# Patient Record
Sex: MALE | Race: OTHER | HISPANIC OR LATINO | URBAN - METROPOLITAN AREA
[De-identification: names, ages, dates, MRNs, and addresses within clinical notes are randomized per-mention and may not be internally consistent; named-entity substitution may affect disease eponyms.]

---

## 2023-08-29 ENCOUNTER — INPATIENT (INPATIENT)
Facility: HOSPITAL | Age: 48
LOS: 1 days | Discharge: HOME | DRG: 639 | End: 2023-08-31
Attending: GENERAL ACUTE CARE HOSPITAL | Admitting: INTERNAL MEDICINE
Payer: COMMERCIAL

## 2023-08-29 VITALS
SYSTOLIC BLOOD PRESSURE: 109 MMHG | DIASTOLIC BLOOD PRESSURE: 63 MMHG | WEIGHT: 130.07 LBS | HEART RATE: 110 BPM | OXYGEN SATURATION: 100 % | TEMPERATURE: 98 F | RESPIRATION RATE: 16 BRPM

## 2023-08-29 DIAGNOSIS — A15.9 RESPIRATORY TUBERCULOSIS UNSPECIFIED: ICD-10-CM

## 2023-08-29 DIAGNOSIS — R73.9 HYPERGLYCEMIA, UNSPECIFIED: ICD-10-CM

## 2023-08-29 DIAGNOSIS — Z29.9 ENCOUNTER FOR PROPHYLACTIC MEASURES, UNSPECIFIED: ICD-10-CM

## 2023-08-29 DIAGNOSIS — D50.9 IRON DEFICIENCY ANEMIA, UNSPECIFIED: ICD-10-CM

## 2023-08-29 DIAGNOSIS — E11.9 TYPE 2 DIABETES MELLITUS WITHOUT COMPLICATIONS: ICD-10-CM

## 2023-08-29 LAB
ALBUMIN SERPL ELPH-MCNC: 3.8 G/DL — SIGNIFICANT CHANGE UP (ref 3.3–5)
ALP SERPL-CCNC: 153 U/L — HIGH (ref 40–120)
ALT FLD-CCNC: 13 U/L — SIGNIFICANT CHANGE UP (ref 10–45)
ANION GAP SERPL CALC-SCNC: 11 MMOL/L — SIGNIFICANT CHANGE UP (ref 5–17)
ANISOCYTOSIS BLD QL: SIGNIFICANT CHANGE UP
APPEARANCE UR: CLEAR — SIGNIFICANT CHANGE UP
APTT BLD: 36.4 SEC — HIGH (ref 24.5–35.6)
AST SERPL-CCNC: 20 U/L — SIGNIFICANT CHANGE UP (ref 10–40)
B-OH-BUTYR SERPL-SCNC: 0.5 MMOL/L — HIGH
BASE EXCESS BLDV CALC-SCNC: -4.1 MMOL/L — LOW (ref -2–3)
BASOPHILS # BLD AUTO: 0.12 K/UL — SIGNIFICANT CHANGE UP (ref 0–0.2)
BASOPHILS NFR BLD AUTO: 2.8 % — HIGH (ref 0–2)
BILIRUB SERPL-MCNC: 0.6 MG/DL — SIGNIFICANT CHANGE UP (ref 0.2–1.2)
BILIRUB UR-MCNC: NEGATIVE — SIGNIFICANT CHANGE UP
BLD GP AB SCN SERPL QL: NEGATIVE — SIGNIFICANT CHANGE UP
BUN SERPL-MCNC: 14 MG/DL — SIGNIFICANT CHANGE UP (ref 7–23)
CA-I SERPL-SCNC: 1.3 MMOL/L — SIGNIFICANT CHANGE UP (ref 1.15–1.33)
CALCIUM SERPL-MCNC: 9.9 MG/DL — SIGNIFICANT CHANGE UP (ref 8.4–10.5)
CHLORIDE SERPL-SCNC: 100 MMOL/L — SIGNIFICANT CHANGE UP (ref 96–108)
CO2 BLDV-SCNC: 22.9 MMOL/L — SIGNIFICANT CHANGE UP (ref 22–26)
CO2 SERPL-SCNC: 21 MMOL/L — LOW (ref 22–31)
COLOR SPEC: YELLOW — SIGNIFICANT CHANGE UP
CREAT SERPL-MCNC: 0.61 MG/DL — SIGNIFICANT CHANGE UP (ref 0.5–1.3)
DIFF PNL FLD: NEGATIVE — SIGNIFICANT CHANGE UP
EGFR: 119 ML/MIN/1.73M2 — SIGNIFICANT CHANGE UP
EOSINOPHIL # BLD AUTO: 0.17 K/UL — SIGNIFICANT CHANGE UP (ref 0–0.5)
EOSINOPHIL NFR BLD AUTO: 3.8 % — SIGNIFICANT CHANGE UP (ref 0–6)
GAS PNL BLDV: 133 MMOL/L — LOW (ref 136–145)
GAS PNL BLDV: SIGNIFICANT CHANGE UP
GIANT PLATELETS BLD QL SMEAR: PRESENT — SIGNIFICANT CHANGE UP
GLUCOSE BLDC GLUCOMTR-MCNC: 313 MG/DL — HIGH (ref 70–99)
GLUCOSE BLDC GLUCOMTR-MCNC: 369 MG/DL — HIGH (ref 70–99)
GLUCOSE BLDC GLUCOMTR-MCNC: 484 MG/DL — CRITICAL HIGH (ref 70–99)
GLUCOSE SERPL-MCNC: 447 MG/DL — HIGH (ref 70–99)
GLUCOSE UR QL: >=1000
HCO3 BLDV-SCNC: 22 MMOL/L — SIGNIFICANT CHANGE UP (ref 22–29)
HCT VFR BLD CALC: 22.4 % — LOW (ref 39–50)
HCT VFR BLD CALC: 24.7 % — LOW (ref 39–50)
HGB BLD-MCNC: 6.5 G/DL — CRITICAL LOW (ref 13–17)
HGB BLD-MCNC: 7.2 G/DL — LOW (ref 13–17)
HIV 1+2 AB+HIV1 P24 AG SERPL QL IA: SIGNIFICANT CHANGE UP
HYPOCHROMIA BLD QL: SIGNIFICANT CHANGE UP
INR BLD: 1.13 — SIGNIFICANT CHANGE UP (ref 0.85–1.18)
KETONES UR-MCNC: NEGATIVE — SIGNIFICANT CHANGE UP
LACTATE SERPL-SCNC: 1.5 MMOL/L — SIGNIFICANT CHANGE UP (ref 0.5–2)
LEUKOCYTE ESTERASE UR-ACNC: NEGATIVE — SIGNIFICANT CHANGE UP
LIDOCAIN IGE QN: 144 U/L — HIGH (ref 7–60)
LYMPHOCYTES # BLD AUTO: 1.32 K/UL — SIGNIFICANT CHANGE UP (ref 1–3.3)
LYMPHOCYTES # BLD AUTO: 30.2 % — SIGNIFICANT CHANGE UP (ref 13–44)
MACROCYTES BLD QL: SLIGHT — SIGNIFICANT CHANGE UP
MAGNESIUM SERPL-MCNC: 2 MG/DL — SIGNIFICANT CHANGE UP (ref 1.6–2.6)
MANUAL SMEAR VERIFICATION: SIGNIFICANT CHANGE UP
MCHC RBC-ENTMCNC: 20.2 PG — LOW (ref 27–34)
MCHC RBC-ENTMCNC: 20.3 PG — LOW (ref 27–34)
MCHC RBC-ENTMCNC: 29 GM/DL — LOW (ref 32–36)
MCHC RBC-ENTMCNC: 29.1 GM/DL — LOW (ref 32–36)
MCV RBC AUTO: 69.2 FL — LOW (ref 80–100)
MCV RBC AUTO: 70 FL — LOW (ref 80–100)
METAMYELOCYTES # FLD: 0.9 % — HIGH (ref 0–0)
MICROCYTES BLD QL: SIGNIFICANT CHANGE UP
MONOCYTES # BLD AUTO: 0.25 K/UL — SIGNIFICANT CHANGE UP (ref 0–0.9)
MONOCYTES NFR BLD AUTO: 5.7 % — SIGNIFICANT CHANGE UP (ref 2–14)
NEUTROPHILS # BLD AUTO: 2.47 K/UL — SIGNIFICANT CHANGE UP (ref 1.8–7.4)
NEUTROPHILS NFR BLD AUTO: 56.6 % — SIGNIFICANT CHANGE UP (ref 43–77)
NITRITE UR-MCNC: NEGATIVE — SIGNIFICANT CHANGE UP
NRBC # BLD: 0 /100 WBCS — SIGNIFICANT CHANGE UP (ref 0–0)
OVALOCYTES BLD QL SMEAR: SLIGHT — SIGNIFICANT CHANGE UP
PCO2 BLDV: 41 MMHG — LOW (ref 42–55)
PH BLDV: 7.33 — SIGNIFICANT CHANGE UP (ref 7.32–7.43)
PH UR: 5.5 — SIGNIFICANT CHANGE UP (ref 5–8)
PLAT MORPH BLD: ABNORMAL
PLATELET # BLD AUTO: 296 K/UL — SIGNIFICANT CHANGE UP (ref 150–400)
PLATELET # BLD AUTO: 346 K/UL — SIGNIFICANT CHANGE UP (ref 150–400)
PO2 BLDV: <33 MMHG — SIGNIFICANT CHANGE UP (ref 25–45)
POIKILOCYTOSIS BLD QL AUTO: SLIGHT — SIGNIFICANT CHANGE UP
POLYCHROMASIA BLD QL SMEAR: SLIGHT — SIGNIFICANT CHANGE UP
POTASSIUM BLDV-SCNC: 4.7 MMOL/L — SIGNIFICANT CHANGE UP (ref 3.5–5.1)
POTASSIUM SERPL-MCNC: 4.4 MMOL/L — SIGNIFICANT CHANGE UP (ref 3.5–5.3)
POTASSIUM SERPL-SCNC: 4.4 MMOL/L — SIGNIFICANT CHANGE UP (ref 3.5–5.3)
PROT SERPL-MCNC: 8.6 G/DL — HIGH (ref 6–8.3)
PROT UR-MCNC: NEGATIVE MG/DL — SIGNIFICANT CHANGE UP
PROTHROM AB SERPL-ACNC: 12.8 SEC — SIGNIFICANT CHANGE UP (ref 9.5–13)
RAPID RVP RESULT: SIGNIFICANT CHANGE UP
RBC # BLD: 3.2 M/UL — LOW (ref 4.2–5.8)
RBC # BLD: 3.57 M/UL — LOW (ref 4.2–5.8)
RBC # FLD: 15.9 % — HIGH (ref 10.3–14.5)
RBC # FLD: 15.9 % — HIGH (ref 10.3–14.5)
RBC BLD AUTO: ABNORMAL
RH IG SCN BLD-IMP: POSITIVE — SIGNIFICANT CHANGE UP
SAO2 % BLDV: 50.8 % — LOW (ref 67–88)
SARS-COV-2 RNA SPEC QL NAA+PROBE: SIGNIFICANT CHANGE UP
SMUDGE CELLS # BLD: PRESENT — SIGNIFICANT CHANGE UP
SODIUM SERPL-SCNC: 132 MMOL/L — LOW (ref 135–145)
SP GR SPEC: 1.01 — SIGNIFICANT CHANGE UP (ref 1–1.03)
UROBILINOGEN FLD QL: 0.2 E.U./DL — SIGNIFICANT CHANGE UP
WBC # BLD: 3.79 K/UL — LOW (ref 3.8–10.5)
WBC # BLD: 4.36 K/UL — SIGNIFICANT CHANGE UP (ref 3.8–10.5)
WBC # FLD AUTO: 3.79 K/UL — LOW (ref 3.8–10.5)
WBC # FLD AUTO: 4.36 K/UL — SIGNIFICANT CHANGE UP (ref 3.8–10.5)

## 2023-08-29 PROCEDURE — 99222 1ST HOSP IP/OBS MODERATE 55: CPT | Mod: GC

## 2023-08-29 PROCEDURE — 71045 X-RAY EXAM CHEST 1 VIEW: CPT | Mod: 26

## 2023-08-29 PROCEDURE — 99223 1ST HOSP IP/OBS HIGH 75: CPT | Mod: GC

## 2023-08-29 PROCEDURE — 99285 EMERGENCY DEPT VISIT HI MDM: CPT

## 2023-08-29 RX ORDER — LANOLIN ALCOHOL/MO/W.PET/CERES
5 CREAM (GRAM) TOPICAL AT BEDTIME
Refills: 0 | Status: DISCONTINUED | OUTPATIENT
Start: 2023-08-29 | End: 2023-08-31

## 2023-08-29 RX ORDER — DEXTROSE 50 % IN WATER 50 %
15 SYRINGE (ML) INTRAVENOUS ONCE
Refills: 0 | Status: DISCONTINUED | OUTPATIENT
Start: 2023-08-29 | End: 2023-08-31

## 2023-08-29 RX ORDER — GLUCAGON INJECTION, SOLUTION 0.5 MG/.1ML
1 INJECTION, SOLUTION SUBCUTANEOUS ONCE
Refills: 0 | Status: DISCONTINUED | OUTPATIENT
Start: 2023-08-29 | End: 2023-08-31

## 2023-08-29 RX ORDER — HEXAVITAMINS
300 TABLET ORAL EVERY 24 HOURS
Refills: 0 | Status: DISCONTINUED | OUTPATIENT
Start: 2023-08-29 | End: 2023-08-31

## 2023-08-29 RX ORDER — INSULIN LISPRO 100/ML
VIAL (ML) SUBCUTANEOUS AT BEDTIME
Refills: 0 | Status: DISCONTINUED | OUTPATIENT
Start: 2023-08-29 | End: 2023-08-30

## 2023-08-29 RX ORDER — SODIUM CHLORIDE 9 MG/ML
1000 INJECTION, SOLUTION INTRAVENOUS
Refills: 0 | Status: DISCONTINUED | OUTPATIENT
Start: 2023-08-29 | End: 2023-08-31

## 2023-08-29 RX ORDER — DEXTROSE 50 % IN WATER 50 %
25 SYRINGE (ML) INTRAVENOUS ONCE
Refills: 0 | Status: DISCONTINUED | OUTPATIENT
Start: 2023-08-29 | End: 2023-08-31

## 2023-08-29 RX ORDER — PYRAZINAMIDE 500 MG/1
1500 TABLET ORAL EVERY 24 HOURS
Refills: 0 | Status: DISCONTINUED | OUTPATIENT
Start: 2023-08-29 | End: 2023-08-31

## 2023-08-29 RX ORDER — DEXTROSE 50 % IN WATER 50 %
12.5 SYRINGE (ML) INTRAVENOUS ONCE
Refills: 0 | Status: DISCONTINUED | OUTPATIENT
Start: 2023-08-29 | End: 2023-08-31

## 2023-08-29 RX ORDER — PYRIDOXINE HCL (VITAMIN B6) 100 MG
25 TABLET ORAL EVERY 24 HOURS
Refills: 0 | Status: DISCONTINUED | OUTPATIENT
Start: 2023-08-29 | End: 2023-08-30

## 2023-08-29 RX ORDER — INSULIN HUMAN 100 [IU]/ML
6 INJECTION, SOLUTION SUBCUTANEOUS ONCE
Refills: 0 | Status: COMPLETED | OUTPATIENT
Start: 2023-08-29 | End: 2023-08-29

## 2023-08-29 RX ORDER — INSULIN LISPRO 100/ML
VIAL (ML) SUBCUTANEOUS
Refills: 0 | Status: DISCONTINUED | OUTPATIENT
Start: 2023-08-29 | End: 2023-08-30

## 2023-08-29 RX ORDER — ISOPROPYL ALCOHOL, BENZOCAINE .7; .06 ML/ML; ML/ML
0 SWAB TOPICAL
Qty: 100 | Refills: 1
Start: 2023-08-29

## 2023-08-29 RX ORDER — INSULIN LISPRO 100/ML
5 VIAL (ML) SUBCUTANEOUS
Refills: 0 | Status: DISCONTINUED | OUTPATIENT
Start: 2023-08-29 | End: 2023-08-30

## 2023-08-29 RX ORDER — INSULIN LISPRO 100/ML
VIAL (ML) SUBCUTANEOUS
Refills: 0 | Status: DISCONTINUED | OUTPATIENT
Start: 2023-08-29 | End: 2023-08-29

## 2023-08-29 RX ORDER — ACETAMINOPHEN 500 MG
1000 TABLET ORAL EVERY 6 HOURS
Refills: 0 | Status: DISCONTINUED | OUTPATIENT
Start: 2023-08-29 | End: 2023-08-31

## 2023-08-29 RX ORDER — INSULIN GLARGINE 100 [IU]/ML
15 INJECTION, SOLUTION SUBCUTANEOUS AT BEDTIME
Refills: 0 | Status: DISCONTINUED | OUTPATIENT
Start: 2023-08-29 | End: 2023-08-31

## 2023-08-29 RX ORDER — SODIUM CHLORIDE 9 MG/ML
1000 INJECTION INTRAMUSCULAR; INTRAVENOUS; SUBCUTANEOUS ONCE
Refills: 0 | Status: COMPLETED | OUTPATIENT
Start: 2023-08-29 | End: 2023-08-29

## 2023-08-29 RX ORDER — ONDANSETRON 8 MG/1
4 TABLET, FILM COATED ORAL EVERY 8 HOURS
Refills: 0 | Status: DISCONTINUED | OUTPATIENT
Start: 2023-08-29 | End: 2023-08-31

## 2023-08-29 RX ADMIN — PYRAZINAMIDE 1500 MILLIGRAM(S): 500 TABLET ORAL at 18:01

## 2023-08-29 RX ADMIN — SODIUM CHLORIDE 1000 MILLILITER(S): 9 INJECTION INTRAMUSCULAR; INTRAVENOUS; SUBCUTANEOUS at 13:35

## 2023-08-29 RX ADMIN — Medication 5 UNIT(S): at 17:58

## 2023-08-29 RX ADMIN — Medication 25 MILLIGRAM(S): at 18:01

## 2023-08-29 RX ADMIN — SODIUM CHLORIDE 1000 MILLILITER(S): 9 INJECTION INTRAMUSCULAR; INTRAVENOUS; SUBCUTANEOUS at 12:12

## 2023-08-29 RX ADMIN — INSULIN HUMAN 6 UNIT(S): 100 INJECTION, SOLUTION SUBCUTANEOUS at 13:37

## 2023-08-29 RX ADMIN — Medication 300 MILLIGRAM(S): at 18:00

## 2023-08-29 RX ADMIN — Medication 4: at 17:59

## 2023-08-29 RX ADMIN — INSULIN GLARGINE 15 UNIT(S): 100 INJECTION, SOLUTION SUBCUTANEOUS at 22:47

## 2023-08-29 NOTE — DISCHARGE NOTE PROVIDER - NSDCFUADDAPPT_GEN_ALL_CORE_FT
Please ensure you have a follow-up with a PCP. We have made an appointment for you with Dr. Micky Simon on 9/11 at [facility]. It is essential you get both a GI (gastroenterology) appointment to discuss your anemia and family history of colon cancer. You should also have make an appointment with an endocrinologist, otherwise your PCP can help manage your diabetes.  Please ensure you have a follow-up with a PCP. We have made an appointment for you with Dr. Micky Simon on 9/11 11:45 am at [facility]. It is essential you get both a GI (gastroenterology) appointment to discuss your anemia and family history of colon cancer. You should also have make an appointment with an endocrinologist, otherwise your PCP can help manage your diabetes.  Please ensure you have a follow-up with a PCP. We have made an appointment for you with Dr. Micky Simon on 9/11 11:45 am at [facility]. It is essential you get both a GI (gastroenterology) appointment to discuss your anemia and family history of colon cancer. You should also have make an appointment with an endocrinologist, otherwise your PCP can help manage your diabetes.     It is also important you continue to follow-up with the [facility] [place] for continued management of your tuberculosis.  Address: [address] [place] [address]  Phone: [phone] Please ensure you have a follow-up with a PCP. We have made an appointment for you with Dr. Micky Simon on 9/11 11:45 am at [facility]. It is essential you get both a GI (gastroenterology) appointment to discuss your anemia and family history of colon cancer. You should also have make an appointment with an endocrinologist, otherwise your PCP can help manage your diabetes.     It is also important you continue to follow-up with the [facility] [place] for continued management of your tuberculosis.  Address: [address] [place] [address]  Phone: [phone]  You have an appointment on 9/5/23 at 10:30 am.

## 2023-08-29 NOTE — CONSULT NOTE ADULT - ATTENDING COMMENTS
Pt seen on rounds this afternoon.  48-yo man with a history of EtOH abuse (says he stopped 2 months ago), Pulmonary TB (diagnosed 2 months ago and on triple-drug therapy) and type 2 DM (diagnosed 1 week ago).  Is now admitted for uncontrolled hyperglycemia.  Had been given a prescription to start metformin for the DM, but did not start it.    glucose 447 on presentation with A1c 11.3%.  Non-ketotic. Glucose has decreased into the 300 range with a single correction dose of regular insulin.  Diet reviewed--no obvious intake of excess fruit, juice, concentrated sweets, etc--but possibly excessive portions of rice at his main meals.  --Will start on Lantus 15 units hs/premeal lispro 5 units  --Will need diabetic teaching  --given his lack of obesity and negative family history for DM need to consider the possibility that he is an incipient Type 1

## 2023-08-29 NOTE — ED ADULT NURSE NOTE - OBJECTIVE STATEMENT
Patient w/ Hx of TB, Dx and treatment started June, c/o of cough w/ phlegm, no hematemesis, mild SOB and w/ fatigue. As per Dr. Cummings/ [facility], patient no longer infectious, recent sputum culture negative,  last July 13 + sputum culture , negative since.   Patient also recently diagnosed w/ diabetes, c/o of frequent urination and thirst, not yet started on medications.  FSBG in .  No chest pain, no lighheadednss /dizziness.   Patient Portuguese speaking only; translation phone uses.  Isolation droplet observed.  pMHx  ETOH, TB, Diabetes

## 2023-08-29 NOTE — CONSULT NOTE ADULT - SUBJECTIVE AND OBJECTIVE BOX
HISTORY OF PRESENT ILLNESS  RONDA EDMONDSON is a 48y Male with a past medical history of etoh use disorder, recent diagnosis of pulm TB (on INH/Rifamin/PZA started on 6/29) was sent to hospital     On admission, hgb 7.2, glucose 447, Na 132, bicarb 21, Cr 0.61, beta-hydroxybutyrase 0.5, a1c 11.3, lipase 144    Endocrinology has been consulted for Diabetes Management.    DIABETES HISTORY  - Age at diagnosis:   - Symptoms at time of diagnosis:   - Current Therapy:  - History of other regimens:   - History of hypoglycemia:   - History of DKA/HHS:   - Diabetic Complications:   - Home FSG:        > Fasting: *** mg/dL.        > Before meals: *** mg/dL.        > Bedtime: *** mg/dL.  - Diet:          > Breakfast:         > Lunch:        > Dinner:        > Snacks:  - Physical activity:    - Diabetes managed outpatient by:    FAMILY HISTORY  - Diabetes:  - Thyroid:  - Autoimmune:  - Other:    SOCIAL HISTORY  - Work:  - Alcohol:  - Smoking:  - Recreational Drugs:    ALLERGIES  No Known Allergies      CURRENT MEDICATIONS  acetaminophen     Tablet .. 1000 milliGRAM(s) Oral every 6 hours PRN  aluminum hydroxide/magnesium hydroxide/simethicone Suspension 30 milliLiter(s) Oral every 4 hours PRN  dextrose 5%. 1000 milliLiter(s) IV Continuous <Continuous>  dextrose 5%. 1000 milliLiter(s) IV Continuous <Continuous>  dextrose 50% Injectable 25 Gram(s) IV Push once  dextrose 50% Injectable 12.5 Gram(s) IV Push once  dextrose 50% Injectable 25 Gram(s) IV Push once  dextrose Oral Gel 15 Gram(s) Oral once PRN  glucagon  Injectable 1 milliGRAM(s) IntraMuscular once  insulin lispro (ADMELOG) corrective regimen sliding scale   SubCutaneous three times a day before meals  isoniazid 300 milliGRAM(s) Oral every 24 hours  melatonin 5 milliGRAM(s) Oral at bedtime PRN  ondansetron Injectable 4 milliGRAM(s) IV Push every 8 hours PRN  pyrazinamide 1500 milliGRAM(s) Oral every 24 hours  pyridoxine 25 milliGRAM(s) Oral every 24 hours  rifAMPin 600 milliGRAM(s) Oral every 24 hours      REVIEW OF SYSTEMS  Constitutional:  Negative fever, chills or loss of appetite.  Eyes:  Negative blurry vision or double vision.  Cardiovascular:  Negative for chest pain or palpitations.  Respiratory:  Negative for cough, wheezing, or shortness of breath.   Gastrointestinal:  Negative for nausea, vomiting, diarrhea, constipation, or abdominal pain.  Genitourinary:  Negative frequency, urgency or dysuria.  Neurologic:  No headache, confusion, dizziness, lightheadedness.    PHYSICAL EXAM  Vital Signs Last 24 Hrs  T(C): 37.1 (29 Aug 2023 12:18), Max: 37.1 (29 Aug 2023 12:18)  T(F): 98.7 (29 Aug 2023 12:18), Max: 98.7 (29 Aug 2023 12:18)  HR: 106 (29 Aug 2023 13:46) (106 - 111)  BP: 115/70 (29 Aug 2023 13:46) (109/63 - 124/64)  BP(mean): --  RR: 18 (29 Aug 2023 13:46) (16 - 18)  SpO2: 98% (29 Aug 2023 13:46) (98% - 100%)    Parameters below as of 29 Aug 2023 13:46  Patient On (Oxygen Delivery Method): room air    Constitutional: Awake, alert, in no acute distress.   HEENT: Normocephalic, atraumatic, CHIKIS, no proptosis or lid retraction.   Neck: supple, no acanthosis, no thyromegaly or palpable thyroid nodules.  Respiratory: Lungs clear to ausculation bilaterally.   Cardiovascular: regular rhythm, normal S1 and S2, no audible murmurs.   GI: soft, non-tender, non-distended, bowel sounds present, no masses appreciated.  Extremities: No lower extremity edema, peripheral pulses present.   Skin: no rashes.   Psychiatric: AAO x 3. Normal affect/mood.     LABS  CBC - WBC/HGB/HTC/PLT: 4.36/7.2/24.7/346 (08-29-23)  BMP: Na/K/Cl/Bicarb/BUN/Cr/Gluc: 132/4.4/100/21/14/0.61/447 (08-29-23)  Anion Gap: 11 (08-29-23)  eGFR: 119 (08-29-23)  Calcium: 9.9 (08-29-23)  Phosphorus: -- (08-29-23)  Magnesium: 2.0 (08-29-23)  LFT - Alb/Tprot/Tbili/Dbili/AlkPhos/ALT/AST: 3.8/--/0.6/--/153/13/20 (08-29-23)  PT/aPTT/INR: 12.8/36.4/1.13 (08-29-23)    CBC - WBC/HGB/HTC/PLT: 4.36/7.2/24.7/346 (08-29-23)BMP: Na/K/Cl/Bicarb/BUN/Cr/Gluc: 132/4.4/100/21/14/0.61/447 (08-29-23)  Anion Gap: 11 (08-29-23)  eGFR: 119 (08-29-23)  Calcium: 9.9 (08-29-23)  Phosphorus: -- (08-29-23)  Magnesium: 2.0 (08-29-23)    CAPILLARY BLOOD GLUCOSE & INSULIN RECEIVED  426 mg/dL (08-29 @ 11:14)  446 mg/dL (08-29 @ 11:35)  372 mg/dL (08-29 @ 13:32)  301 mg/dL (08-29 @ 14:19)          ASSESSMENT / RECOMMENDATIONS    A1C: 11.3 %  BUN: 14  Creatinine: 0.61  GFR: 119  Weight: 59  BMI:   EF:     # Type 2 diabetes mellitus with hyperglycemia  - Please continue lantus *** units at bedtime.   - Continue lispro *** units before each meal.  - Continue lispro *** dose sliding scale before meals and at bedtime.  - Patient's fingerstick glucose goal is 100-180 mg/dL.    - Discharge recommendations to be discussed.   - Patient can follow up at discharge with [facility] by calling [phone] to make an appointment.      Case discussed with Dr. Sun. Primary team updated.       Jordana Bell  Endocrinology Fellow    Service Pager: [phone]  HISTORY OF PRESENT ILLNESS  RONDA EDMONDSON is a 48y Male with a past medical history of etoh use disorder, recent diagnosis of pulm TB (on INH/Rifamin/PZA started on 6/29) was sent to hospital by his PMD for glucose > 600.  Pt endorses polydipsia and polyuria and recently diagnosed with DM outpatient less than one week ago.  Pt was supposedly prescribed metformin however pt has not started taking.  Pt has also endorses leg swelling and abdomin pain that has no association to food.  Denies headaches, vomiting, nausea, changes in urination and bowelmovement.    On admission, hgb 7.2, glucose 447, Na 132, bicarb 21, Cr 0.61, beta-hydroxybutyrase 0.5, a1c 11.3, lipase 144.  Endocrinology has been consulted for Diabetes Management.     [id number] was used partially  DIABETES HISTORY  - Age at diagnosis: less than one week ago  - Symptoms at time of diagnosis: polydipsia, polyuria  - Current Therapy: metformin however pt has not started taking  - Diet:          > Breakfast:  coffee and banana        > Lunch: chicken and rice        > Dinner: chicken and rce  - Diabetes managed outpatient by: PCP    FAMILY HISTORY  - Diabetes: denies  - Thyroid: denies    SOCIAL HISTORY  - Alcohol:  - Smoking: denies  - Recreational Drugs: denies    ALLERGIES  No Known Allergies      CURRENT MEDICATIONS  acetaminophen     Tablet .. 1000 milliGRAM(s) Oral every 6 hours PRN  aluminum hydroxide/magnesium hydroxide/simethicone Suspension 30 milliLiter(s) Oral every 4 hours PRN  dextrose 5%. 1000 milliLiter(s) IV Continuous <Continuous>  dextrose 5%. 1000 milliLiter(s) IV Continuous <Continuous>  dextrose 50% Injectable 25 Gram(s) IV Push once  dextrose 50% Injectable 12.5 Gram(s) IV Push once  dextrose 50% Injectable 25 Gram(s) IV Push once  dextrose Oral Gel 15 Gram(s) Oral once PRN  glucagon  Injectable 1 milliGRAM(s) IntraMuscular once  insulin lispro (ADMELOG) corrective regimen sliding scale   SubCutaneous three times a day before meals  isoniazid 300 milliGRAM(s) Oral every 24 hours  melatonin 5 milliGRAM(s) Oral at bedtime PRN  ondansetron Injectable 4 milliGRAM(s) IV Push every 8 hours PRN  pyrazinamide 1500 milliGRAM(s) Oral every 24 hours  pyridoxine 25 milliGRAM(s) Oral every 24 hours  rifAMPin 600 milliGRAM(s) Oral every 24 hours      REVIEW OF SYSTEMS  Constitutional:  Negative fever, chills or loss of appetite.  Eyes:  Negative blurry vision or double vision.  Cardiovascular:  Negative for chest pain or palpitations.  Respiratory:  Negative for cough, wheezing, or shortness of breath.   Gastrointestinal:  abdominal pain  Genitourinary:  Negative frequency, urgency or dysuria.  Neurologic:  No headache, confusion, dizziness, lightheadedness.    PHYSICAL EXAM  Vital Signs Last 24 Hrs  T(C): 37.1 (29 Aug 2023 12:18), Max: 37.1 (29 Aug 2023 12:18)  T(F): 98.7 (29 Aug 2023 12:18), Max: 98.7 (29 Aug 2023 12:18)  HR: 106 (29 Aug 2023 13:46) (106 - 111)  BP: 115/70 (29 Aug 2023 13:46) (109/63 - 124/64)  BP(mean): --  RR: 18 (29 Aug 2023 13:46) (16 - 18)  SpO2: 98% (29 Aug 2023 13:46) (98% - 100%)    Parameters below as of 29 Aug 2023 13:46  Patient On (Oxygen Delivery Method): room air    Constitutional: Awake, alert, in no acute distress.   HEENT: Normocephalic, atraumatic, CHIKIS, no proptosis or lid retraction.   Neck: supple, no acanthosis, no thyromegaly or palpable thyroid nodules.  Respiratory: Lungs clear to ausculation bilaterally.   Cardiovascular: regular rhythm, normal S1 and S2, no audible murmurs.   GI: soft, non-tender, non-distended, bowel sounds present, no masses appreciated.  Extremities: No lower extremity edema, peripheral pulses present.   Skin: no rashes.   Psychiatric: AAO x 3. Normal affect/mood.     LABS  CBC - WBC/HGB/HTC/PLT: 4.36/7.2/24.7/346 (08-29-23)  BMP: Na/K/Cl/Bicarb/BUN/Cr/Gluc: 132/4.4/100/21/14/0.61/447 (08-29-23)  Anion Gap: 11 (08-29-23)  eGFR: 119 (08-29-23)  Calcium: 9.9 (08-29-23)  Phosphorus: -- (08-29-23)  Magnesium: 2.0 (08-29-23)  LFT - Alb/Tprot/Tbili/Dbili/AlkPhos/ALT/AST: 3.8/--/0.6/--/153/13/20 (08-29-23)  PT/aPTT/INR: 12.8/36.4/1.13 (08-29-23)    CBC - WBC/HGB/HTC/PLT: 4.36/7.2/24.7/346 (08-29-23)BMP: Na/K/Cl/Bicarb/BUN/Cr/Gluc: 132/4.4/100/21/14/0.61/447 (08-29-23)  Anion Gap: 11 (08-29-23)  eGFR: 119 (08-29-23)  Calcium: 9.9 (08-29-23)  Phosphorus: -- (08-29-23)  Magnesium: 2.0 (08-29-23)    CAPILLARY BLOOD GLUCOSE & INSULIN RECEIVED  426 mg/dL (08-29 @ 11:14)  446 mg/dL (08-29 @ 11:35)  372 mg/dL (08-29 @ 13:32)  301 mg/dL (08-29 @ 14:19)          ASSESSMENT / RECOMMENDATIONS    A1C: 11.3 %  BUN: 14  Creatinine: 0.61  GFR: 119  Weight: 59  BMI:   EF:     # Type 2 diabetes mellitus with hyperglycemia  - Please continue lantus *** units at bedtime.   - Continue lispro *** units before each meal.  - Continue lispro *** dose sliding scale before meals and at bedtime.  - Patient's fingerstick glucose goal is 100-180 mg/dL.    - Discharge recommendations to be discussed.   - Patient can follow up at discharge with [facility] by calling [phone] to make an appointment.      Case discussed with Dr. Sun. Primary team updated.       Jordana Bell  Endocrinology Fellow    Service Pager: [phone]  HISTORY OF PRESENT ILLNESS  RONDA EDMONDSON is a 48y Male with a past medical history of etoh use disorder, recent diagnosis of pulm TB (on INH/Rifamin/PZA started on 6/29) was sent to hospital by his PMD for glucose > 600.  Pt endorses polydipsia and polyuria and recently diagnosed with DM outpatient less than one week ago.  Pt was supposedly prescribed metformin however pt has not started taking.  Pt has also endorses leg swelling and abdomin pain that has no association to food.  Denies headaches, vomiting, nausea, changes in urination and bowel movement.    On admission, hgb 7.2, glucose 447, Na 132, bicarb 21, Cr 0.61, beta-hydroxybutyrase 0.5, a1c 11.3, lipase 144.  Endocrinology has been consulted for Diabetes Management.     [id number] was used partially  DIABETES HISTORY  - Age at diagnosis: less than one week ago  - Symptoms at time of diagnosis: polydipsia, polyuria  - Current Therapy: metformin however pt has not started taking  - Diet:          > Breakfast:  coffee and banana        > Lunch: chicken and rice        > Dinner: chicken and rce  - Diabetes managed outpatient by: PCP    FAMILY HISTORY  - Diabetes: denies  - Thyroid: denies    SOCIAL HISTORY  - Alcohol: 7 beers daily, 2 months ago  - Smoking: denies  - Recreational Drugs: denies    ALLERGIES  No Known Allergies      CURRENT MEDICATIONS  acetaminophen     Tablet .. 1000 milliGRAM(s) Oral every 6 hours PRN  aluminum hydroxide/magnesium hydroxide/simethicone Suspension 30 milliLiter(s) Oral every 4 hours PRN  dextrose 5%. 1000 milliLiter(s) IV Continuous <Continuous>  dextrose 5%. 1000 milliLiter(s) IV Continuous <Continuous>  dextrose 50% Injectable 25 Gram(s) IV Push once  dextrose 50% Injectable 12.5 Gram(s) IV Push once  dextrose 50% Injectable 25 Gram(s) IV Push once  dextrose Oral Gel 15 Gram(s) Oral once PRN  glucagon  Injectable 1 milliGRAM(s) IntraMuscular once  insulin lispro (ADMELOG) corrective regimen sliding scale   SubCutaneous three times a day before meals  isoniazid 300 milliGRAM(s) Oral every 24 hours  melatonin 5 milliGRAM(s) Oral at bedtime PRN  ondansetron Injectable 4 milliGRAM(s) IV Push every 8 hours PRN  pyrazinamide 1500 milliGRAM(s) Oral every 24 hours  pyridoxine 25 milliGRAM(s) Oral every 24 hours  rifAMPin 600 milliGRAM(s) Oral every 24 hours      REVIEW OF SYSTEMS  Constitutional:  Negative fever, chills or loss of appetite.  Eyes:  Negative blurry vision or double vision.  Cardiovascular:  Negative for chest pain or palpitations.  Respiratory:  Negative for cough, wheezing, or shortness of breath.   Gastrointestinal:  abdominal pain  Genitourinary:  Negative frequency, urgency or dysuria.  Neurologic:  No headache, confusion, dizziness, lightheadedness.    PHYSICAL EXAM  Vital Signs Last 24 Hrs  T(C): 37.1 (29 Aug 2023 12:18), Max: 37.1 (29 Aug 2023 12:18)  T(F): 98.7 (29 Aug 2023 12:18), Max: 98.7 (29 Aug 2023 12:18)  HR: 106 (29 Aug 2023 13:46) (106 - 111)  BP: 115/70 (29 Aug 2023 13:46) (109/63 - 124/64)  BP(mean): --  RR: 18 (29 Aug 2023 13:46) (16 - 18)  SpO2: 98% (29 Aug 2023 13:46) (98% - 100%)    Parameters below as of 29 Aug 2023 13:46  Patient On (Oxygen Delivery Method): room air    Constitutional: Awake, alert, in no acute distress.   HEENT: Normocephalic, atraumatic, CHIKIS, no proptosis or lid retraction.   Neck: supple, no acanthosis, no thyromegaly or palpable thyroid nodules.  Respiratory: Lungs clear to ausculation bilaterally.   Cardiovascular: regular rhythm, normal S1 and S2, no audible murmurs.   GI: soft, non-tender, non-distended, bowel sounds present, no masses appreciated.  Extremities: No lower extremity edema, peripheral pulses present.   Skin: no rashes.   Psychiatric: AAO x 3. Normal affect/mood.     LABS  CBC - WBC/HGB/HTC/PLT: 4.36/7.2/24.7/346 (08-29-23)  BMP: Na/K/Cl/Bicarb/BUN/Cr/Gluc: 132/4.4/100/21/14/0.61/447 (08-29-23)  Anion Gap: 11 (08-29-23)  eGFR: 119 (08-29-23)  Calcium: 9.9 (08-29-23)  Phosphorus: -- (08-29-23)  Magnesium: 2.0 (08-29-23)  LFT - Alb/Tprot/Tbili/Dbili/AlkPhos/ALT/AST: 3.8/--/0.6/--/153/13/20 (08-29-23)  PT/aPTT/INR: 12.8/36.4/1.13 (08-29-23)    CBC - WBC/HGB/HTC/PLT: 4.36/7.2/24.7/346 (08-29-23)BMP: Na/K/Cl/Bicarb/BUN/Cr/Gluc: 132/4.4/100/21/14/0.61/447 (08-29-23)  Anion Gap: 11 (08-29-23)  eGFR: 119 (08-29-23)  Calcium: 9.9 (08-29-23)  Phosphorus: -- (08-29-23)  Magnesium: 2.0 (08-29-23)    CAPILLARY BLOOD GLUCOSE & INSULIN RECEIVED  426 mg/dL (08-29 @ 11:14)  446 mg/dL (08-29 @ 11:35)  372 mg/dL (08-29 @ 13:32)  301 mg/dL (08-29 @ 14:19)          ASSESSMENT / RECOMMENDATIONS    48y Male with a past medical history of etoh use disorder, recent diagnosis of pulm TB (on INH/Rifamin/PZA started on 6/29) was sent to hospital by his PMD for glucose > 600.  Pt endorses polydipsia and polyuria and recently diagnosed with DM outpatient less than one week ago.  Endocrinology has been consulted for Diabetes Management.    A1C: 11.3 %  BUN: 14  Creatinine: 0.61  GFR: 119  Weight: 59  BMI:   EF:     # Type 2 diabetes mellitus with hyperglycemia  - Please keep lantus  units at bedtime.   - keep  lispro  units before each meal.  - Continue lispro moderate dose sliding scale before meals and at bedtime.  - Patient's fingerstick glucose goal is 100-180 mg/dL.    - Discharge recommendations to be discussed.   - Patient can follow up at discharge with [facility] by calling [phone] to make an appointment.      Case discussed with Dr. Sun. Primary team updated.       Jordana Bell  Endocrinology Fellow    Service Pager: [phone]  HISTORY OF PRESENT ILLNESS  RONDA EDMONDSON is a 48y Male with a past medical history of etoh use disorder, recent diagnosis of pulm TB (on INH/Rifamin/PZA started on 6/29) was sent to hospital by his PMD for glucose > 600.  Pt endorses polydipsia and polyuria and recently diagnosed with DM outpatient less than one week ago.  Pt was supposedly prescribed metformin however pt has not started taking.  Pt has also endorses leg swelling and abdomin pain that has no association to food.  Denies headaches, vomiting, nausea, changes in urination and bowel movement.    On admission, hgb 7.2, glucose 447, Na 132, bicarb 21, Cr 0.61, beta-hydroxybutyrase 0.5, a1c 11.3, lipase 144.  Endocrinology has been consulted for Diabetes Management.     [id number] was used partially  DIABETES HISTORY  - Age at diagnosis: less than one week ago  - Symptoms at time of diagnosis: polydipsia, polyuria  - Current Therapy: metformin however pt has not started taking  - Diet:          > Breakfast:  coffee and banana        > Lunch: chicken and rice        > Dinner: chicken and rce  - Diabetes managed outpatient by: PCP    FAMILY HISTORY  - Diabetes: denies  - Thyroid: denies    SOCIAL HISTORY  - Alcohol: 7 beers daily, 2 months ago  - Smoking: denies  - Recreational Drugs: denies    ALLERGIES  No Known Allergies      CURRENT MEDICATIONS  acetaminophen     Tablet .. 1000 milliGRAM(s) Oral every 6 hours PRN  aluminum hydroxide/magnesium hydroxide/simethicone Suspension 30 milliLiter(s) Oral every 4 hours PRN  dextrose 5%. 1000 milliLiter(s) IV Continuous <Continuous>  dextrose 5%. 1000 milliLiter(s) IV Continuous <Continuous>  dextrose 50% Injectable 25 Gram(s) IV Push once  dextrose 50% Injectable 12.5 Gram(s) IV Push once  dextrose 50% Injectable 25 Gram(s) IV Push once  dextrose Oral Gel 15 Gram(s) Oral once PRN  glucagon  Injectable 1 milliGRAM(s) IntraMuscular once  insulin lispro (ADMELOG) corrective regimen sliding scale   SubCutaneous three times a day before meals  isoniazid 300 milliGRAM(s) Oral every 24 hours  melatonin 5 milliGRAM(s) Oral at bedtime PRN  ondansetron Injectable 4 milliGRAM(s) IV Push every 8 hours PRN  pyrazinamide 1500 milliGRAM(s) Oral every 24 hours  pyridoxine 25 milliGRAM(s) Oral every 24 hours  rifAMPin 600 milliGRAM(s) Oral every 24 hours      REVIEW OF SYSTEMS  Constitutional:  Negative fever, chills or loss of appetite.  Eyes:  Negative blurry vision or double vision.  Cardiovascular:  Negative for chest pain or palpitations.  Respiratory:  Negative for cough, wheezing, or shortness of breath.   Gastrointestinal:  abdominal pain  Genitourinary:  Negative frequency, urgency or dysuria.  Neurologic:  No headache, confusion, dizziness, lightheadedness.    PHYSICAL EXAM  Vital Signs Last 24 Hrs  T(C): 37.1 (29 Aug 2023 12:18), Max: 37.1 (29 Aug 2023 12:18)  T(F): 98.7 (29 Aug 2023 12:18), Max: 98.7 (29 Aug 2023 12:18)  HR: 106 (29 Aug 2023 13:46) (106 - 111)  BP: 115/70 (29 Aug 2023 13:46) (109/63 - 124/64)  BP(mean): --  RR: 18 (29 Aug 2023 13:46) (16 - 18)  SpO2: 98% (29 Aug 2023 13:46) (98% - 100%)    Parameters below as of 29 Aug 2023 13:46  Patient On (Oxygen Delivery Method): room air    Constitutional: Awake, alert, in no acute distress.   HEENT: Normocephalic, atraumatic, CHIKIS, no proptosis or lid retraction.   Neck: supple, no acanthosis, no thyromegaly or palpable thyroid nodules.  Respiratory: Lungs clear to ausculation bilaterally.   Cardiovascular: regular rhythm, normal S1 and S2, no audible murmurs.   GI: soft, non-tender, non-distended, bowel sounds present, no masses appreciated.  Extremities: No lower extremity edema, peripheral pulses present.   Skin: no rashes.   Psychiatric: AAO x 3. Normal affect/mood.     LABS  CBC - WBC/HGB/HTC/PLT: 4.36/7.2/24.7/346 (08-29-23)  BMP: Na/K/Cl/Bicarb/BUN/Cr/Gluc: 132/4.4/100/21/14/0.61/447 (08-29-23)  Anion Gap: 11 (08-29-23)  eGFR: 119 (08-29-23)  Calcium: 9.9 (08-29-23)  Phosphorus: -- (08-29-23)  Magnesium: 2.0 (08-29-23)  LFT - Alb/Tprot/Tbili/Dbili/AlkPhos/ALT/AST: 3.8/--/0.6/--/153/13/20 (08-29-23)  PT/aPTT/INR: 12.8/36.4/1.13 (08-29-23)    CBC - WBC/HGB/HTC/PLT: 4.36/7.2/24.7/346 (08-29-23)BMP: Na/K/Cl/Bicarb/BUN/Cr/Gluc: 132/4.4/100/21/14/0.61/447 (08-29-23)  Anion Gap: 11 (08-29-23)  eGFR: 119 (08-29-23)  Calcium: 9.9 (08-29-23)  Phosphorus: -- (08-29-23)  Magnesium: 2.0 (08-29-23)    CAPILLARY BLOOD GLUCOSE & INSULIN RECEIVED  426 mg/dL (08-29 @ 11:14)  446 mg/dL (08-29 @ 11:35)  372 mg/dL (08-29 @ 13:32)  301 mg/dL (08-29 @ 14:19)          ASSESSMENT / RECOMMENDATIONS    48y Male with a past medical history of etoh use disorder, recent diagnosis of pulm TB (on INH/Rifamin/PZA started on 6/29) and recent diagnosis of DM, was sent to hospital by his PMD for glucose > 600.  Pt endorses polydipsia and polyuria and recently diagnosed with DM outpatient less than one week ago.  Endocrinology has been consulted for Diabetes Management.    A1C: 11.3 %  BUN: 14  Creatinine: 0.61  GFR: 119  Weight: 59  BMI:   EF:     # Type 2 diabetes mellitus with hyperglycemia  - Please keep lantus 15  units at bedtime.   - keep  lispro 5 units before each meal.  - Continue lispro low  dose sliding scale before meals and at bedtime.  - please check C-peptide tomorrow AM  - please check insulin antibodies tomorrow AM as well.  - Patient's fingerstick glucose goal is 100-180 mg/dL.    - Discharge recommendations to be discussed.   - Patient can follow up at discharge with [facility] by calling [phone] to make an appointment.      Case discussed with Dr. Sun. Primary team updated.       Jordana Bell  Endocrinology Fellow    Service Pager: [phone]

## 2023-08-29 NOTE — DISCHARGE NOTE PROVIDER - ATTENDING DISCHARGE PHYSICAL EXAMINATION:
HEENT: PERRL, anicteric sclera; MMM  Neck: supple  Cardiovascular: +S1/S2, RRR  Respiratory: CTA B/L; normal wob  Gastrointestinal: soft; tenderness to palpation noted in LLQ/ND; +BSx4  Extremities: WWP; no edema, clubbing or cyanosis  Vascular: 2+ radial, DP/PT pulses B/L  Neurological: AAOx3; no focal deficits  Psychiatric: pleasant mood and affect  Dermatologic: no appreciable wounds or damage to the skin

## 2023-08-29 NOTE — DISCHARGE NOTE PROVIDER - NSDCCPTREATMENT_GEN_ALL_CORE_FT
PRINCIPAL PROCEDURE  Procedure: XR chest AP  Findings and Treatment: ACC: [id number] EXAM:  XR CHEST PORTABLE URGENT 1V   ORDERED BY: BREANA MORA   PROCEDURE DATE:  08/29/2023    INTERPRETATION:  PORTABLE CHEST X-RAY  Indication: known TB  No prior study available for comparison.  FINDINGS: Large consolidative opacity with possible cavitary component   left midlung. Additional areas of consolidation right upper lobe and both   lower lobes. No pleural effusion. Heart size within normal limits.   Widened mediastinum, consistent with lymphadenopathy.  IMPRESSION: Consolidation bilaterally with possible cavity left midlung.   Lymphadenopathy. Findings consistent with history of tuberculosis.  --- End of Report ---

## 2023-08-29 NOTE — DISCHARGE NOTE PROVIDER - NSDCQMSTAIRS_GEN_ALL_CORE
Atrial fibrillation and flutter    Diverticulitis of Colon  with Hx of colostomy bag  Emphysema    HTN (Hypertension)    Inguinal hernia    Prostate Cancer  with seeds implant    Ventral hernia No

## 2023-08-29 NOTE — DISCHARGE NOTE PROVIDER - NSDCMRMEDTOKEN_GEN_ALL_CORE_FT
isoniazid 300 mg oral tablet: 1 tab(s) orally once a day  metFORMIN 1000 mg oral tablet: 1 tab(s) orally once a day  pyrazinamide 500 mg oral tablet: 3 tab(s) orally once a day  rifAMPin 300 mg oral capsule: 2 cap(s) orally once a day  Vitamin B6 25 mg oral tablet: 1 tab(s) orally once a day   alcohol swabs: Apply topically to affected area 4 times a day  glucometer (per patient&#x27;s insurance): Test blood sugars four times a day. Dispense #1 glucometer.  isoniazid 300 mg oral tablet: 1 tab(s) orally once a day  lancets: 1 application subcutaneously 4 times a day  metFORMIN 1000 mg oral tablet: 1 tab(s) orally once a day  pyrazinamide 500 mg oral tablet: 3 tab(s) orally once a day  rifAMPin 300 mg oral capsule: 2 cap(s) orally once a day  test strips (per patient&#x27;s insurance): 1 application subcutaneously 4 times a day. ** Compatible with patient&#x27;s glucometer **  Vitamin B6 25 mg oral tablet: 1 tab(s) orally once a day   alcohol swabs: Apply topically to affected area 4 times a day  FeroSul 325 mg (65 mg elemental iron) oral tablet: 1 tab(s) orally once a day  glucometer (per patient&#x27;s insurance): Test blood sugars four times a day. Dispense #1 glucometer.  isoniazid 300 mg oral tablet: 1 tab(s) orally once a day  lancets: 1 application subcutaneously 4 times a day  metFORMIN 1000 mg oral tablet: 1 tab(s) orally once a day  pyrazinamide 500 mg oral tablet: 3 tab(s) orally once a day  pyridoxine 50 mg oral tablet: 1 tab(s) orally once a day  rifAMPin 300 mg oral capsule: 2 cap(s) orally once a day  test strips (per patient&#x27;s insurance): 1 application subcutaneously 4 times a day. ** Compatible with patient&#x27;s glucometer **   alcohol swabs: Apply topically to affected area 4 times a day  FeroSul 325 mg (65 mg elemental iron) oral tablet: 1 tab(s) orally once a day  glucometer (per patient&#x27;s insurance): Test blood sugars four times a day. Dispense #1 glucometer.  insulin glargine 100 units/mL subcutaneous solution: 12 solution subcutaneous once a day (at bedtime) Please administer 12 units once a day before going to bed.  Insulin Glargine Solostar Pen 100 units/mL subcutaneous solution: 12 unit(s) subcutaneous once a day (at bedtime)  insulin lispro 100 units/mL injectable solution: 12 unit(s) injectable 3 times a day (before meals) Please administer 12 units before meals, three times a day. Do not administer if skipping meal.  Insulin Lispro KwikPen 100 units/mL injectable solution: 12 unit(s) injectable 3 times a day (before meals)  isoniazid 300 mg oral tablet: 1 tab(s) orally once a day  lancets: 1 application subcutaneously 4 times a day  metFORMIN 500 mg oral tablet: 1 tab(s) orally 2 times a day  pyrazinamide 500 mg oral tablet: 3 tab(s) orally once a day  pyridoxine 50 mg oral tablet: 1 tab(s) orally once a day  rifAMPin 300 mg oral capsule: 2 cap(s) orally once a day  test strips (per patient&#x27;s insurance): 1 application subcutaneously 4 times a day. ** Compatible with patient&#x27;s glucometer **   FeroSul 325 mg (65 mg elemental iron) oral tablet: 1 tab(s) orally once a day  insulin glargine 100 units/mL subcutaneous solution: 12 solution subcutaneous once a day (at bedtime) Please administer 12 units once a day before going to bed.  Insulin Glargine Solostar Pen 100 units/mL subcutaneous solution: 12 unit(s) subcutaneous once a day (at bedtime)  insulin lispro 100 units/mL injectable solution: 12 unit(s) injectable 3 times a day (before meals) Please administer 12 units before meals, three times a day. Do not administer if skipping meal.  Insulin Lispro KwikPen 100 units/mL injectable solution: 12 unit(s) injectable 3 times a day (before meals)  isoniazid 300 mg oral tablet: 1 tab(s) orally once a day  metFORMIN 500 mg oral tablet: 1 tab(s) orally 2 times a day  pyrazinamide 500 mg oral tablet: 3 tab(s) orally once a day  pyridoxine 50 mg oral tablet: 1 tab(s) orally once a day  rifAMPin 300 mg oral capsule: 2 cap(s) orally once a day

## 2023-08-29 NOTE — DISCHARGE NOTE PROVIDER - HOSPITAL COURSE
RONDA EDMONDSON is a 48y (Greenlandic speaking) Male with a past medical history of alcohol use disorder (last drink 3 months ago), recent pulmonary TB diagnosis (on RIPE since 6/29/23), sent in by PCP for hyperglycemia to 600s, admitted for new onset diabetes. Pt presented to ED with glucose in 450s w/o signs of DKA and A1c 11.3. Glucose improved w/ fluids and 6 units insulin. Endocrinology consulted and started on insulin regimen. Stable for discharge with outpatient endocrinology follow-up.     Problem List/Main Diagnoses (system-based):   #New onset diabetes   Pt with newly diagnosed diabetes, found to have glucose of 657 on outpatient labs, started on metformin but had not taken dose. Presented with glucose in 400s w/o signs of DKA (UA w/o ketones, BHB 0.5, HCO3- 21, AG 11) and A1c of 11.3. Started on fluids and given 6u insulin w/ improvement of glucose. Endocrinology consulted for optimal inpatient/outpatient diabetes regimen.   Inpatient regimen: 15 u lantus at bedtime, 5 u lispro premeal, low iSS  Outpatient regimen: _______________  Home med: metformin 500 mg bid (has not taken dose yet).     Plan:   - c/w _________  - f/u with outpatient endocrinologist    #Microcytic anemia.   Pt presenting with Hgb 7.2, microcytic anemia. No signs of bleeding on exam. Pt with report of hematuria in past few months (UA neg for blood). Denies hematochezia or bleeding. Received _____ u pRBCs. Iron studies c/w _____. B12/folate/TSH _____.  - f/u with outpatient PCP    #Pulmonary Tuberculosis.   Pt diagnosed with TB on 06/29 at [facility], on RIPE therapy with improvement of symptoms. Reports improving cough, denies hemoptysis or fever/chills. CXR w/ b/l consolidation and possible cavity in L midlung c/w hx of TB. Last positive sputum culture (07/13), multiple negative cultures. Inpatient AFB culture (08/29) w/ ________. Stable for discharge on RIPE therapy.  Home meds: rifampin 600 mg bid, isoniazid 300 mg q24h, pyrazinamide 1500 mg q24h, vitamin B6  -c/w home meds    Patient was discharged to: home    New medications:   Changes to old medications:   Medications that were stopped: none    Items to follow up as outpatient: f/u anemia + pulmonary TB with outpatient PCP, endocrine follow-up    Physical exam at the time of discharge:   GENERAL: NAD, lying in bed comfortably  HEAD:  Atraumatic, normocephalic  EYES: PERRLA, conjunctiva and sclera clear  ENT: Moist mucous membranes  NECK: Supple, no JVD  HEART: Regular rate and rhythm, no murmurs, rubs, or gallops  LUNGS: Unlabored respirations.  Clear to auscultation bilaterally, no crackles, wheezing, or rhonchi  ABDOMEN: Soft, TTP in LUQ, mildly distended, +BS  EXTREMITIES: 2+ peripheral pulses bilaterally. b/l calf tenderness, 1+ pitting edema to ankles  NERVOUS SYSTEM:  A&Ox3, no focal deficits   SKIN: No rashes or lesions   RONDA EDMONDSON is a 48y (Slovenian speaking) Male with a past medical history of alcohol use disorder (last drink 3 months ago), recent pulmonary TB diagnosis (on RIPE since 6/29/23), sent in by PCP for hyperglycemia to 600s, admitted for new onset diabetes. Pt presented to ED with glucose in 450s w/o signs of DKA and A1c 11.3. Glucose improved w/ fluids and 6 units insulin. Endocrinology consulted and started on insulin regimen. Stable for discharge with outpatient endocrinology follow-up.     Problem List/Main Diagnoses (system-based):   #New onset diabetes   Pt with newly diagnosed diabetes, found to have glucose of 657 on outpatient labs, started on metformin but had not taken dose. Presented with glucose in 400s w/o signs of DKA (UA w/o ketones, BHB 0.5, HCO3- 21, AG 11) and A1c of 11.3. Started on fluids and given 6u insulin w/ improvement of glucose. Endocrinology consulted for optimal inpatient/outpatient diabetes regimen.   Inpatient regimen: 15 u lantus at bedtime, 10 u lispro premeal, low iSS  Outpatient regimen: _______________  Home med: metformin 500 mg bid (has not taken dose yet).     Plan:   - c/w _________  - f/u with outpatient endocrinologist    #Microcytic anemia.   Pt presenting with Hgb 7.2, microcytic anemia. No signs of bleeding on exam. Pt with report of hematuria in past few months (UA neg for blood). Denies hematochezia or bleeding. Received _____ u pRBCs. Iron studies c/w _____. B12/folate/TSH _____.  - f/u with outpatient PCP    #Pulmonary Tuberculosis.   Pt diagnosed with TB on 06/29 at [facility], on RIPE therapy with improvement of symptoms. Reports improving cough, denies hemoptysis or fever/chills. CXR w/ b/l consolidation and possible cavity in L midlung c/w hx of TB. Last positive sputum culture (07/13), multiple negative cultures. Inpatient AFB culture (08/29) w/ ________. Stable for discharge on RIPE therapy.  Home meds: rifampin 600 mg bid, isoniazid 300 mg q24h, pyrazinamide 1500 mg q24h, vitamin B6  -c/w home meds    Patient was discharged to: home    New medications:   Changes to old medications:   Medications that were stopped: none    Items to follow up as outpatient: f/u anemia + pulmonary TB with outpatient PCP, endocrine follow-up    Physical exam at the time of discharge:   GENERAL: NAD, lying in bed comfortably  HEAD:  Atraumatic, normocephalic  EYES: PERRLA, conjunctiva and sclera clear  ENT: Moist mucous membranes  NECK: Supple, no JVD  HEART: Regular rate and rhythm, no murmurs, rubs, or gallops  LUNGS: Unlabored respirations.  Clear to auscultation bilaterally, no crackles, wheezing, or rhonchi  ABDOMEN: Soft, TTP in LUQ, mildly distended, +BS  EXTREMITIES: 2+ peripheral pulses bilaterally. b/l calf tenderness, 1+ pitting edema to ankles  NERVOUS SYSTEM:  A&Ox3, no focal deficits   SKIN: No rashes or lesions   RONDA EDMONDSON is a 48y (French speaking) Male with a past medical history of alcohol use disorder (last drink 3 months ago), recent pulmonary TB diagnosis (on RIPE since 6/29/23), sent in by PCP for hyperglycemia to 600s, admitted for new onset diabetes. Pt presented to ED with glucose in 450s w/o signs of DKA and A1c 11.3. Glucose improved w/ fluids and 6 units insulin. Endocrinology consulted and started on insulin regimen. Stable for discharge with outpatient endocrinology follow-up.     Problem List/Main Diagnoses (system-based):   #New onset diabetes   Pt with newly diagnosed diabetes, found to have glucose of 657 on outpatient labs, started on metformin but had not taken dose. Presented with glucose in 400s w/o signs of DKA (UA w/o ketones, BHB 0.5, HCO3- 21, AG 11) and A1c of 11.3. Started on fluids and given 6u insulin w/ improvement of glucose. Endocrinology consulted for optimal inpatient/outpatient diabetes regimen.   Inpatient regimen: 15 u lantus at bedtime, 10 u lispro premeal, low iSS  Outpatient regimen: _______________  Home med: metformin 500 mg bid (has not taken dose yet).     Plan:   - c/w _________  - f/u with outpatient endocrinologist    #Microcytic anemia.   Pt presenting with Hgb 7.2, microcytic anemia. No signs of bleeding on exam. Pt with report of hematuria in past few months (UA neg for blood). Denies hematochezia or bleeding. Received _____ u pRBCs. Iron studies c/w _____. B12/folate/TSH _____.  - f/u with outpatient PCP    #Pulmonary Tuberculosis.   Pt diagnosed with TB on 06/29 at [facility], on RIPE therapy with improvement of symptoms. Reports improving cough, denies hemoptysis or fever/chills. CXR w/ b/l consolidation and possible cavity in L midlung c/w hx of TB. Last positive sputum culture (07/13), multiple negative cultures. Inpatient AFB culture (08/29) w/ ________. Stable for discharge on RIPE therapy.  Home meds: rifampin 600 mg bid, isoniazid 300 mg q24h, pyrazinamide 1500 mg q24h, vitamin B6  -c/w home meds  - B6 increased to 50mg given ongoing leg pain    Patient was discharged to: home    New medications:   Changes to old medications:   Medications that were stopped: none    Items to follow up as outpatient: f/u anemia + pulmonary TB with outpatient PCP, endocrine follow-up, needs colonoscopy    Physical exam at the time of discharge:   GENERAL: NAD, lying in bed comfortably  HEAD:  Atraumatic, normocephalic  EYES: PERRLA, conjunctiva and sclera clear  ENT: Moist mucous membranes  NECK: Supple, no JVD  HEART: Regular rate and rhythm, no murmurs, rubs, or gallops  LUNGS: Unlabored respirations.  Clear to auscultation bilaterally, no crackles, wheezing, or rhonchi  ABDOMEN: Soft, TTP in LUQ, mildly distended, +BS  EXTREMITIES: 2+ peripheral pulses bilaterally. b/l calf tenderness, 1+ pitting edema to ankles  NERVOUS SYSTEM:  A&Ox3, no focal deficits   SKIN: No rashes or lesions   RONDA EDMONDSON is a 48y (Bulgarian speaking) Male with a past medical history of alcohol use disorder (last drink 3 months ago), recent pulmonary TB diagnosis (on RIPE since 6/29/23), sent in by PCP for hyperglycemia to 600s, admitted for new onset diabetes. Pt presented to ED with glucose in 450s w/o signs of DKA and A1c 11.3. Glucose improved w/ fluids and 6 units insulin. Endocrinology consulted and started on insulin regimen. Stable for discharge with outpatient endocrinology follow-up.     Problem List/Main Diagnoses (system-based):   #New onset diabetes   Pt with newly diagnosed diabetes, found to have glucose of 657 on outpatient labs, started on metformin but had not taken dose. Presented with glucose in 400s w/o signs of DKA (UA w/o ketones, BHB 0.5, HCO3- 21, AG 11) and A1c of 11.3. Started on fluids and given 6u insulin w/ improvement of glucose. Endocrinology consulted for optimal inpatient/outpatient diabetes regimen.   - Discharged on 12 units lantus, 12units TID lispro, metformin 500 bid  - F/u with PCP and/or endocrinologist    #Microcytic anemia.   Pt presenting with Hgb 7.2, microcytic anemia. No signs of bleeding on exam. Pt with report of hematuria in past few months (UA neg for blood). Denies hematochezia or bleeding. Received 1u pRBCs. Given abd pain and hx of family cancer, concern for malignancy and would recommend outpatient work-up.  - f/u with outpatient PCP  - c/w daily iron sulfate 325mg    #Pulmonary Tuberculosis.   Pt diagnosed with TB on 06/29 at [facility], on RIPE therapy with improvement of symptoms. Reports improving cough, denies hemoptysis or fever/chills. CXR w/ b/l consolidation and possible cavity in L midlung c/w hx of TB. Last positive sputum culture (07/13), multiple negative cultures. Inpatient AFB culture (08/29) w/ ________. Stable for discharge on RIPE therapy.  Home meds: rifampin 600 mg bid, isoniazid 300 mg q24h, pyrazinamide 1500 mg q24h, vitamin B6  -c/w home meds  -B6 increased to 50mg given ongoing leg pain  - f/u with Dr. Jessica Horan at [address] [place] [address] on 9/5/23 at 10:30am     Patient was discharged to: home    Items to follow up as outpatient: f/u anemia + pulmonary TB with outpatient PCP, endocrine follow-up, needs colonoscopy    Physical exam at the time of discharge:   GENERAL: NAD, lying in bed comfortably  HEAD:  Atraumatic, normocephalic  EYES: PERRLA, conjunctiva and sclera clear  ENT: Moist mucous membranes  NECK: Supple, no JVD  HEART: Regular rate and rhythm, no murmurs, rubs, or gallops  LUNGS: Unlabored respirations.  Clear to auscultation bilaterally, no crackles, wheezing, or rhonchi  ABDOMEN: Soft, TTP in LUQ, mildly distended, +BS  EXTREMITIES: 2+ peripheral pulses bilaterally. b/l calf tenderness, 1+ edema to ankles  NERVOUS SYSTEM:  A&Ox3, no focal deficits   SKIN: No rashes or lesions   RONDA EDMONDSON is a 48y (Lithuanian speaking) Male with a past medical history of alcohol use disorder (last drink 3 months ago), recent pulmonary TB diagnosis (on RIPE since 6/29/23), sent in by PCP for hyperglycemia to 600s, admitted for new onset diabetes. Pt presented to ED with glucose in 450s w/o signs of DKA and A1c 11.3. Glucose improved w/ fluids and 6 units insulin. Endocrinology consulted and started on insulin regimen. Stable for discharge with outpatient endocrinology follow-up.     Problem List/Main Diagnoses (system-based):   #New onset diabetes   Pt with newly diagnosed diabetes, found to have glucose of 657 on outpatient labs, started on metformin but had not taken dose. Presented with glucose in 400s w/o signs of DKA (UA w/o ketones, BHB 0.5, HCO3- 21, AG 11) and A1c of 11.3. Started on fluids and given 6u insulin w/ improvement of glucose. Endocrinology consulted for optimal inpatient/outpatient diabetes regimen.   - Discharged on 12 units lantus, 12units TID lispro, metformin 500 bid  - F/u with PCP and/or endocrinologist    #Microcytic anemia.   Pt presenting with Hgb 7.2, microcytic anemia. No signs of bleeding on exam. Pt with report of hematuria in past few months (UA neg for blood). Denies hematochezia or bleeding. Received 1u pRBCs. Given abd pain and hx of family cancer, concern for malignancy and would recommend outpatient work-up.  - f/u with outpatient PCP  - c/w daily iron sulfate 325mg    #Pulmonary Tuberculosis.   Pt diagnosed with TB on 06/29 at [facility], on RIPE therapy with improvement of symptoms. Reports improving cough, denies hemoptysis or fever/chills. CXR w/ b/l consolidation and possible cavity in L midlung c/w hx of TB. Last positive sputum culture (07/13), multiple negative cultures. Follows with Dr. Duque at [facility].  Home meds: rifampin 600 mg bid, isoniazid 300 mg q24h, pyrazinamide 1500 mg q24h, vitamin B6  -c/w home meds  -B6 increased to 50mg given ongoing leg pain  - f/u with Dr. Jessica Horan at [address] [place] [address] on 9/5/23 at 10:30am     Patient was discharged to: home    Items to follow up as outpatient: f/u anemia + pulmonary TB with outpatient PCP, endocrine follow-up, needs colonoscopy    Physical exam at the time of discharge:   GENERAL: NAD, lying in bed comfortably  HEAD:  Atraumatic, normocephalic  EYES: PERRLA, conjunctiva and sclera clear  ENT: Moist mucous membranes  NECK: Supple, no JVD  HEART: Regular rate and rhythm, no murmurs, rubs, or gallops  LUNGS: Unlabored respirations.  Clear to auscultation bilaterally, no crackles, wheezing, or rhonchi  ABDOMEN: Soft, TTP in LUQ, mildly distended, +BS  EXTREMITIES: 2+ peripheral pulses bilaterally. b/l calf tenderness, 1+ edema to ankles  NERVOUS SYSTEM:  A&Ox3, no focal deficits   SKIN: No rashes or lesions

## 2023-08-29 NOTE — PROVIDER CONTACT NOTE (CRITICAL VALUE NOTIFICATION) - ACTION/TREATMENT ORDERED:
Dr. Reyes made aware and 1u PRBC ordered. PRBCs not ready at this time as per blood bank, endorsed to night BRENDA Casey to follow up on status of PRBCs.

## 2023-08-29 NOTE — DISCHARGE NOTE PROVIDER - CARE PROVIDER_API CALL
Micky Rojas  Primary Address:    [facility]    [address] [place] [address]  Phone: [phone]  Fax: [phone]   )    -  Scheduled Appointment: 09/11/2023   Micky Rojas  Primary Address:    [facility]    [address] [place] [address]  Phone: [phone]  Fax: (   )    -  Scheduled Appointment: 09/11/2023 11:45 AM

## 2023-08-29 NOTE — DISCHARGE NOTE PROVIDER - NSDCQMPCI_CARD_ALL_CORE
PRIMARY CARE PROVIDER:  Dr. Alonso at the [facility].



CHIEF COMPLAINT:  Nausea, vomiting, and abdominal pain.



HISTORY OF PRESENT ILLNESS:  This is a 28-year-old  female with significant

history of type 1 diabetes mellitus, on current insulin with Lantus and NovoLog, who

presents with 1-to 2-day history of increasing abdominal discomfort, nausea,

vomiting, and general malaise.  The patient with recurrent history of diabetic

ketoacidosis with most recent admission to [facility] from 12/29/2018

through 12/31/2018.  The patient states she has been compliant with her insulin

regimen and monitors her sugar using a sliding scale for meals with NovoLog.  The

patient does see her primary care provider as well, who has been adjusting her

insulin regimen.  Last hemoglobin A1c available in the electronic medical record

showed a value of 8.0 on 02/10/2018.  The patient denied any documented fever,

exposure history, recent illness or travel history.  The patient denied any specific

increased cough, shortness of breath, unilateral weakness, visual disturbance, or

dysuria.  In the emergency room, the patient underwent general evaluation with

elevated glucose into the 400 range as well as a carbon dioxide level less than 8.

Urinalysis showed glucosuria with ketones.  The patient was placed on insulin

infusion at 8 units/hour and initiated on DKA protocol with aggressive IV fluid

hydration.  The patient also received antiemetics and was referred to the

Hospitalist Service for admission. 



PAST MEDICAL HISTORY:  

1. Diabetes mellitus type 1 with recurrent DKA.

2. Depression.

3. Anxiety.

4. History of marijuana use.



PAST SURGICAL HISTORY:  Reviewed and negative.



CURRENT MEDICATIONS:  

1. Lantus 26 units subcutaneously at bedtime.

2. NovoLog sliding scale t.i.d. with meals.

3. Risperdal 2 mg p.o. at bedtime.

4. Fluoxetine 20 mg p.o. daily.

5. Wellbutrin  mg p.o. extended release daily.



ALLERGIES:  NO KNOWN DRUG ALLERGIES.



FAMILY HISTORY:  Positive for diabetes in her grandparents.



SOCIAL HISTORY:  The patient resides with her parents and grandparents in the [place], [place] area.  History of marijuana use.  History of tobacco use.  No alcohol. 



PSYCHIATRIC HISTORY:  Positive for admission to [facility] in 2019.



REVIEW OF SYSTEMS:  CONSTITUTIONAL:  Negative for weight loss or gain, ability to

conduct usual activities. 

SKIN:  Negative for rash, itching. 

EYES:  Negative for double vision, pain. 

ENT/MOUTH:  Negative for nose bleeding, neck stiffness, pain, tenderness. 

CARDIOVASCULAR:  Negative for palpitations, dyspnea on exertion, orthopnea. 

RESPIRATORY:  Negative for shortness of breath, wheezing, cough, hemoptysis, fever

or night sweats. 

GASTROINTESTINAL:  Negative for poor appetite, abdominal pain, heartburn, nausea,

vomiting, constipation, or diarrhea. 

GENITOURINARY:  Negative for urgency, frequency, dysuria, nocturia. 

MUSCULOSKELETAL:  Negative for pain, swelling. 

NEUROLOGIC/PSYCHIATRIC:  Negative for anxiety, depression. 

ALLERGY/IMMUNOLOGIC:  Negative for skin rash, bleeding tendency. 



Otherwise, negative except as stated per HPI.



PHYSICAL EXAMINATION:

VITAL SIGNS:  Currently, blood pressure 136/90, pulse 141, respiratory rate 20,

temperature 98.7, and O2 saturation is 98% on room air. 

GENERAL APPEARANCE:  This is a 28-year-old  female, alert and oriented x3,

pleasant, in no acute distress. 

HEENT:  Pupils are equal, round, and reactive to light and accommodation.

Extraocular muscles are intact.  No scleral icterus.  No conjunctival injection.

Nares patent.  OP is clear.  Teeth in fair repair. 

NECK:  Supple.  No cervical adenopathy.  No thyromegaly.  No carotid bruits.  No JVD

appreciated.  Cervical spine with full active and passive range of motion.  No

meningeal signs noted. 

CHEST:  Lungs are clear to auscultation bilaterally. 

CARDIOVASCULAR:  S1 and S2 with tachycardia.  No murmur, rub, or gallop appreciated. 

ABDOMEN:  Rounded, soft, nontender, and nondistended.  Bowel sounds are positive in

all 4 quadrants.  There is no hepatosplenomegaly.  No abdominal bruits.  No rebound

or guarding appreciated. 

EXTREMITIES:  Warm and dry with fair turgor.  No clubbing, cyanosis, or asymmetric

edema appreciated.  Pulses palpable distally at the dorsalis pedis, posterior

tibial, and popliteal arteries bilaterally.  Capillary refill less than 2 seconds. 

NEUROLOGIC:  Cranial nerves II through XII are grossly intact.  No focal or

lateralizing signs appreciated. 



PERTINENT LABORATORY AND X-RAY FINDINGS:  Sodium 133, potassium 5.3, chloride 103,

CO2 less than 8, BUN 17, creatinine 1.48, estimated GFR of 42, glucose 419, and

calcium 10.1.  LFTs within normal limits.  Albumin 5.0, lipase 7.  CBC showed a

white blood cell count of 22.0, hemoglobin 15.5, hematocrit 49.3, MCV 99.8, platelet

count 311 with 73% neutrophils, 12% bands.  Urinalysis positive for glucose,

protein, and ketones.  Urine hCG negative on 03/15/2019.  Beta-hydroxybutyrate level

8.35.  EKG dated 03/15/2019, by my interpretation shows sinus tachycardia with heart

rates in the 130s.  Normal axis.  No acute ST-T wave changes noted. 



ASSESSMENT AND PLAN:  

1. Diabetic ketoacidosis.  The patient will be admitted to the Intermediate Care

Unit.  We will continue diabetic ketoacidosis protocol with aggressive IV fluid

hydration.  We will continue insulin infusion currently at 8 units/hour, titrating

to clinical response and glucose levels.  No current evidence to suggest focal

infectious process.  Confirm home insulin regimen.  Check A1c level in the a.m. 

2. Nausea and vomiting.  We will continue IV fluids as outlined previously.

Antiemetics with Zofran 4 mg IV q.6 hours p.r.n.  Clear liquids as tolerated. 

3. Acute kidney injury.  Secondarily to volume depletion and diabetic ketoacidosis.

Avoid nephrotoxic agents and limit contrast exposure.  Continue IV fluids and repeat

creatinine in the a.m. 

4. Hyponatremia.  Suspect pseudohyponatremia given hyperglycemia.  We will continue

IV fluids and expect correction of hyponatremia with correction of glucose values. 

5. Sinus tachycardia.  Secondarily to diabetic ketoacidosis.  We will continue

telemetry monitoring in the Intermediate Care Unit.  Continue treatment of

underlying condition of diabetic ketoacidosis. 

6. Depression.  We will resume home antidepressant regimen and monitor clinically.

Continue Wellbutrin and Risperdal. 

7. Prophylaxis.  SCDs while in bed.  Pepcid 20 mg p.o. b.i.d.

8. Code status:  Full.  Surrogate medical decision maker is the patient's mother.







Job ID:  [id number] No

## 2023-08-29 NOTE — H&P ADULT - HISTORY OF PRESENT ILLNESS
HPI: 48 M PMHx of alcohol use disorder, recent dx pulm TB (started on INH/rifampim/PZA 6/29), last + sputum cx 7/13, mult neg cx since, no longer infectious referred from PMD for hyperglycemia on labs 8/24, glucose 657, c/o fatigue, polyuria/polydipsia x ?weeks.     In the ED:  Initial vital signs: T: 98 F, HR: 110, BP: XX, R: XX, SpO2: XX% on RA  ED course:   Labs: significant for  Imaging:  CXR:   EKG:   Medications:   Consults: none      HPI: 48 M PMHx of alcohol use disorder, recent dx pulm TB (started on INH/rifampim/PZA 6/29), last + sputum cx 7/13, mult neg cx since, no longer infectious referred from PMD for hyperglycemia on labs 8/24, glucose 657, c/o fatigue, polyuria/polydipsia x ?weeks.     In the ED:  Initial vital signs: T: 98 F, HR: 110, BP: 109/63, R: 16, SpO2: 100% on RA  ED course:   Labs: significant for WBC 4.36, Hgb 7.2 (microcytic), glucose 446 -> 301, HCO3- 21, AG 11, beta hydroxybutyrate 0.5, lipase 144, lactate neg, VBG w/ pH 7.33, UA: glucose +, otherwise wnl, RVP neg, HIV neg  Imaging:  CXR:   EKG: sinus tachycardia, , QTc 446  Medications: 2L NS, 6 units insulin  Consults: none      HPI: 48 [place]aguayan-speaking M PMHx of alcohol use disorder (last drink 3 months ago), pulmonary TB (on RIPE, diagnosed 06/29) sent in by PMD for glucose of 657 on outpatient labs (08/24). Per pt, has had polyuria and polydipsia for the last month. He also reports some LUQ abd pain, denies N/V, diarrhea. Was prescribed metformin on 08/26 but has not taken a dose. No hx of diabetes in the family, did not previously have diabetes diagnosis. He reports improvement of cough since starting RIPE, denies hemoptysis, fever/chills, running nose. Reports some CP in L chest for the past 3 months, likely related to TB. Denies bleeding, hematochezia.     In the ED:  Initial vital signs: T: 98 F, HR: 110, BP: 109/63, R: 16, SpO2: 100% on RA  ED course:   Labs: significant for WBC 4.36, Hgb 7.2 (microcytic), glucose 446 -> 301, HCO3- 21, AG 11, beta hydroxybutyrate 0.5, lipase 144, lactate neg, VBG w/ pH 7.33, UA: glucose +, otherwise wnl, RVP neg, HIV neg  Imaging:  CXR:   EKG: sinus tachycardia, , QTc 446  Medications: 2L NS, 6 units insulin  Consults: none      HPI: 48 Anguillan-speaking M PMHx of alcohol use disorder (last drink 3 months ago), pulmonary TB (on RIPE, diagnosed 06/29) sent in by PMD for glucose of 657 on outpatient labs (08/24). Per pt, has had polyuria and polydipsia for the last month. He also reports some LUQ abd pain, denies N/V, diarrhea. Was prescribed metformin on 08/26 but has not taken a dose. No hx of diabetes in the family, did not previously have diabetes diagnosis. He reports improvement of cough since starting RIPE, denies hemoptysis, fever/chills, running nose. Reports some CP in L chest for the past 3 months and pain in his b/l LE with swelling since being diagnosed with TB and started on RIPE. Reports some episodes of hematuria in the last few months, denies other sources of bleeding, or hematochezia.     In the ED:  Initial vital signs: T: 98 F, HR: 110, BP: 109/63, R: 16, SpO2: 100% on RA  ED course:   Labs: significant for WBC 4.36, Hgb 7.2 (microcytic), glucose 446 -> 301, HCO3- 21, AG 11, beta hydroxybutyrate 0.5, lipase 144, lactate neg, VBG w/ pH 7.33, UA: glucose +, otherwise wnl, RVP neg, HIV neg  Imaging:  CXR: Consolidation bilaterally with possible cavity left midlung. Lymphadenopathy. Findings consistent with history of tuberculosis  EKG: sinus tachycardia, , QTc 446  Medications: 2L NS, 6 units insulin  Consults: none      HPI: 47 yo Macedonian-speaking M PMHx of alcohol use disorder (last drink 3 months ago), pulmonary TB (on RIPE, diagnosed 06/29) sent in by PMD for glucose of 657 on outpatient labs (08/24). Per pt, has had polyuria and polydipsia for the last month. He also reports some LUQ abd pain, denies N/V, diarrhea. Was prescribed metformin on 08/26 but has not taken a dose. No hx of diabetes in the family, did not previously have diabetes diagnosis. He reports improvement of cough since starting RIPE, denies hemoptysis, fever/chills, running nose. Reports some CP in L chest for the past 3 months and pain in his b/l LE with swelling since being diagnosed with TB and started on RIPE. Reports some episodes of hematuria in the last few months, denies other sources of bleeding, or hematochezia.     In the ED:  Initial vital signs: T: 98 F, HR: 110, BP: 109/63, R: 16, SpO2: 100% on RA  ED course:   Labs: significant for WBC 4.36, Hgb 7.2 (microcytic), glucose 446 -> 301, HCO3- 21, AG 11, beta hydroxybutyrate 0.5, alk phos 153, lipase 144, lactate neg, VBG w/ pH 7.33, UA: glucose +, otherwise wnl, RVP neg, HIV neg  Imaging:  CXR: Consolidation bilaterally with possible cavity left midlung. Lymphadenopathy. Findings consistent with history of tuberculosis  EKG: sinus tachycardia, , QTc 446  Medications: 2L NS, 6 units insulin  Consults: none      HPI: 47 yo South Sudanese-speaking M PMHx of alcohol use disorder (last drink 3 months ago), pulmonary TB (on RIPE, diagnosed 06/29) sent in by PMD for glucose of 657 on outpatient labs (08/24). Per pt, has had polyuria and polydipsia for the last month. He also reports some LUQ abd pain, denies N/V, diarrhea. Was prescribed metformin on 08/26 by provider at [facility] but has not taken a dose. No hx of diabetes in the family, did not previously have diabetes diagnosis. He reports improvement of cough since starting RIPE, denies hemoptysis, fever/chills, runny nose. Reports some CP in L chest for the past 3 months and pain in his b/l LE with swelling since being diagnosed with TB and started on RIPE. Reports some episodes of hematuria in the last few months, denies other sources of bleeding, or hematochezia.     In the ED:  Initial vital signs: T: 98 F, HR: 110, BP: 109/63, R: 16, SpO2: 100% on RA  ED course:   Labs: significant for WBC 4.36, Hgb 7.2 (microcytic), glucose 446 -> 301, HCO3- 21, AG 11, beta hydroxybutyrate 0.5, alk phos 153, lipase 144, lactate neg, VBG w/ pH 7.33, UA: glucose +, otherwise wnl, RVP neg, HIV neg  Imaging:  CXR: Consolidation bilaterally with possible cavity left midlung. Lymphadenopathy. Findings consistent with history of tuberculosis  EKG: sinus tachycardia, , QTc 446  Medications: 2L NS, 6 units insulin  Consults: none

## 2023-08-29 NOTE — PATIENT PROFILE ADULT - HOW MUCH WEIGHT HAVE YOU LOST?
[address]: [facility]          Room # 9997/96   [[address]                                    [x] Unit Machine # 6 with centralized RO                                                                                                            Alarm Test:  Pass time 1309            [x] RO/Machine Log Complete    Machine Temp    36.0             Dialysate: pH  7.4    Conductivity: Meter 13.8     HD Machine  13.5      TCD: 13.8  Dialyzer Lot # [id number]     Blood Tubing Lot # [id number]    Saline Lot # [id number]     RENU Jalloh RN                Time:  0     24-33 lbs (3)

## 2023-08-29 NOTE — ED PROVIDER NOTE - CLINICAL SUMMARY MEDICAL DECISION MAKING FREE TEXT BOX
48 M poor historian, h/o etoh abuse (7beers daily, stopped 2 mons ago), recent dx pulm TB (started on INH/rifampim/PZA 6/29), last + sputum cx 7/13, mult neg cx since, no longer infectious referred from PMD for hyperglycemia on labs 8/24, glucose 657, c/o fatigue, polyuria/polydipsia x ?weeks.  on exam pt tachy, afebrile, ill but nontoxic appearing, abd soft/nt, a/ox3.  suspect dka, r/o infectious etiology, known TB- will continue to isolate.  will check labs, ekg, cxr, start ivf.

## 2023-08-29 NOTE — ED PROVIDER NOTE - OBJECTIVE STATEMENT
48 M poor historian, h/o etoh abuse (7beers daily, stopped 2 mons ago), recent dx pulm TB (started on INH/rifampim/PZA 6/29), last + sputum cx 7/13, mult neg cx since, no longer infectious referred from PMD for hyperglycemia on labs 8/24, glucose 657, c/o fatigue, polyuria/polydipsia x ?weeks.    also reports mild upper abd pain for past week. 48 M poor historian, h/o etoh abuse (7beers daily, stopped 2 mons ago), recent dx pulm TB (started on INH/rifampim/PZA 6/29), last + sputum cx 7/13, mult neg cx since, no longer infectious referred from PMD for hyperglycemia on labs 8/24, glucose 657, c/o fatigue, polyuria/polydipsia x ?weeks.  pt reports just feeling overall unwell.  also reports mild upper abd pain for past week.  denies h/o DM.  reports cough but denies hemoptysis.  denies f/c, chest pain, sob, nvd, melena/hematochezia, dysuria, trauma.  lives with mom, [place] 1996 [facility], no known h/o TB/incarceration.

## 2023-08-29 NOTE — H&P ADULT - PROBLEM SELECTOR PLAN 3
F: s/p 2L NS  E: Replete Mg <2 and K <4   N: Consistent carb  DVT ppx:   GI ppx: none  CODE STATUS:    Dispo: [facility] Pt diagnosed with TB on 06/29 on RIPE therapy with improvement of symptoms. Reports improving cough, denies hemoptysis or fever/chills. CXR w/ b/l consolidation and possible cavity in L midlung c/w hx of TB.   Home meds:     Plan:  -c/w home meds  - f/u AF[employer] sputum culture Pt diagnosed with TB on 06/29 on RIPE therapy with improvement of symptoms. Reports improving cough, denies hemoptysis or fever/chills. CXR w/ b/l consolidation and possible cavity in L midlung c/w hx of TB.   Home meds: rifampin 600 mg bid, isoniazid 300 mg q24h, pyrazinamide 1500 mg q24h, vitamin B6    Plan:  -c/w home meds  - f/u AFB sputum culture Pt diagnosed with TB on 06/29 at [facility] on RIPE therapy with improvement of symptoms. Reports improving cough, denies hemoptysis or fever/chills. CXR w/ b/l consolidation and possible cavity in L midlung c/w hx of TB. Last positive sputum culture (07/13), multiple negative cultures.   Home meds: rifampin 600 mg bid, isoniazid 300 mg q24h, pyrazinamide 1500 mg q24h, vitamin B6    Plan:  -c/w home meds  - f/u AFB sputum culture

## 2023-08-29 NOTE — H&P ADULT - PROBLEM SELECTOR PLAN 2
Pt presenting with Hgb 7.2, microcytic anemia. No signs of bleeding on exam. Pt with report of hematuria in past few months (UA neg for blood). Denies hematochezia or bleeding.     Plan:   - trend CBC  - f/u iron studies, B12/folate, TSH  - maintain active T+S  - transfuse for Hgb < 7

## 2023-08-29 NOTE — DISCHARGE NOTE PROVIDER - NSDCCPCAREPLAN_GEN_ALL_CORE_FT
PRINCIPAL DISCHARGE DIAGNOSIS  Diagnosis: Diabetes mellitus, new onset  Assessment and Plan of Treatment: You have a diagnosis of type 2 diabetes. This is a disorder that disrupts the way your body uses sugar. Type 2 diabetes usually causes no symptoms. When symptoms do occur, they include the need to urinate often, intense thirst and blurry vision. Even though type 2 diabetes might not make you feel sick, it can cause serious problems over time, if it is not treated. The disorder can lead to heart attacks, strokes, kidney disease, vision problems (or even blindness), pain or loss of feeling in the hands and feet, and the need to have fingers, toes, or other body parts removed (amputated). In addition to maintaining an active lifestyle, losing weight, eating right, and not smoking it is important to take your diabetes medications as directed to control your blood sugar and prevent the possible complications from this disease. While you were in the hospital, you were seen by an endocrinologist (a specialist of diabetes). They have started you on a new diabetes regimen. [Insert new regimen]. Please follow-up with  _____.  Tiene un diagnóstico de diabetes tipo 2. Susanna es un trastorno que altera la forma en que suh cuerpo usa el azúcar. La diabetes tipo 2 generalmente no causa síntomas. Cuando se presentan síntomas, incluyen necesidad de orinar con frecuencia, sed intensa y visión borrosa. Aunque es posible que la diabetes tipo 2 no le angus sentir enfermo, con el tiempo puede causar problemas graves si no se trata. El trastorno puede provocar ataques cardíacos, accidentes cerebrovasculares, enfermedades renales, problemas de visión (o incluso ceguera), dolor o pérdida de sensibilidad en las jagruti y los pies, y la necesidad de extirpar (amputar) los dedos de las jagruti y los pies u otras partes del cuerpo. Además de mantener un estilo de sandip activo, perder peso, comer amber y no fumar, es importante rayo los medicamentos para la diabetes según las indicaciones para controlar el nivel de azúcar en la palmira y prevenir las posibles complicaciones de esta enfermedad. Mientras      SECONDARY DISCHARGE DIAGNOSES  Diagnosis: Anemia  Assessment and Plan of Treatment: Anemia is the medical term for when a person has too few red blood cells. Red blood cells are the cells in your blood that carry oxygen. If you have too few red blood cells, your body does not get all the oxygen it needs. Most people with anemia have no symptoms. They find out they have it after their doctor does blood tests for another reason. People who do have symptoms might feel tired or weak, especially if they try to exercise or have headaches. If you experience these symptoms you should see your primary care provider for further evaluation. Please follow-up with  _____.   Anemia es el término médico para cuando alexandra persona tiene muy pocos glóbulos rojos. Los glóbulos rojos son las células de la palmira que transportan oxígeno. Si tiene muy pocos glóbulos rojos, suh cuerpo no recibe todo el oxígeno que necesita. La mayoría de las personas con anemia no presentan síntomas. Descubren que lo tienen después de que suh médico les hace análisis de palmira por otro motivo. Las personas que presentan síntomas pueden sentirse cansadas o débiles, especialmente si intentan hacer ejercicio o tienen yaneli de cecy. Si experimenta estos síntomas, debe consultar a suh proveedor de atención primaria para alexandra evaluación adicional. Por favor vaya a la noelle de seguimiento con el  _____.    Diagnosis: Pulmonary tuberculosis  Assessment and Plan of Treatment: You were diagnosed with tuberculosis and have been improving on your antibiotic therapy. Please continue to take 2 capsules of rifampin once a day, 1 capsule of isoniazid once a day, 3 capsules of pyrazinamide once a day, and vitamin B6 once a day. Please follow-up with your primary care doctor.   Le diagnosticaron con tuberculosis y boston sanjana con suh terapia de antibióticos. Continúe tomando 2 cápsulas de RIFAMPIN alexandra vez al día, 1 cápsula de ISONIAZID alexandra vez al día, 3 cápsulas de PYRAZINAMIDE alexandra vez al día y vitamina B6 alexandra vez al día. Por favor angus un seguimiento con suh médico de atención primaria.     PRINCIPAL DISCHARGE DIAGNOSIS  Diagnosis: Diabetes mellitus, new onset  Assessment and Plan of Treatment: You have a diagnosis of type 2 diabetes. This is a disorder that disrupts the way your body uses sugar. Type 2 diabetes usually causes no symptoms. When symptoms do occur, they include the need to urinate often, intense thirst and blurry vision. Even though type 2 diabetes might not make you feel sick, it can cause serious problems over time, if it is not treated. The disorder can lead to heart attacks, strokes, kidney disease, vision problems (or even blindness), pain or loss of feeling in the hands and feet, and the need to have fingers, toes, or other body parts removed (amputated). In addition to maintaining an active lifestyle, losing weight, eating right, and not smoking it is important to take your diabetes medications as directed to control your blood sugar and prevent the possible complications from this disease. While you were in the hospital, you were seen by an endocrinologist (a specialist of diabetes). They have started you on a new diabetes regimen: 12 units of lantus at night, 12 units of lispro with each meal, and metformin 500mg twice a day. Please follow-up with your primary care doctor.  Tiene un diagnóstico de diabetes tipo 2. Susanna es un trastorno que altera la forma en que suh cuerpo usa el azúcar. La diabetes tipo 2 generalmente no causa síntomas. Cuando se presentan síntomas, incluyen necesidad de orinar con frecuencia, sed intensa y visión borrosa. Aunque es posible que la diabetes tipo 2 no le angus sentir enfermo, con el tiempo puede causar problemas graves si no se trata. El trastorno puede provocar ataques cardíacos, accidentes cerebrovasculares, enfermedades renales, problemas de visión (o incluso ceguera), dolor o pérdida de sensibilidad en las jagruti y los pies, y la necesidad de extirpar (amputar) los dedos de las jagruti y los pies u otras partes del cuerpo. Además de mantener un estilo de sandip activo, perder peso, comer amber y no fumar, es importante rayo los medicamentos para la diabetes según las indicaciones para controlar el nivel      SECONDARY DISCHARGE DIAGNOSES  Diagnosis: Anemia  Assessment and Plan of Treatment: Anemia is the medical term for when a person has too few red blood cells. Red blood cells are the cells in your blood that carry oxygen. If you have too few red blood cells, your body does not get all the oxygen it needs. Most people with anemia have no symptoms. They find out they have it after their doctor does blood tests for another reason. People who do have symptoms might feel tired or weak, especially if they try to exercise or have headaches. If you experience these symptoms you should see your primary care provider for further evaluation.   Anemia es el término médico para cuando alexandra persona tiene muy pocos glóbulos rojos. Los glóbulos rojos son las células de la palmira que transportan oxígeno. Si tiene muy pocos glóbulos rojos, suh cuerpo no recibe todo el oxígeno que necesita. La mayoría de las personas con anemia no presentan síntomas. Descubren que lo tienen después de que suh médico les hace análisis de palmira por otro motivo. Las personas que presentan síntomas pueden sentirse cansadas o débiles, especialmente si intentan hacer ejercicio o tienen yaneli de cecy. Si experimenta estos síntomas, debe consultar a suh proveedor de atención primaria para alexandra evaluación adicional.    Diagnosis: Pulmonary tuberculosis  Assessment and Plan of Treatment: You were diagnosed with tuberculosis and have been improving on your antibiotic therapy. Please continue to take 2 capsules of rifampin once a day, 1 capsule of isoniazid once a day, 3 capsules of pyrazinamide once a day, and vitamin B6 once a day. Please follow-up with your primary care doctor.   Le diagnosticaron con tuberculosis y boston sanjana con suh terapia de antibióticos. Continúe tomando 2 cápsulas de RIFAMPIN alexandra vez al día, 1 cápsula de ISONIAZID alexandra vez al día, 3 cápsulas de PYRAZINAMIDE alexandra vez al día y vitamina B6 alexandra vez al día. Por favor angus un seguimiento con suh médico de atención primaria.

## 2023-08-29 NOTE — H&P ADULT - NSHPPHYSICALEXAM_GEN_ALL_CORE
GENERAL: NAD, lying in bed comfortably  HEAD:  Atraumatic, normocephalic  EYES: EOMI, PERRLA, conjunctiva and sclera clear  ENT: Moist mucous membranes  NECK: Supple, no JVD  HEART: Regular rate and rhythm, no murmurs, rubs, or gallops  LUNGS: Unlabored respirations.  Clear to auscultation bilaterally, no crackles, wheezing, or rhonchi  ABDOMEN: Soft, nontender, nondistended, +BS  EXTREMITIES: 2+ peripheral pulses bilaterally. No clubbing, cyanosis, or edema  NERVOUS SYSTEM:  A&Ox3, no focal deficits   SKIN: No rashes or lesions GENERAL: NAD, lying in bed comfortably  HEAD:  Atraumatic, normocephalic  EYES: PERRLA, conjunctiva and sclera clear  ENT: Moist mucous membranes  NECK: Supple, no JVD  HEART: Regular rate and rhythm, no murmurs, rubs, or gallops  LUNGS: Unlabored respirations.  Clear to auscultation bilaterally, no crackles, wheezing, or rhonchi  ABDOMEN: Soft, TTP in LUQ, mildly distended, +BS  EXTREMITIES: 2+ peripheral pulses bilaterally. b/l calf tenderness, 1+ pitting edema to ankles  NERVOUS SYSTEM:  A&Ox3, no focal deficits   SKIN: No rashes or lesions

## 2023-08-29 NOTE — ED PROVIDER NOTE - PHYSICAL EXAMINATION
Vitals reviewed  Gen: ill but nontoxic appearing, nad, defers to mother for most questions but answers questions appropriately   Skin: wwp, no rash/lesions  HEENT: ncat, eomi, dry oral mucosa   CV: tachycardia, regular rhythm, no audible m/r/g  Resp: symmetrical expansion, ctab, no w/r/r  Abd: nondistended, soft, nontender, no r/g, no cvat   LYNN: light brown stool noted, no blood or melena   Ext: FROM throughout, no peripheral edema  Neuro: alert/oriented x3, no focal deficits, steady gait

## 2023-08-29 NOTE — PATIENT PROFILE ADULT - FALL HARM RISK - HARM RISK INTERVENTIONS

## 2023-08-29 NOTE — ED ADULT TRIAGE NOTE - CHIEF COMPLAINT QUOTE
Pt states presents to the ED for elevated blood sugar on outpt clinic. Pt BS in 600s at clinic. PT endorses increased thirst, urination, hunger, and pain in LLQ. Pt being treated for TB started medications in June 2023.

## 2023-08-29 NOTE — H&P ADULT - PROBLEM SELECTOR PLAN 4
F: s/p 2L NS  E: Replete Mg <2 and K <4   N: Consistent carb  DVT ppx:   GI ppx: none  CODE STATUS:    Dispo: [facility] F: s/p 2L NS  E: Replete Mg <2 and K <4   N: Consistent carb  DVT ppx: SCDs  GI ppx: none    Dispo: [facility]

## 2023-08-29 NOTE — H&P ADULT - PROBLEM SELECTOR PLAN 1
Pt with newly diagnosed diabetes, found to have glucose of 657 on outpatient labs. Now s/p 2L NS and 6 units insulin with glucose 446 -> 301. Found to have A1c 11.3.  Home med: metformin 500 mg bid (has not taken dose yet).     Plan:   - c/w miSS  - monitor FS  - endo consulted, appreciate recs Pt with newly diagnosed diabetes, found to have glucose of 657 on outpatient labs. Now s/p 2L NS and 6 units insulin with glucose 446 -> 301. UA w/o ketones, BHB 0.5 without acidosis or AG, not in DKA. Found to have A1c 11.3.   Home med: metformin 500 mg bid (has not taken dose yet).     Plan:   - endo consulted, appreciate recs  - c/w lantus 15 units at bedtime  - c/w 5 units lispro premeal  - c/w low iSS  - monitor FS Pt with newly diagnosed diabetes, found to have glucose of 657 on outpatient labs. Now s/p 2L NS and 6 units insulin with glucose 446 -> 301. UA w/o ketones, BHB 0.5 without acidosis or AG, not in DKA. Found to have A1c 11.3.   Home med: metformin 500 mg bid (has not taken dose yet).     Plan:   - endo consulted, appreciate recs  - c/w lantus 15 units at bedtime  - c/w 5 units lispro premeal  - c/w low iSS  - f/u c-peptide, insulin antibody  - monitor FS

## 2023-08-29 NOTE — ED ADULT NURSE NOTE - NSFALLUNIVINTERV_ED_ALL_ED
Bed/Stretcher in lowest position, wheels locked, appropriate side rails in place/Call bell, personal items and telephone in reach/Instruct patient to call for assistance before getting out of bed/chair/stretcher/Non-slip footwear applied when patient is off stretcher/[place] to call system/Physically safe environment - no spills, clutter or unnecessary equipment/Purposeful proactive rounding/Room/bathroom lighting operational, light cord in reach

## 2023-08-29 NOTE — H&P ADULT - ATTENDING COMMENTS
47 y/o M with above PMH presents for polyuria, polydipsia and elevate blood glucose. Patient denies fevers, no chills, reports decreased PO intake per mother, however he reports weight gain since initiation of TB tx. Denies other symptoms. Denies chest pain, no palpitations, no dizziness, denies any bleeding, reports was told previously he has anemia, unsure of Hb level, Reports follows up at [facility]    Exam as above    Labs, imaging reviewed    New onset DM  Microcytic anemia  Pulmonary TB    New onset DM, received insulin and IVF in ED. with improvement in FS, will get Endocrinology evaluation  Patient denies active bleeding, get collateral re baseline Hb, send Iron studies. Patient noted with protein gap, further testing based on results. Target Hb>7, maintain active type and screen. Age appropriate screening as outpatient  On RIPE for the last 2 months, tolerating. Cough resolved. Isolation per infection control protocol  DVT ppx, monitor Hb

## 2023-08-29 NOTE — H&P ADULT - ASSESSMENT
49 yo Italian-speaking M PMHx of alcohol use disorder (last drink 3 months ago), pulmonary TB (on RIPE, diagnosed 06/29) sent in by PMD for hyperglycemia on outpatient labs i/s/o newly diagnosed diabetes. Pt s/p 2L NS and 6 units insulin with downtrending glucose. Admitted for management of hyperglycemia and optimization of diabetes regimen.

## 2023-08-29 NOTE — ED ADULT NURSE REASSESSMENT NOTE - NS ED NURSE REASSESS COMMENT FT1
Patient a/oX 3, no SOB , no new symptom complaint.  Vital signs stable.  NSS 2L bolus completed, tolerted well  Repeat FSBG 373;  regular insulin 6 units IVP to be administered.  Stable  and comfortable.

## 2023-08-29 NOTE — ED PROVIDER NOTE - PROGRESS NOTE DETAILS
labs w/ hyperglycemia, not in DKA.  UA no infection.  cxr w/ consolidation c/w known TB.  given 2L NS and 6units insulin.  will admit for new onset DM.

## 2023-08-29 NOTE — ED PROVIDER NOTE - ATTENDING APP SHARED VISIT CONTRIBUTION OF CARE
48 M poor historian, h/o etoh abuse (7beers daily, stopped 2 mons ago), recent dx pulm TB (started on INH/rifampim/PZA 6/29), last + sputum cx 7/13, mult neg cx since, no longer infectious referred from PMD for hyperglycemia on labs 8/24, glucose 657, c/o fatigue, polyuria/polydipsia x ?weeks.  pt reports just feeling overall unwell.  also reports mild upper abd pain for past week.  denies h/o DM.  reports cough but denies hemoptysis.  denies f/c, chest pain, sob, nvd, melena/hematochezia, dysuria, trauma.  lives with mom, [place], no known h/o TB/incarceration.    on exam pt tachy, afebrile, ill but nontoxic appearing, abd soft/nt, a/ox3.  suspect dka, r/o infectious etiology, known TB- will continue to isolate.  will check labs, ekg, cxr, start ivf.    labs w/ hyperglycemia, not in DKA.  UA no infection.  cxr w/ consolidation c/w known TB.  given 2L NS and 6units insulin.  will admit for new onset DM.    Agree with above note as documented by PA.  I was available as the supervising attending during patient's ER evaluation.

## 2023-08-29 NOTE — H&P ADULT - NSHPSOCIALHISTORY_GEN_ALL_CORE
Work:  Tobacco use:   EtOH use:  Illicit drug use:    Living situation: Work: Does not work.   Tobacco use: former smoker, quit 4-5 months ago, used to smoke 1 pack every 2 to 3 days  EtOH use: former drinker, quit 3 months ago, used to drink 6 beers a day    Living situation: Lives with mother. [place] [facility] and has lived in [place] over [place]. Work: Does not work.   Tobacco use: former smoker, quit 4-5 months ago, used to smoke 1 pack every 2 to 3 days  EtOH use: former drinker, quit 3 months ago, used to drink 6 beers a day  Drugs: denies    Living situation: [place] with mother. [place] and has lived [place] the [place] for over [place] years.

## 2023-08-29 NOTE — ED PROVIDER NOTE - WR ORDER STATUS 1
"    10/6/2022         RE: Preeti Pascual  [address]  [facility] [address]      [facility]  Date of Visit: Oct 6, 2022   Oncologist: Dr. Agustin Kyle    Reason for Visit: hepatocellular carcinoma, triage add-on visit     Oncology HPI:  Preeti is 59 year old with compensated cirrhosis due to chronic hepatitis B with hepatocellular carcinoma initially discovered in 2016.  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  He had initial therapy with sorafenib which he tolerated only at a reduced dose of 200 mg/day and on which he progressed.  He had a long period of control with single agent nivolumab and when he progressed for a few months with ipilimumab/nivolumab.  In August 2021 he was switched to Ramucirumab, but did not actually start for many months due to severe hepatitis thought to be immune mediated due to his prior therapy.  His AFP tumor marker improved and his disease was radiographically stable on the Ramucirumab, but then he needed to travel out of the [place] and was switched to lenvatinib which had to be dose reduced due to toxicity.      He last saw Dr. Kyle on 8/29/22 for ongoing assessment. He had restaging scans done at that visit which showed progressive metastatic hepatocellular carcinoma. He was recommended to restart therapy with Ramucirumab and stop Lenvatinib. He restarted Ramucirumab on 9/8/22 and so far is s/p 2 doses.     Last received Ramucirumab on 9/22. Next dose due today 10/6.     Interval History:   Preeti presents for triage add-on visit today. He complains of abdominal pain which initially started as intermittent x1 month. After his last visit with Dr. Kyle on 8/29, it started becoming more persistent and \"severe\" per patient report. Last week, he had severe pain rating as 9/10. He was also feeling fatigued and " experiencing headaches. He was offered multiple appointment times to come in but he was unable to come due to work and he prefers morning appointments.     Today, he feels overall a little better compared to last week but still with persistent abdominal pain and occasional headaches. Rates abdominal pain as 6-7/10 today. He says when he is concentrated at work, the pain is not as bothersome. He has not tried taking any pain medicines. He finds comfort in putting pressure in different areas of his abdomen when he is experiencing pain. He reports that pain is not localized to one spot. It moves around from right to left. He has tried tylenol in the past for this same pain and says it made it worse, so he has not tried it again. He is also worried that tylenol would affect his liver enzymes. He does have some heartburn. He denies nausea/vomiting or constipation/diarrhea. No fevers or chills or c/f infection. No blood in urine or stool. Appetite is somewhat low, but starting to improve in the last 2 days.       Physical Exam:   /81   Pulse 68   Temp 97.3  F (36.3  C)   Resp 16   Wt 63.6 kg (140 lb 4.8 oz)   SpO2 99%   BMI 22.66 kg/m     General: well appearing, no acute distress  HEENT: normocephalic, atraumatic, PERRLA, sclerae nonicteric  CV: regular rate and rhythm, no murmurs  Lungs: clear to auscultation bilaterally, no wheezes/rales/rhonchi  Abd: soft, positive bowel sounds, non-distended, some tenderness to palpation to RUQ and epigastric region, no rebound tenderness  MSK: full range of motion in all four extremities, no peripheral edema  Neuro: alert and oriented x3, CN grossly intact   Psych: appropriate mood and affect  Skin: no rashes or lesions      Laboratory Data: Reviewed labs today. AFP and UA pending.    Latest Reference Range & Units 10/06/22 08:15   Sodium 136 - 145 mmol/L 137   Potassium 3.4 - 5.3 mmol/L 4.1   Chloride 98 - 107 mmol/L 105   Carbon Dioxide (CO2) 22 - 29 mmol/L 23   Urea  Nitrogen 6.0 - 20.0 mg/dL 8.4   Creatinine 0.67 - 1.17 mg/dL 0.62 (L)   GFR Estimate >60 mL/min/1.73m2 >90   Calcium 8.6 - 10.0 mg/dL 9.0   Anion Gap 7 - 15 mmol/L 9   Magnesium 1.7 - 2.3 mg/dL 2.1   Albumin 3.5 - 5.2 g/dL 3.7   Protein Total 6.4 - 8.3 g/dL 7.5   Alkaline Phosphatase 40 - 129 U/L 180 (H)   ALT 10 - 50 U/L 40   AST 10 - 50 U/L 84 (H)   Bilirubin Total <=1.2 mg/dL 0.5   Glucose 70 - 99 mg/dL 98   WBC 4.0 - 11.0 10e3/uL 3.5 (L)   Hemoglobin 13.3 - 17.7 g/dL 12.2 (L)   Hematocrit 40.0 - 53.0 % 36.3 (L)   Platelet Count 150 - 450 10e3/uL 153   RBC Count 4.40 - 5.90 10e6/uL 4.24 (L)   MCV 78 - 100 fL 86   MCH 26.5 - 33.0 pg 28.8   MCHC 31.5 - 36.5 g/dL 33.6   RDW 10.0 - 15.0 % 15.1 (H)   % Neutrophils % 43   % Lymphocytes % 28   % Monocytes % 11   % Eosinophils % 16   % Basophils % 2   Absolute Basophils 0.0 - 0.2 10e3/uL 0.1   Absolute Eosinophils 0.0 - 0.7 10e3/uL 0.6   Absolute Immature Granulocytes <=0.4 10e3/uL 0.0   Absolute Lymphocytes 0.8 - 5.3 10e3/uL 1.0   Absolute Monocytes 0.0 - 1.3 10e3/uL 0.4   % Immature Granulocytes % 0   Absolute Neutrophils 1.6 - 8.3 10e3/uL 1.6   Absolute NRBCs 10e3/uL 0.0   NRBCs per 100 WBC <1 /100 0   Protein Albumin Urine Negative mg/dL Negative       Last AFP drawn 8/29: 9913 (up from 1210).       Imaging:   CT Abdomen w/o and w/ Chest Pelvis w/ contrast (8/29/22) showed:  IMPRESSION:  In this 59-year-old male with history of hepatitis B and  known hepatocellular carcinoma status post Y-90  treatment, there is  evidence of worsening disease with metastatic lesions:  1. Increased size of residual nodular arterial enhancement with early  washout measuring up to 5 cm with extracapsular involvement consistent  with OPTN 5x.  2. Right upper quadrant increased size of peritoneal implants with  trace probable malignant ascites.  3. Increasing left adrenal and stable right adrenal nodules.  4. Again visualized portal hepatis and portacaval lymphadenopathy.  5. Based on  this exam only, the patient is not Prasad criteria.        Assessment/Plan:     # Abdominal pain  # heartburn   - Was previously intermittent x1 month, now more persistent since last visit with Dr. Kyle on 8/29/22. Last week with severe pain 9/10. Improved this week. He has not tried taking any analgesics. He previously tried tylenol for this same pain and reports pain got worse after, so he has not tried tylenol again.   - Today, doing better with pain level 6-7/10. ?Possibly related to Ramucirumab (25% incidence of abdominal pain per UTD) vs cancer pain related to disease progression. Now s/p 2 doses of Ramucirumab. AST is elevated to 84, ALT is wnl, T bili is wnl. AFP tumor marker is pending. AFP tumor marker last checked on 8/29 with level of 9913. We discussed pushing up his restaging scans to assess disease status and look for alternative causes of abdominal pain, however, if this shows disease progression, it would be too early to say that Ramucirumab is not working. Discussed case with Dr. Kyle and given that his abdominal pain is improved, we recommend trying tylenol (keep max daily dose <2g) and supportive cares with close monitoring for now and proceeding with Ramucirumab infusion today. Reviewed plan with patient who is in agreement. We discussed symptoms to monitor for and when to seek immediate medical attention/proceed to ED and he verbalized understanding.   - If abdominal pain worsens, we will need to push up CT scans.   - Will also check UA to r/o UTI since he provided urine sample today though this is low on the differential.   - Will send [facility]   - RTC for visit with Nallely Smiley CNP next week for close monitoring. Patient requesting for virtual visit due to work.         # Occasional Headaches  - Improved, stable at this time. Encouraged PO hydration. Tylenol PRN above as needed (keep max daily dose <2g).   - Continue to monitor.       # Progressive metastatic hepatocellular carcinoma in  a patient with an ECOG performance status of 1 and well compensated liver disease.      At last visit with Dr. Kyle on 8/29, he discussed several options with patient including higher dose of lenvatinib, other TKIs, and research studies but his chronic liver disease is severe enough that it would preclude most of these.   Ultimately, they agreed to return to therapy with Ramucirumab as his disease was stable to improved when he was previously on this therapy. The plan is for re-assessment of his disease status in 2 months. He currently has scans and follow-up with Dr. Kyle scheduled on 10/31/22.     He restarted Ramucirumab on 9/8 and so far is s/p 2 doses. Will proceed with Ramucirumab infusion today.     Plan of care discussed with Dr. Kyle.     50 minutes spent on the date of the encounter doing chart review, review of test results, interpretation of tests, patient visit, documentation and discussion with other provider(s)     Lulu Blackman PA-C  [facility]  [address], [place] [address] [phone]       Performed Resulted

## 2023-08-29 NOTE — ED ADULT NURSE REASSESSMENT NOTE - NS ED NURSE REASSESS COMMENT FT1
Patient a/oX 3, anxious, c/o of productive cough and fatigue, no fever/chills, w/ increased thirst and frequent urination, no chest pain, no SOB.  Sinus tacchycardia on EKG, other vitals stable.  Right FA PIV #20 in place, all labs sent,n o complications.  Isolation observed for Hx of + PTB.  REsults and disposition pending.

## 2023-08-29 NOTE — H&P ADULT - NSHPLABSRESULTS_GEN_ALL_CORE
LABS:                         7.2    4.36  )-----------( 346      ( 29 Aug 2023 12:18 )             24.7         132<L>  |  100  |  14  ----------------------------<  447<H>  4.4   |  21<L>  |  0.61    Ca    9.9      29 Aug 2023 12:18  Mg     2.0         TPro  8.6<H>  /  Alb  3.8  /  TBili  0.6  /  DBili  x   /  AST  20  /  ALT  13  /  AlkPhos  153<H>      PT/INR - ( 29 Aug 2023 12:18 )   PT: 12.8 sec;   INR: 1.13          PTT - ( 29 Aug 2023 12:18 )  PTT:36.4 sec  Urinalysis Basic - ( 29 Aug 2023 12:18 )    Color: Yellow / Appearance: Clear / S.010 / pH: x  Gluc: 447 mg/dL / Ketone: NEGATIVE  / Bili: Negative / Urobili: 0.2 E.U./dL   Blood: x / Protein: NEGATIVE mg/dL / Nitrite: NEGATIVE   Leuk Esterase: NEGATIVE / RBC: x / WBC x   Sq Epi: x / Non Sq Epi: x / Bacteria: x    Lactate, Blood: 1.5 mmol/L ( @ 12:12)    RADIOLOGY, EKG & ADDITIONAL TESTS: Reviewed.

## 2023-08-30 DIAGNOSIS — R10.32 LEFT LOWER QUADRANT PAIN: ICD-10-CM

## 2023-08-30 DIAGNOSIS — M79.605 PAIN IN LEFT LEG: ICD-10-CM

## 2023-08-30 LAB
ALBUMIN SERPL ELPH-MCNC: 3.6 G/DL — SIGNIFICANT CHANGE UP (ref 3.3–5)
ALP SERPL-CCNC: 114 U/L — SIGNIFICANT CHANGE UP (ref 40–120)
ALT FLD-CCNC: 12 U/L — SIGNIFICANT CHANGE UP (ref 10–45)
ANION GAP SERPL CALC-SCNC: 8 MMOL/L — SIGNIFICANT CHANGE UP (ref 5–17)
AST SERPL-CCNC: 22 U/L — SIGNIFICANT CHANGE UP (ref 10–40)
BASOPHILS # BLD AUTO: 0.04 K/UL — SIGNIFICANT CHANGE UP (ref 0–0.2)
BASOPHILS NFR BLD AUTO: 1.1 % — SIGNIFICANT CHANGE UP (ref 0–2)
BILIRUB SERPL-MCNC: 0.9 MG/DL — SIGNIFICANT CHANGE UP (ref 0.2–1.2)
BUN SERPL-MCNC: 9 MG/DL — SIGNIFICANT CHANGE UP (ref 7–23)
C PEPTIDE SERPL-MCNC: 1 NG/ML — LOW (ref 1.1–4.4)
CALCIUM SERPL-MCNC: 9.2 MG/DL — SIGNIFICANT CHANGE UP (ref 8.4–10.5)
CHLORIDE SERPL-SCNC: 105 MMOL/L — SIGNIFICANT CHANGE UP (ref 96–108)
CO2 SERPL-SCNC: 19 MMOL/L — LOW (ref 22–31)
CREAT SERPL-MCNC: 0.46 MG/DL — LOW (ref 0.5–1.3)
EGFR: 130 ML/MIN/1.73M2 — SIGNIFICANT CHANGE UP
EOSINOPHIL # BLD AUTO: 0.14 K/UL — SIGNIFICANT CHANGE UP (ref 0–0.5)
EOSINOPHIL NFR BLD AUTO: 3.8 % — SIGNIFICANT CHANGE UP (ref 0–6)
FERRITIN SERPL-MCNC: 35 NG/ML — SIGNIFICANT CHANGE UP (ref 30–400)
FOLATE SERPL-MCNC: 11.4 NG/ML — SIGNIFICANT CHANGE UP
GLUCOSE BLDC GLUCOMTR-MCNC: 170 MG/DL — HIGH (ref 70–99)
GLUCOSE BLDC GLUCOMTR-MCNC: 236 MG/DL — HIGH (ref 70–99)
GLUCOSE BLDC GLUCOMTR-MCNC: 253 MG/DL — HIGH (ref 70–99)
GLUCOSE BLDC GLUCOMTR-MCNC: 322 MG/DL — HIGH (ref 70–99)
GLUCOSE SERPL-MCNC: 276 MG/DL — HIGH (ref 70–99)
HCT VFR BLD CALC: 25.4 % — LOW (ref 39–50)
HCT VFR BLD CALC: 26.1 % — LOW (ref 39–50)
HGB BLD-MCNC: 7.6 G/DL — LOW (ref 13–17)
HGB BLD-MCNC: 7.7 G/DL — LOW (ref 13–17)
IMM GRANULOCYTES NFR BLD AUTO: 0.3 % — SIGNIFICANT CHANGE UP (ref 0–0.9)
IRON SATN MFR SERPL: 35 UG/DL — LOW (ref 45–165)
IRON SATN MFR SERPL: 9 % — LOW (ref 16–55)
LYMPHOCYTES # BLD AUTO: 0.95 K/UL — LOW (ref 1–3.3)
LYMPHOCYTES # BLD AUTO: 25.5 % — SIGNIFICANT CHANGE UP (ref 13–44)
MAGNESIUM SERPL-MCNC: 1.8 MG/DL — SIGNIFICANT CHANGE UP (ref 1.6–2.6)
MCHC RBC-ENTMCNC: 20.4 PG — LOW (ref 27–34)
MCHC RBC-ENTMCNC: 20.9 PG — LOW (ref 27–34)
MCHC RBC-ENTMCNC: 29.1 GM/DL — LOW (ref 32–36)
MCHC RBC-ENTMCNC: 30.3 GM/DL — LOW (ref 32–36)
MCV RBC AUTO: 69 FL — LOW (ref 80–100)
MCV RBC AUTO: 70.2 FL — LOW (ref 80–100)
MONOCYTES # BLD AUTO: 0.46 K/UL — SIGNIFICANT CHANGE UP (ref 0–0.9)
MONOCYTES NFR BLD AUTO: 12.3 % — SIGNIFICANT CHANGE UP (ref 2–14)
NEUTROPHILS # BLD AUTO: 2.13 K/UL — SIGNIFICANT CHANGE UP (ref 1.8–7.4)
NEUTROPHILS NFR BLD AUTO: 57 % — SIGNIFICANT CHANGE UP (ref 43–77)
NRBC # BLD: 0 /100 WBCS — SIGNIFICANT CHANGE UP (ref 0–0)
NRBC # BLD: 0 /100 WBCS — SIGNIFICANT CHANGE UP (ref 0–0)
PHOSPHATE SERPL-MCNC: 4.7 MG/DL — HIGH (ref 2.5–4.5)
PLATELET # BLD AUTO: 269 K/UL — SIGNIFICANT CHANGE UP (ref 150–400)
PLATELET # BLD AUTO: 314 K/UL — SIGNIFICANT CHANGE UP (ref 150–400)
POTASSIUM SERPL-MCNC: 3.9 MMOL/L — SIGNIFICANT CHANGE UP (ref 3.5–5.3)
POTASSIUM SERPL-SCNC: 3.9 MMOL/L — SIGNIFICANT CHANGE UP (ref 3.5–5.3)
PROT SERPL-MCNC: 7.8 G/DL — SIGNIFICANT CHANGE UP (ref 6–8.3)
RBC # BLD: 3.68 M/UL — LOW (ref 4.2–5.8)
RBC # BLD: 3.72 M/UL — LOW (ref 4.2–5.8)
RBC # FLD: 16.5 % — HIGH (ref 10.3–14.5)
RBC # FLD: 16.6 % — HIGH (ref 10.3–14.5)
SODIUM SERPL-SCNC: 132 MMOL/L — LOW (ref 135–145)
TIBC SERPL-MCNC: 397 UG/DL — SIGNIFICANT CHANGE UP (ref 220–430)
TSH SERPL-MCNC: 2.22 UIU/ML — SIGNIFICANT CHANGE UP (ref 0.27–4.2)
UIBC SERPL-MCNC: 362 UG/DL — SIGNIFICANT CHANGE UP (ref 110–370)
VIT B12 SERPL-MCNC: 933 PG/ML — SIGNIFICANT CHANGE UP (ref 232–1245)
WBC # BLD: 3.73 K/UL — LOW (ref 3.8–10.5)
WBC # BLD: 5.31 K/UL — SIGNIFICANT CHANGE UP (ref 3.8–10.5)
WBC # FLD AUTO: 3.73 K/UL — LOW (ref 3.8–10.5)
WBC # FLD AUTO: 5.31 K/UL — SIGNIFICANT CHANGE UP (ref 3.8–10.5)

## 2023-08-30 PROCEDURE — 99232 SBSQ HOSP IP/OBS MODERATE 35: CPT | Mod: GC

## 2023-08-30 RX ORDER — INSULIN LISPRO 100/ML
10 VIAL (ML) SUBCUTANEOUS
Refills: 0 | Status: DISCONTINUED | OUTPATIENT
Start: 2023-08-30 | End: 2023-08-31

## 2023-08-30 RX ORDER — INSULIN LISPRO 100/ML
VIAL (ML) SUBCUTANEOUS AT BEDTIME
Refills: 0 | Status: DISCONTINUED | OUTPATIENT
Start: 2023-08-30 | End: 2023-08-31

## 2023-08-30 RX ORDER — FERROUS SULFATE 325(65) MG
325 TABLET ORAL DAILY
Refills: 0 | Status: DISCONTINUED | OUTPATIENT
Start: 2023-08-30 | End: 2023-08-31

## 2023-08-30 RX ORDER — INSULIN LISPRO 100/ML
VIAL (ML) SUBCUTANEOUS
Refills: 0 | Status: DISCONTINUED | OUTPATIENT
Start: 2023-08-30 | End: 2023-08-31

## 2023-08-30 RX ORDER — PYRIDOXINE HCL (VITAMIN B6) 100 MG
50 TABLET ORAL DAILY
Refills: 0 | Status: DISCONTINUED | OUTPATIENT
Start: 2023-08-30 | End: 2023-08-31

## 2023-08-30 RX ADMIN — Medication 5 UNIT(S): at 08:50

## 2023-08-30 RX ADMIN — PYRAZINAMIDE 1500 MILLIGRAM(S): 500 TABLET ORAL at 18:14

## 2023-08-30 RX ADMIN — Medication 50 MILLIGRAM(S): at 12:23

## 2023-08-30 RX ADMIN — INSULIN GLARGINE 15 UNIT(S): 100 INJECTION, SOLUTION SUBCUTANEOUS at 21:59

## 2023-08-30 RX ADMIN — Medication 5 UNIT(S): at 12:22

## 2023-08-30 RX ADMIN — Medication 10 UNIT(S): at 18:13

## 2023-08-30 RX ADMIN — Medication 1: at 08:50

## 2023-08-30 RX ADMIN — Medication 6: at 00:01

## 2023-08-30 RX ADMIN — Medication 325 MILLIGRAM(S): at 18:15

## 2023-08-30 RX ADMIN — Medication 300 MILLIGRAM(S): at 18:14

## 2023-08-30 RX ADMIN — Medication 4: at 22:00

## 2023-08-30 RX ADMIN — Medication 1000 MILLIGRAM(S): at 00:14

## 2023-08-30 RX ADMIN — Medication 3: at 12:23

## 2023-08-30 RX ADMIN — Medication 1000 MILLIGRAM(S): at 01:14

## 2023-08-30 RX ADMIN — Medication 5 MILLIGRAM(S): at 01:43

## 2023-08-30 RX ADMIN — Medication 4: at 18:13

## 2023-08-30 NOTE — PROGRESS NOTE ADULT - PROBLEM SELECTOR PLAN 1
Pt with newly diagnosed diabetes, found to have glucose of 657 on outpatient labs. Now s/p 2L NS and 6 units insulin with glucose 446 -> 301. UA w/o ketones, BHB 0.5 without acidosis or AG, not in DKA. Found to have A1c 11.3.   Home med: metformin 500 mg bid (has not taken dose yet).     Plan:   - endo consulted, appreciate recs  - c/w lantus 15 units at bedtime  - c/w 5 units lispro premeal  - c/w low iSS  - f/u c-peptide, insulin antibody  - monitor FS Pt with newly diagnosed diabetes, found to have glucose of 657 on outpatient labs. Now s/p 2L NS and 6 units insulin with glucose 446 -> 301. UA w/o ketones, BHB 0.5 without acidosis or AG, not in DKA. Found to have A1c 11.3.   Home med: metformin 500 mg bid (has not taken dose yet).     Plan:   - endo consulted, appreciate recs  - diabetes educator consulted  - c/w lantus 15 units at bedtime  - c/w 5 units lispro premeal  - c/w low iSS  - f/u c-peptide, insulin antibody  - monitor FS

## 2023-08-30 NOTE — DIETITIAN INITIAL EVALUATION ADULT - ADD RECOMMEND
1. Continue Consistent Carbohydrate (snack) diet as ordered. Continue micronutrient supplementation per team.   >>Encourage & monitor PO intake. [place] dietary preferences as able.   2. Monitor GI tolerance, weight trends, labs, & skin integrity.  3. Defer bowel and pain regimens to team.   4. RD to remain available for diet education/intervention prn.   >>Recommend consult CDCES in setting of uncontrolled DM.

## 2023-08-30 NOTE — DIETITIAN INITIAL EVALUATION ADULT - PERTINENT LABORATORY DATA
08-30    132<L>  |  105  |  9   ----------------------------<  276<H>  3.9   |  19<L>  |  0.46<L>    Ca    9.2      30 Aug 2023 12:05  Phos  4.7     08-30  Mg     1.8     08-30    TPro  7.8  /  Alb  3.6  /  TBili  0.9  /  DBili  x   /  AST  22  /  ALT  12  /  AlkPhos  114  08-30  POCT Blood Glucose.: 253 mg/dL (08-30-23 @ 11:58)  A1C with Estimated Average Glucose Result: 11.3 % (08-29-23 @ 12:18)

## 2023-08-30 NOTE — PROGRESS NOTE ADULT - PROBLEM SELECTOR PLAN 4
Pt presents with tenderness to palpation of his left lower quadrant. Patient however denies any hematochezia, constipation, and diarrhea    - consider diverticulosis vs diverticulitis imaging CT abdomen and pelvis w/ contrast Pt presents with tenderness to palpation of his left lower quadrant. Patient however denies any hematochezia, constipation, and diarrhea    - consider diverticulosis vs diverticulitis with optional imaging of CT abdomen and pelvis w/ contrast Pt presents with tenderness to palpation of his left lower quadrant. Patient however denies any hematochezia, constipation, and diarrhea. Howver, fhx of colon cancer where mom was diagnosed in 60s.     - follow-up outpatient: consider diverticulosis vs diverticulitis vs malignancy with optional imaging of CT abdomen and pelvis w/ contrast

## 2023-08-30 NOTE — PROGRESS NOTE ADULT - PROBLEM SELECTOR PLAN 3
Pt diagnosed with TB on 06/29 at [facility] on [facility] therapy with improvement of symptoms. Reports improving cough, denies hemoptysis or fever/chills. CXR w/ b/l consolidation and possible cavity in L midlung c/w hx of TB. Last positive sputum culture (07/13), multiple negative cultures.   Home meds: rifampin 600 mg bid, isoniazid 300 mg q24h, pyrazinamide 1500 mg q24h, vitamin B6    Plan:  -c/w home meds  - f/u AFB sputum culture Pt diagnosed with TB on 06/29 at [facility] on R[facility] therapy with improvement of symptoms. Reports improving cough, denies hemoptysis or fever/chills. CXR w/ b/l consolidation and possible cavity in L midlung c/w hx of TB. Last positive sputum culture (07/13), multiple negative cultures.   Home meds: rifampin 600 mg bid, isoniazid 300 mg q24h, pyrazinamide 1500 mg q24h, vitamin B6    Plan:  -c/w home meds  - f/u AFB sputum culture  - increased B6 25mg to 50mg

## 2023-08-30 NOTE — PROGRESS NOTE ADULT - PROBLEM SELECTOR PLAN 6
F: s/p 2L NS  E: Replete Mg <2 and K <4   N: Consistent carb  DVT ppx: SCDs  GI ppx: none    Dispo: [facility] F: s/p 2L NS, tolerating PO  E: Replete Mg <2 and K <4   N: Consistent carb  DVT ppx: SCDs  GI ppx: none    Dispo: [facility] then home

## 2023-08-30 NOTE — DIETITIAN INITIAL EVALUATION ADULT - OTHER CALCULATIONS
Estimated needs based on ABW as unable to calculate IBW at this time. Needs adjusted for age and clinical status.

## 2023-08-30 NOTE — DIETITIAN INITIAL EVALUATION ADULT - EDUCATION DIETARY MODIFICATIONS
Education deferred as pt not engaged in assessment. RD to reattempt on follow-up as able./not applicable

## 2023-08-30 NOTE — DIETITIAN INITIAL EVALUATION ADULT - OTHER INFO
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 Pt s/p 2L NS and 6 units insulin with downtrending glucose. Admitted for management of hyperglycemia and optimization of diabetes regimen.     On assessment, pt resting in bed. Labs and medication orders reviewed. Na 132 <L>, BUN/Cr 8 WNL/0.46 <L>, phosphorus 4.7 <H>, POC -170 (8/29-8/30), HgbA1c 11.3% (8/29). Ordered for lantus, premeal admelog, vit B-6. On Consistent Carbohydrate (snack) diet. Pt prefers Somali,  used (#[id number]). Pt reports good appetite and intake. No overt signs of wasting observed. Unable to obtain diet/anthropometric history or provide education as pt fell asleep/refused further engagement in assessment. Provided handout in Somali on nutrition for DM at bedside. RD to reattempt on follow-up as able. See nutrition recommendations. RD to follow-up per protocol.

## 2023-08-30 NOTE — PROGRESS NOTE ADULT - SUBJECTIVE AND OBJECTIVE BOX
OVERNIGHT EVENTS:    SUBJECTIVE / INTERVAL HPI: Patient seen and examined at bedside.       PHYSICAL EXAM: ***INCOMPLETE    General: NAD  HEENT: PERRL, anicteric sclera; MMM  Neck: supple  Cardiovascular: +S1/S2, RRR  Respiratory: CTA B/L; normal wob  Gastrointestinal: soft, NT/ND; +BSx4  Extremities: WWP; no edema, clubbing or cyanosis  Vascular: 2+ radial, DP/PT pulses B/L  Neurological: AAOx3; no focal deficits  Psychiatric: pleasant mood and affect  Dermatologic: no appreciable wounds or damage to the skin    VITAL SIGNS:  Vital Signs Last 24 Hrs  T(C): 36.5 (30 Aug 2023 05:48), Max: 37.2 (30 Aug 2023 01:30)  T(F): 97.7 (30 Aug 2023 05:48), Max: 99 (30 Aug 2023 01:30)  HR: 84 (30 Aug 2023 05:48) (84 - 111)  BP: 103/65 (30 Aug 2023 05:48) (101/59 - 125/76)  BP(mean): 78 (30 Aug 2023 05:48) (78 - 92)  RR: 16 (30 Aug 2023 05:48) (16 - 18)  SpO2: 100% (30 Aug 2023 05:48) (97% - 100%)    Parameters below as of 30 Aug 2023 05:48  Patient On (Oxygen Delivery Method): room air          MEDICATIONS:  MEDICATIONS  (STANDING):  dextrose 5%. 1000 milliLiter(s) (100 mL/Hr) IV Continuous <Continuous>  dextrose 5%. 1000 milliLiter(s) (50 mL/Hr) IV Continuous <Continuous>  dextrose 50% Injectable 25 Gram(s) IV Push once  dextrose 50% Injectable 12.5 Gram(s) IV Push once  dextrose 50% Injectable 25 Gram(s) IV Push once  glucagon  Injectable 1 milliGRAM(s) IntraMuscular once  insulin glargine Injectable (LANTUS) 15 Unit(s) SubCutaneous at bedtime  insulin lispro (ADMELOG) corrective regimen sliding scale   SubCutaneous three times a day before meals  insulin lispro (ADMELOG) corrective regimen sliding scale   SubCutaneous at bedtime  insulin lispro Injectable (ADMELOG) 5 Unit(s) SubCutaneous three times a day before meals  isoniazid 300 milliGRAM(s) Oral every 24 hours  pyrazinamide 1500 milliGRAM(s) Oral every 24 hours  pyridoxine 25 milliGRAM(s) Oral every 24 hours  rifAMPin 600 milliGRAM(s) Oral every 24 hours    MEDICATIONS  (PRN):  acetaminophen     Tablet .. 1000 milliGRAM(s) Oral every 6 hours PRN Temp greater or equal to 38C (100.4F), Mild Pain (1 - 3)  aluminum hydroxide/magnesium hydroxide/simethicone Suspension 30 milliLiter(s) Oral every 4 hours PRN Dyspepsia  dextrose Oral Gel 15 Gram(s) Oral once PRN Blood Glucose LESS THAN 70 milliGRAM(s)/deciliter  melatonin 5 milliGRAM(s) Oral at bedtime PRN Insomnia  ondansetron Injectable 4 milliGRAM(s) IV Push every 8 hours PRN Nausea and/or Vomiting      ALLERGIES:  Allergies    No Known Allergies    Intolerances        LABS:                        7.7    5.31  )-----------( 314      ( 30 Aug 2023 02:57 )             25.4         132<L>  |  100  |  14  ----------------------------<  447<H>  4.4   |  21<L>  |  0.61    Ca    9.9      29 Aug 2023 12:18  Mg     2.0         TPro  8.6<H>  /  Alb  3.8  /  TBili  0.6  /  DBili  x   /  AST  20  /  ALT  13  /  AlkPhos  153<H>      PT/INR - ( 29 Aug 2023 12:18 )   PT: 12.8 sec;   INR: 1.13          PTT - ( 29 Aug 2023 12:18 )  PTT:36.4 sec  Urinalysis Basic - ( 29 Aug 2023 12:18 )    Color: Yellow / Appearance: Clear / S.010 / pH: x  Gluc: 447 mg/dL / Ketone: NEGATIVE  / Bili: Negative / Urobili: 0.2 E.U./dL   Blood: x / Protein: NEGATIVE mg/dL / Nitrite: NEGATIVE   Leuk Esterase: NEGATIVE / RBC: x / WBC x   Sq Epi: x / Non Sq Epi: x / Bacteria: x      CAPILLARY BLOOD GLUCOSE      POCT Blood Glucose.: 369 mg/dL (29 Aug 2023 23:52)      RADIOLOGY & ADDITIONAL TESTS: Reviewed. OVERNIGHT EVENTS: Patient was noted to be anemic to Hgb of 6.5 and received 1 unit of pRBCs resulting in an increase of Hgb to 7.7    SUBJECTIVE / INTERVAL HPI: Patient seen and examined at bedside.       PHYSICAL EXAM: ***INCOMPLETE    General: NAD  HEENT: PERRL, anicteric sclera; MMM  Neck: supple  Cardiovascular: +S1/S2, RRR  Respiratory: CTA B/L; normal wob  Gastrointestinal: soft; tenderness to palpation noted in LLQ/ND; +BSx4  Extremities: WWP; no edema, clubbing or cyanosis  Vascular: 2+ radial, DP/PT pulses B/L  Neurological: AAOx3; no focal deficits  Psychiatric: pleasant mood and affect  Dermatologic: no appreciable wounds or damage to the skin    VITAL SIGNS:  Vital Signs Last 24 Hrs  T(C): 36.5 (30 Aug 2023 05:48), Max: 37.2 (30 Aug 2023 01:30)  T(F): 97.7 (30 Aug 2023 05:48), Max: 99 (30 Aug 2023 01:30)  HR: 84 (30 Aug 2023 05:48) (84 - 111)  BP: 103/65 (30 Aug 2023 05:48) (101/59 - 125/76)  BP(mean): 78 (30 Aug 2023 05:48) (78 - 92)  RR: 16 (30 Aug 2023 05:48) (16 - 18)  SpO2: 100% (30 Aug 2023 05:48) (97% - 100%)    Parameters below as of 30 Aug 2023 05:48  Patient On (Oxygen Delivery Method): room air          MEDICATIONS:  MEDICATIONS  (STANDING):  dextrose 5%. 1000 milliLiter(s) (100 mL/Hr) IV Continuous <Continuous>  dextrose 5%. 1000 milliLiter(s) (50 mL/Hr) IV Continuous <Continuous>  dextrose 50% Injectable 25 Gram(s) IV Push once  dextrose 50% Injectable 12.5 Gram(s) IV Push once  dextrose 50% Injectable 25 Gram(s) IV Push once  glucagon  Injectable 1 milliGRAM(s) IntraMuscular once  insulin glargine Injectable (LANTUS) 15 Unit(s) SubCutaneous at bedtime  insulin lispro (ADMELOG) corrective regimen sliding scale   SubCutaneous three times a day before meals  insulin lispro (ADMELOG) corrective regimen sliding scale   SubCutaneous at bedtime  insulin lispro Injectable (ADMELOG) 5 Unit(s) SubCutaneous three times a day before meals  isoniazid 300 milliGRAM(s) Oral every 24 hours  pyrazinamide 1500 milliGRAM(s) Oral every 24 hours  pyridoxine 25 milliGRAM(s) Oral every 24 hours  rifAMPin 600 milliGRAM(s) Oral every 24 hours    MEDICATIONS  (PRN):  acetaminophen     Tablet .. 1000 milliGRAM(s) Oral every 6 hours PRN Temp greater or equal to 38C (100.4F), Mild Pain (1 - 3)  aluminum hydroxide/magnesium hydroxide/simethicone Suspension 30 milliLiter(s) Oral every 4 hours PRN Dyspepsia  dextrose Oral Gel 15 Gram(s) Oral once PRN Blood Glucose LESS THAN 70 milliGRAM(s)/deciliter  melatonin 5 milliGRAM(s) Oral at bedtime PRN Insomnia  ondansetron Injectable 4 milliGRAM(s) IV Push every 8 hours PRN Nausea and/or Vomiting      ALLERGIES:  Allergies    No Known Allergies    Intolerances        LABS:                        7.7    5.31  )-----------( 314      ( 30 Aug 2023 02:57 )             25.4         132<L>  |  100  |  14  ----------------------------<  447<H>  4.4   |  21<L>  |  0.61    Ca    9.9      29 Aug 2023 12:18  Mg     2.0         TPro  8.6<H>  /  Alb  3.8  /  TBili  0.6  /  DBili  x   /  AST  20  /  ALT  13  /  AlkPhos  153<H>      PT/INR - ( 29 Aug 2023 12:18 )   PT: 12.8 sec;   INR: 1.13          PTT - ( 29 Aug 2023 12:18 )  PTT:36.4 sec  Urinalysis Basic - ( 29 Aug 2023 12:18 )    Color: Yellow / Appearance: Clear / S.010 / pH: x  Gluc: 447 mg/dL / Ketone: NEGATIVE  / Bili: Negative / Urobili: 0.2 E.U./dL   Blood: x / Protein: NEGATIVE mg/dL / Nitrite: NEGATIVE   Leuk Esterase: NEGATIVE / RBC: x / WBC x   Sq Epi: x / Non Sq Epi: x / Bacteria: x      CAPILLARY BLOOD GLUCOSE      POCT Blood Glucose.: 369 mg/dL (29 Aug 2023 23:52)      RADIOLOGY & ADDITIONAL TESTS: Reviewed. OVERNIGHT EVENTS: Patient was noted to be anemic to Hgb of 6.5 and received 1 unit of pRBCs resulting in an increase of Hgb to 7.7. Currently s/p 2 L NS and 6 units insulin.    SUBJECTIVE / INTERVAL HPI: Patient seen and examined at bedside this morning. Patient was well-appearing and in no distress. Patient complained of an intermittent dull and ache-y LLQ pain, rated 7/10 as well as bilateral foot/ankle pain, rated 5/10 and characterized as tingling, "pins and needles pain." Patient denied any changes in appetite, constipation, diarrhea, nausea, vomiting and had one bowel movement last night. Patient denies any fever/chills, runny nose, hemoptysis, night sweats, and weight loss.      PHYSICAL EXAM: ***INCOMPLETE    General: NAD  HEENT: PERRL, anicteric sclera; MMM  Neck: supple  Cardiovascular: +S1/S2, RRR  Respiratory: CTA B/L; normal wob  Gastrointestinal: soft; tenderness to palpation noted in LLQ/ND; +BSx4  Extremities: WWP; no edema, clubbing or cyanosis  Vascular: 2+ radial, DP/PT pulses B/L  Neurological: AAOx3; no focal deficits  Psychiatric: pleasant mood and affect  Dermatologic: no appreciable wounds or damage to the skin    VITAL SIGNS:  Vital Signs Last 24 Hrs  T(C): 36.5 (30 Aug 2023 05:48), Max: 37.2 (30 Aug 2023 01:30)  T(F): 97.7 (30 Aug 2023 05:48), Max: 99 (30 Aug 2023 01:30)  HR: 84 (30 Aug 2023 05:48) (84 - 111)  BP: 103/65 (30 Aug 2023 05:48) (101/59 - 125/76)  BP(mean): 78 (30 Aug 2023 05:48) (78 - 92)  RR: 16 (30 Aug 2023 05:48) (16 - 18)  SpO2: 100% (30 Aug 2023 05:48) (97% - 100%)    Parameters below as of 30 Aug 2023 05:48  Patient On (Oxygen Delivery Method): room air          MEDICATIONS:  MEDICATIONS  (STANDING):  dextrose 5%. 1000 milliLiter(s) (100 mL/Hr) IV Continuous <Continuous>  dextrose 5%. 1000 milliLiter(s) (50 mL/Hr) IV Continuous <Continuous>  dextrose 50% Injectable 25 Gram(s) IV Push once  dextrose 50% Injectable 12.5 Gram(s) IV Push once  dextrose 50% Injectable 25 Gram(s) IV Push once  glucagon  Injectable 1 milliGRAM(s) IntraMuscular once  insulin glargine Injectable (LANTUS) 15 Unit(s) SubCutaneous at bedtime  insulin lispro (ADMELOG) corrective regimen sliding scale   SubCutaneous three times a day before meals  insulin lispro (ADMELOG) corrective regimen sliding scale   SubCutaneous at bedtime  insulin lispro Injectable (ADMELOG) 5 Unit(s) SubCutaneous three times a day before meals  isoniazid 300 milliGRAM(s) Oral every 24 hours  pyrazinamide 1500 milliGRAM(s) Oral every 24 hours  pyridoxine 25 milliGRAM(s) Oral every 24 hours  rifAMPin 600 milliGRAM(s) Oral every 24 hours    MEDICATIONS  (PRN):  acetaminophen     Tablet .. 1000 milliGRAM(s) Oral every 6 hours PRN Temp greater or equal to 38C (100.4F), Mild Pain (1 - 3)  aluminum hydroxide/magnesium hydroxide/simethicone Suspension 30 milliLiter(s) Oral every 4 hours PRN Dyspepsia  dextrose Oral Gel 15 Gram(s) Oral once PRN Blood Glucose LESS THAN 70 milliGRAM(s)/deciliter  melatonin 5 milliGRAM(s) Oral at bedtime PRN Insomnia  ondansetron Injectable 4 milliGRAM(s) IV Push every 8 hours PRN Nausea and/or Vomiting      ALLERGIES:  Allergies    No Known Allergies    Intolerances        LABS:                        7.7    5.31  )-----------( 314      ( 30 Aug 2023 02:57 )             25.4         132<L>  |  100  |  14  ----------------------------<  447<H>  4.4   |  21<L>  |  0.61    Ca    9.9      29 Aug 2023 12:18  Mg     2.0         TPro  8.6<H>  /  Alb  3.8  /  TBili  0.6  /  DBili  x   /  AST  20  /  ALT  13  /  AlkPhos  153<H>      PT/INR - ( 29 Aug 2023 12:18 )   PT: 12.8 sec;   INR: 1.13          PTT - ( 29 Aug 2023 12:18 )  PTT:36.4 sec  Urinalysis Basic - ( 29 Aug 2023 12:18 )    Color: Yellow / Appearance: Clear / S.010 / pH: x  Gluc: 447 mg/dL / Ketone: NEGATIVE  / Bili: Negative / Urobili: 0.2 E.U./dL   Blood: x / Protein: NEGATIVE mg/dL / Nitrite: NEGATIVE   Leuk Esterase: NEGATIVE / RBC: x / WBC x   Sq Epi: x / Non Sq Epi: x / Bacteria: x      CAPILLARY BLOOD GLUCOSE      POCT Blood Glucose.: 369 mg/dL (29 Aug 2023 23:52)      RADIOLOGY & ADDITIONAL TESTS: Reviewed. OVERNIGHT EVENTS: Patient was noted to be anemic to Hgb of 6.5 and received 1 unit of pRBCs resulting in an increase of Hgb to 7.7. Currently s/p 2 L NS and 6 units insulin.    SUBJECTIVE / INTERVAL HPI: Patient seen and examined at bedside this morning. Patient was well-appearing and in no distress. Patient complained of an intermittent dull and ache-y LLQ pain, rated 7/10 as well as bilateral foot/ankle pain, rated 5/10 and characterized as tingling, "pins and needles pain." Patient denied any changes in appetite, constipation, diarrhea, nausea, vomiting and had one bowel movement last night. Patient denies any fever/chills, runny nose, hemoptysis, night sweats, and weight loss.      PHYSICAL EXAM:     General: NAD  HEENT: PERRL, anicteric sclera; MMM  Neck: supple  Cardiovascular: +S1/S2, RRR  Respiratory: CTA B/L; normal wob  Gastrointestinal: soft; tenderness to palpation noted in LLQ/ND; +BSx4  Extremities: WWP; no edema, clubbing or cyanosis  Vascular: 2+ radial, DP/PT pulses B/L  Neurological: AAOx3; no focal deficits  Psychiatric: pleasant mood and affect  Dermatologic: no appreciable wounds or damage to the skin    VITAL SIGNS:  Vital Signs Last 24 Hrs  T(C): 36.5 (30 Aug 2023 05:48), Max: 37.2 (30 Aug 2023 01:30)  T(F): 97.7 (30 Aug 2023 05:48), Max: 99 (30 Aug 2023 01:30)  HR: 84 (30 Aug 2023 05:48) (84 - 111)  BP: 103/65 (30 Aug 2023 05:48) (101/59 - 125/76)  BP(mean): 78 (30 Aug 2023 05:48) (78 - 92)  RR: 16 (30 Aug 2023 05:48) (16 - 18)  SpO2: 100% (30 Aug 2023 05:48) (97% - 100%)    Parameters below as of 30 Aug 2023 05:48  Patient On (Oxygen Delivery Method): room air          MEDICATIONS:  MEDICATIONS  (STANDING):  dextrose 5%. 1000 milliLiter(s) (100 mL/Hr) IV Continuous <Continuous>  dextrose 5%. 1000 milliLiter(s) (50 mL/Hr) IV Continuous <Continuous>  dextrose 50% Injectable 25 Gram(s) IV Push once  dextrose 50% Injectable 12.5 Gram(s) IV Push once  dextrose 50% Injectable 25 Gram(s) IV Push once  glucagon  Injectable 1 milliGRAM(s) IntraMuscular once  insulin glargine Injectable (LANTUS) 15 Unit(s) SubCutaneous at bedtime  insulin lispro (ADMELOG) corrective regimen sliding scale   SubCutaneous three times a day before meals  insulin lispro (ADMELOG) corrective regimen sliding scale   SubCutaneous at bedtime  insulin lispro Injectable (ADMELOG) 5 Unit(s) SubCutaneous three times a day before meals  isoniazid 300 milliGRAM(s) Oral every 24 hours  pyrazinamide 1500 milliGRAM(s) Oral every 24 hours  pyridoxine 25 milliGRAM(s) Oral every 24 hours  rifAMPin 600 milliGRAM(s) Oral every 24 hours    MEDICATIONS  (PRN):  acetaminophen     Tablet .. 1000 milliGRAM(s) Oral every 6 hours PRN Temp greater or equal to 38C (100.4F), Mild Pain (1 - 3)  aluminum hydroxide/magnesium hydroxide/simethicone Suspension 30 milliLiter(s) Oral every 4 hours PRN Dyspepsia  dextrose Oral Gel 15 Gram(s) Oral once PRN Blood Glucose LESS THAN 70 milliGRAM(s)/deciliter  melatonin 5 milliGRAM(s) Oral at bedtime PRN Insomnia  ondansetron Injectable 4 milliGRAM(s) IV Push every 8 hours PRN Nausea and/or Vomiting      ALLERGIES:  Allergies    No Known Allergies    Intolerances        LABS:                        7.7    5.31  )-----------( 314      ( 30 Aug 2023 02:57 )             25.4         132<L>  |  100  |  14  ----------------------------<  447<H>  4.4   |  21<L>  |  0.61    Ca    9.9      29 Aug 2023 12:18  Mg     2.0         TPro  8.6<H>  /  Alb  3.8  /  TBili  0.6  /  DBili  x   /  AST  20  /  ALT  13  /  AlkPhos  153<H>      PT/INR - ( 29 Aug 2023 12:18 )   PT: 12.8 sec;   INR: 1.13          PTT - ( 29 Aug 2023 12:18 )  PTT:36.4 sec  Urinalysis Basic - ( 29 Aug 2023 12:18 )    Color: Yellow / Appearance: Clear / S.010 / pH: x  Gluc: 447 mg/dL / Ketone: NEGATIVE  / Bili: Negative / Urobili: 0.2 E.U./dL   Blood: x / Protein: NEGATIVE mg/dL / Nitrite: NEGATIVE   Leuk Esterase: NEGATIVE / RBC: x / WBC x   Sq Epi: x / Non Sq Epi: x / Bacteria: x      CAPILLARY BLOOD GLUCOSE      POCT Blood Glucose.: 369 mg/dL (29 Aug 2023 23:52)      RADIOLOGY & ADDITIONAL TESTS: Reviewed.

## 2023-08-30 NOTE — CHART NOTE - NSCHARTNOTEFT_GEN_A_CORE
Spoke to Dr. Duque at [facility] who has been taking care of this patient's TB management. 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 Dr. Duque does not think the patient's TB is infectious given continued treatment, and does not think requiring a negative AFB to be a barrier to discharge. He will reach out to our infection prevention/epidemiology program.

## 2023-08-30 NOTE — PROGRESS NOTE ADULT - ATTENDING COMMENTS
Pt seen on rounds this afternoon.  Looked well, PO intake excellent, but glucoses still quite high.  The spike to 484 last night may well have been due to extra food--he had outside food (two small wraps) at his bedside even before the hospital tray arrived.  Glucose then dropped sharply overnight with 15 Lantus/6 lispro---170 this morning  Went back up to 253 at noon.  Exam unchanged.  Has coarse rhonchi left lower/mid lung fields.  Thyroid not enlarged.  No edema.  --With the glucose having dropped markedly overnight with only 6 units of lispro correction, the 15 units of Lantus seems sufficient  --He clearly needs an increase in his premeal lispro--to increase to 10 units  --C-peptide level pending.

## 2023-08-30 NOTE — PROGRESS NOTE ADULT - SUBJECTIVE AND OBJECTIVE BOX
SUBJECTIVE / INTERVAL HPI: Patient was seen and examined this morning.     Overnight events:    Endocrine course  8/29: Lantus 15 + lispro 5 u     CAPILLARY BLOOD GLUCOSE & INSULIN RECEIVED  313 mg/dL (08-29 @ 17:39) - lispro 5 + lispro 1 hubert   484 mg/dL (08-29 @ 22:00) - lantus 15 + 6u lispro  369 mg/dL (08-29 @ 23:52)  170 mg/dL (08-30 @ 08:42) - 1 u lispro + lispro 5      REVIEW OF SYSTEMS  Constitutional:  Negative fever, chills or loss of appetite.  Eyes:  Negative blurry vision or double vision.  Cardiovascular:  Negative for chest pain or palpitations.  Respiratory:  Negative for cough, wheezing, or shortness of breath.    Gastrointestinal:  Negative for nausea, vomiting, diarrhea, constipation, or abdominal pain.  Genitourinary:  Negative frequency, urgency or dysuria.  Neurologic:  No headache, confusion, dizziness, lightheadedness.    PHYSICAL EXAM  Vital Signs Last 24 Hrs  T(C): 36.5 (30 Aug 2023 05:48), Max: 37.2 (30 Aug 2023 01:30)  T(F): 97.7 (30 Aug 2023 05:48), Max: 99 (30 Aug 2023 01:30)  HR: 84 (30 Aug 2023 05:48) (84 - 111)  BP: 103/65 (30 Aug 2023 05:48) (101/59 - 125/76)  BP(mean): 78 (30 Aug 2023 05:48) (78 - 92)  RR: 16 (30 Aug 2023 05:48) (16 - 18)  SpO2: 100% (30 Aug 2023 05:48) (97% - 100%)    Parameters below as of 30 Aug 2023 05:48  Patient On (Oxygen Delivery Method): room air        Constitutional: Awake, alert, in no acute distress.   HEENT: Normocephalic, atraumatic, CHIKIS.  Respiratory: Lungs clear to ausculation bilaterally.   Cardiovascular: regular rhythm, normal S1 and S2, no audible murmurs.   GI: soft, non-tender, non-distended, bowel sounds present.  Extremities: No lower extremity edema.  Psychiatric: AAO x 3. Normal affect/mood.     LABS  CBC - WBC/HGB/HTC/PLT: 5.31/7.7/25.4/314 (08-30-23)  BMP - Na/K/Cl/Bicarb/BUN/Cr/Gluc/AG/eGFR: 132/4.4/100/21/14/0.61/447/11/119 (08-29-23)  Ca - 9.9 (08-29-23)  Phos - -- (08-29-23)  Mg - 2.0 (08-29-23)  LFT - Alb/Tprot/Tbili/Dbili/AlkPhos/ALT/AST: 3.8/--/0.6/--/153/13/20 (08-29-23)  PT/aPTT/INR: 12.8/36.4/1.13 (08-29-23)   Thyroid Stimulating Hormone, Serum: 2.220 (08-30-23)        08-29-23 @ 07:01  -  08-30-23 @ 07:00  --------------------------------------------------------  IN: 300 mL / OUT: 450 mL / NET: -150 mL        MEDICATIONS  MEDICATIONS  (STANDING):  dextrose 5%. 1000 milliLiter(s) (100 mL/Hr) IV Continuous <Continuous>  dextrose 5%. 1000 milliLiter(s) (50 mL/Hr) IV Continuous <Continuous>  dextrose 50% Injectable 25 Gram(s) IV Push once  dextrose 50% Injectable 12.5 Gram(s) IV Push once  dextrose 50% Injectable 25 Gram(s) IV Push once  glucagon  Injectable 1 milliGRAM(s) IntraMuscular once  insulin glargine Injectable (LANTUS) 15 Unit(s) SubCutaneous at bedtime  insulin lispro (ADMELOG) corrective regimen sliding scale   SubCutaneous three times a day before meals  insulin lispro (ADMELOG) corrective regimen sliding scale   SubCutaneous at bedtime  insulin lispro Injectable (ADMELOG) 5 Unit(s) SubCutaneous three times a day before meals  isoniazid 300 milliGRAM(s) Oral every 24 hours  pyrazinamide 1500 milliGRAM(s) Oral every 24 hours  pyridoxine 25 milliGRAM(s) Oral every 24 hours  rifAMPin 600 milliGRAM(s) Oral every 24 hours    MEDICATIONS  (PRN):  acetaminophen     Tablet .. 1000 milliGRAM(s) Oral every 6 hours PRN Temp greater or equal to 38C (100.4F), Mild Pain (1 - 3)  aluminum hydroxide/magnesium hydroxide/simethicone Suspension 30 milliLiter(s) Oral every 4 hours PRN Dyspepsia  dextrose Oral Gel 15 Gram(s) Oral once PRN Blood Glucose LESS THAN 70 milliGRAM(s)/deciliter  melatonin 5 milliGRAM(s) Oral at bedtime PRN Insomnia  ondansetron Injectable 4 milliGRAM(s) IV Push every 8 hours PRN Nausea and/or Vomiting    ASSESSMENT / RECOMMENDATIONS    48y Male with a past medical history of etoh use disorder, recent diagnosis of pulm TB (on INH/Rifamin/PZA started on 6/29) and recent diagnosis of DM, was sent to hospital by his PMD for glucose > 600.  Pt endorses polydipsia and polyuria and recently diagnosed with DM outpatient less than one week ago.  Endocrinology has been consulted for Diabetes Management.    A1C: 11.3 %  BUN: 14  Creatinine: 0.61  GFR: 119  Weight: 59  BMI:     # Type 2 diabetes mellitus with hyperglycemia  - Please continue lantus  units at bedtime.   - Continue lispro  units before each meal.  - Continue lispro moderate / low dose sliding scale before meals and at bedtime.  - Patient's fingerstick glucose goal is 100-180 mg/dL.    - Discharge recommendations to be discussed.   - Patient can follow up at discharge with [facility] by calling [phone] to make an appointment.      Case discussed with Dr. Sun. Primary team updated.       Jordana Bell  Endocrinology Fellow    Service Pager: [phone]    SUBJECTIVE / INTERVAL HPI: Patient was seen and examined this morning.     Overnight events:  pt reports feeling well  pt had salmon and brocolli for dinner  pt had coffee and forgot other things he had for breakfast  he had chicken, mashed potatoes, cheesecake, chicken soup for lunch    Endocrine course  8/29: Lantus 15 + lispro 5 u     CAPILLARY BLOOD GLUCOSE & INSULIN RECEIVED  313 mg/dL (08-29 @ 17:39) - lispro 5 + lispro 1 hubert   484 mg/dL (08-29 @ 22:00) - lantus 15 + 6u lispro  369 mg/dL (08-29 @ 23:52)  170 mg/dL (08-30 @ 08:42) - 1 u lispro hubert + lispro 5  253 for 12 - 4 u lispro hubert + lispro 5      REVIEW OF SYSTEMS  Constitutional:  Negative fever, chills or loss of appetite.  Eyes:  Negative blurry vision or double vision.  Cardiovascular:  Negative for chest pain or palpitations.  Respiratory:  Negative for cough, wheezing, or shortness of breath.    Gastrointestinal:  Negative for nausea, vomiting, diarrhea, constipation, or abdominal pain.  Genitourinary:  Negative frequency, urgency or dysuria.  Neurologic:  No headache, confusion, dizziness, lightheadedness.    PHYSICAL EXAM  Vital Signs Last 24 Hrs  T(C): 36.5 (30 Aug 2023 05:48), Max: 37.2 (30 Aug 2023 01:30)  T(F): 97.7 (30 Aug 2023 05:48), Max: 99 (30 Aug 2023 01:30)  HR: 84 (30 Aug 2023 05:48) (84 - 111)  BP: 103/65 (30 Aug 2023 05:48) (101/59 - 125/76)  BP(mean): 78 (30 Aug 2023 05:48) (78 - 92)  RR: 16 (30 Aug 2023 05:48) (16 - 18)  SpO2: 100% (30 Aug 2023 05:48) (97% - 100%)    Parameters below as of 30 Aug 2023 05:48  Patient On (Oxygen Delivery Method): room air        Constitutional: Awake, alert, in no acute distress.   HEENT: Normocephalic, atraumatic, CHIKIS.  Respiratory: Lungs clear to ausculation bilaterally.   Cardiovascular: regular rhythm, normal S1 and S2, no audible murmurs.   GI: soft, non-tender, non-distended, bowel sounds present.  Extremities: No lower extremity edema.      LABS  CBC - WBC/HGB/HTC/PLT: 5.31/7.7/25.4/314 (08-30-23)  BMP - Na/K/Cl/Bicarb/BUN/Cr/Gluc/AG/eGFR: 132/4.4/100/21/14/0.61/447/11/119 (08-29-23)  Ca - 9.9 (08-29-23)  Phos - -- (08-29-23)  Mg - 2.0 (08-29-23)  LFT - Alb/Tprot/Tbili/Dbili/AlkPhos/ALT/AST: 3.8/--/0.6/--/153/13/20 (08-29-23)  PT/aPTT/INR: 12.8/36.4/1.13 (08-29-23)   Thyroid Stimulating Hormone, Serum: 2.220 (08-30-23)        08-29-23 @ 07:01  -  08-30-23 @ 07:00  --------------------------------------------------------  IN: 300 mL / OUT: 450 mL / NET: -150 mL        MEDICATIONS  MEDICATIONS  (STANDING):  dextrose 5%. 1000 milliLiter(s) (100 mL/Hr) IV Continuous <Continuous>  dextrose 5%. 1000 milliLiter(s) (50 mL/Hr) IV Continuous <Continuous>  dextrose 50% Injectable 25 Gram(s) IV Push once  dextrose 50% Injectable 12.5 Gram(s) IV Push once  dextrose 50% Injectable 25 Gram(s) IV Push once  glucagon  Injectable 1 milliGRAM(s) IntraMuscular once  insulin glargine Injectable (LANTUS) 15 Unit(s) SubCutaneous at bedtime  insulin lispro (ADMELOG) corrective regimen sliding scale   SubCutaneous three times a day before meals  insulin lispro (ADMELOG) corrective regimen sliding scale   SubCutaneous at bedtime  insulin lispro Injectable (ADMELOG) 5 Unit(s) SubCutaneous three times a day before meals  isoniazid 300 milliGRAM(s) Oral every 24 hours  pyrazinamide 1500 milliGRAM(s) Oral every 24 hours  pyridoxine 25 milliGRAM(s) Oral every 24 hours  rifAMPin 600 milliGRAM(s) Oral every 24 hours    MEDICATIONS  (PRN):  acetaminophen     Tablet .. 1000 milliGRAM(s) Oral every 6 hours PRN Temp greater or equal to 38C (100.4F), Mild Pain (1 - 3)  aluminum hydroxide/magnesium hydroxide/simethicone Suspension 30 milliLiter(s) Oral every 4 hours PRN Dyspepsia  dextrose Oral Gel 15 Gram(s) Oral once PRN Blood Glucose LESS THAN 70 milliGRAM(s)/deciliter  melatonin 5 milliGRAM(s) Oral at bedtime PRN Insomnia  ondansetron Injectable 4 milliGRAM(s) IV Push every 8 hours PRN Nausea and/or Vomiting    ASSESSMENT / RECOMMENDATIONS    48y Male with a past medical history of etoh use disorder, recent diagnosis of pulm TB (on INH/Rifamin/PZA started on 6/29) and recent diagnosis of DM, was sent to hospital by his PMD for glucose > 600.  Pt endorses polydipsia and polyuria and recently diagnosed with DM outpatient less than one week ago.  Endocrinology has been consulted for Diabetes Management.    A1C: 11.3 %  BUN: 14  Creatinine: 0.61  GFR: 119  Weight: 59  BMI:     # Type 2 diabetes mellitus with hyperglycemia  - Please continue lantus  units at bedtime.   - Continue lispro  units before each meal.  - please change to lispro moderate dose sliding scale before meals and at bedtime.  - Patient's fingerstick glucose goal is 100-180 mg/dL.    - Discharge recommendations to be discussed.   - Patient can follow up at discharge with [facility] by calling [phone] to make an appointment.      Case discussed with Dr. Sun. Primary team updated.       Jordana Bell  Endocrinology Fellow    Service Pager: [phone]    SUBJECTIVE / INTERVAL HPI: Patient was seen and examined this morning.     Overnight events:  pt reports feeling well  pt had salmon and brocolli for dinner  pt had coffee and forgot other things he had for breakfast  he had chicken, mashed potatoes, cheesecake, chicken soup for lunch    Endocrine course  8/29: Lantus 15 + lispro 5 u     CAPILLARY BLOOD GLUCOSE & INSULIN RECEIVED  313 mg/dL (08-29 @ 17:39) - lispro 5 + lispro 1 hubert   484 mg/dL (08-29 @ 22:00) - lantus 15 + 6u lispro  369 mg/dL (08-29 @ 23:52)  170 mg/dL (08-30 @ 08:42) - 1 u lispro hubert + lispro 5  253 for 12 - 4 u lispro hubert + lispro 5      REVIEW OF SYSTEMS  Constitutional:  Negative fever, chills or loss of appetite.  Eyes:  Negative blurry vision or double vision.  Cardiovascular:  Negative for chest pain or palpitations.  Respiratory:  Negative for cough, wheezing, or shortness of breath.    Gastrointestinal:  Negative for nausea, vomiting, diarrhea, constipation, or abdominal pain.  Genitourinary:  Negative frequency, urgency or dysuria.  Neurologic:  No headache, confusion, dizziness, lightheadedness.    PHYSICAL EXAM  Vital Signs Last 24 Hrs  T(C): 36.5 (30 Aug 2023 05:48), Max: 37.2 (30 Aug 2023 01:30)  T(F): 97.7 (30 Aug 2023 05:48), Max: 99 (30 Aug 2023 01:30)  HR: 84 (30 Aug 2023 05:48) (84 - 111)  BP: 103/65 (30 Aug 2023 05:48) (101/59 - 125/76)  BP(mean): 78 (30 Aug 2023 05:48) (78 - 92)  RR: 16 (30 Aug 2023 05:48) (16 - 18)  SpO2: 100% (30 Aug 2023 05:48) (97% - 100%)    Parameters below as of 30 Aug 2023 05:48  Patient On (Oxygen Delivery Method): room air        Constitutional: Awake, alert, in no acute distress.   HEENT: Normocephalic, atraumatic, CHIKIS.  Respiratory: Lungs clear to ausculation bilaterally.   Cardiovascular: regular rhythm, normal S1 and S2, no audible murmurs.   GI: soft, non-tender, non-distended, bowel sounds present.  Extremities: No lower extremity edema.      LABS  CBC - WBC/HGB/HTC/PLT: 5.31/7.7/25.4/314 (08-30-23)  BMP - Na/K/Cl/Bicarb/BUN/Cr/Gluc/AG/eGFR: 132/4.4/100/21/14/0.61/447/11/119 (08-29-23)  Ca - 9.9 (08-29-23)  Phos - -- (08-29-23)  Mg - 2.0 (08-29-23)  LFT - Alb/Tprot/Tbili/Dbili/AlkPhos/ALT/AST: 3.8/--/0.6/--/153/13/20 (08-29-23)  PT/aPTT/INR: 12.8/36.4/1.13 (08-29-23)   Thyroid Stimulating Hormone, Serum: 2.220 (08-30-23)        08-29-23 @ 07:01  -  08-30-23 @ 07:00  --------------------------------------------------------  IN: 300 mL / OUT: 450 mL / NET: -150 mL        MEDICATIONS  MEDICATIONS  (STANDING):  dextrose 5%. 1000 milliLiter(s) (100 mL/Hr) IV Continuous <Continuous>  dextrose 5%. 1000 milliLiter(s) (50 mL/Hr) IV Continuous <Continuous>  dextrose 50% Injectable 25 Gram(s) IV Push once  dextrose 50% Injectable 12.5 Gram(s) IV Push once  dextrose 50% Injectable 25 Gram(s) IV Push once  glucagon  Injectable 1 milliGRAM(s) IntraMuscular once  insulin glargine Injectable (LANTUS) 15 Unit(s) SubCutaneous at bedtime  insulin lispro (ADMELOG) corrective regimen sliding scale   SubCutaneous three times a day before meals  insulin lispro (ADMELOG) corrective regimen sliding scale   SubCutaneous at bedtime  insulin lispro Injectable (ADMELOG) 5 Unit(s) SubCutaneous three times a day before meals  isoniazid 300 milliGRAM(s) Oral every 24 hours  pyrazinamide 1500 milliGRAM(s) Oral every 24 hours  pyridoxine 25 milliGRAM(s) Oral every 24 hours  rifAMPin 600 milliGRAM(s) Oral every 24 hours    MEDICATIONS  (PRN):  acetaminophen     Tablet .. 1000 milliGRAM(s) Oral every 6 hours PRN Temp greater or equal to 38C (100.4F), Mild Pain (1 - 3)  aluminum hydroxide/magnesium hydroxide/simethicone Suspension 30 milliLiter(s) Oral every 4 hours PRN Dyspepsia  dextrose Oral Gel 15 Gram(s) Oral once PRN Blood Glucose LESS THAN 70 milliGRAM(s)/deciliter  melatonin 5 milliGRAM(s) Oral at bedtime PRN Insomnia  ondansetron Injectable 4 milliGRAM(s) IV Push every 8 hours PRN Nausea and/or Vomiting    ASSESSMENT / RECOMMENDATIONS    48y Male with a past medical history of etoh use disorder, recent diagnosis of pulm TB (on INH/Rifamin/PZA started on 6/29) and recent diagnosis of DM, was sent to hospital by his PMD for glucose > 600.  Pt endorses polydipsia and polyuria and recently diagnosed with DM outpatient less than one week ago.  Endocrinology has been consulted for Diabetes Management.    A1C: 11.3 %  BUN: 14  Creatinine: 0.61  GFR: 119  Weight: 59  BMI:     # Type 2 diabetes mellitus with hyperglycemia  - Please continue lantus 15  units at bedtime.   - Continue lispro 10 units before each meal.  - please change to lispro moderate dose sliding scale before meals and at bedtime.  - will f/u insulin antibodies and c-peptide  - Patient's fingerstick glucose goal is 100-180 mg/dL.    - Discharge recommendations to be discussed.   - Patient can follow up at discharge with [facility] by calling [phone] to make an appointment.      Case discussed with Dr. Sun. Primary team updated.       Jordana Bell  Endocrinology Fellow    Service Pager: [phone]

## 2023-08-30 NOTE — DIETITIAN INITIAL EVALUATION ADULT - PROBLEM SELECTOR PLAN 3
Pt diagnosed with TB on 06/29 at [facility] on RIPE therapy with improvement of symptoms. Reports improving cough, denies hemoptysis or fever/chills. CXR w/ b/l consolidation and possible cavity in L midlung c/w hx of TB. Last positive sputum culture (07/13), multiple negative cultures.   Home meds: rifampin 600 mg bid, isoniazid 300 mg q24h, pyrazinamide 1500 mg q24h, vitamin B6    Plan:  -c/w home meds  - f/u AFB sputum culture

## 2023-08-30 NOTE — DIETITIAN INITIAL EVALUATION ADULT - PROBLEM SELECTOR PLAN 4
F: s/p 2L NS  E: Replete Mg <2 and K <4   N: Consistent carb  DVT ppx: SCDs  GI ppx: none    Dispo: RMF

## 2023-08-30 NOTE — PROGRESS NOTE ADULT - PROBLEM SELECTOR PLAN 2
Pt presenting with Hgb 7.2, microcytic anemia. No signs of bleeding on exam. Pt with report of hematuria in past few months (UA neg for blood). Denies hematochezia or bleeding.     Plan:   - trend CBC  - f/u iron studies, B12/folate, TSH  - maintain active T+S  - transfuse for Hgb < 7 Pt presenting with Hgb 7.2, microcytic anemia. No signs of bleeding on exam. Pt with report of hematuria in past few months (UA neg for blood). Denies hematochezia or bleeding. Received 1u PRBC with appropriate Hgb response. Ddx: acute blood loss 2/2 hematuria (though no bleeding currently) vs. sideroblastic anemia (known side effect of isoniazid) vs. painless LGIB given ongoing LLQ tenderness (consider diverticulosis?, also Fhx of colon cancer without recent colonoscopy).   - TSH, B12, folate wnl  - iron 35 (low), TIBC 397, however recent pRBC transfusion which may elevate iron levels    Plan:   - trend CBC  - maintain active T+S  - transfuse for Hgb < 7

## 2023-08-30 NOTE — PROGRESS NOTE ADULT - ASSESSMENT
49 yo French-speaking M PMHx of alcohol use disorder (last drink 3 months ago), pulmonary TB (on RIPE, diagnosed 06/29) sent in by PMD for hyperglycemia on outpatient labs i/s/o newly diagnosed diabetes. Pt s/p 2L NS and 6 units insulin with downtrending glucose. Admitted for management of hyperglycemia and optimization of diabetes regimen.  49 yo Bengali-speaking M PMHx of alcohol use disorder (last drink 3 months ago), pulmonary TB (on RIPE, diagnosed 06/29) sent in by PMD for hyperglycemia on outpatient labs i/s/o newly diagnosed diabetes. Pt s/p 2L NS and 6 units insulin with downtrending glucose. Admitted for management of hyperglycemia and optimization of diabetes regimen.

## 2023-08-30 NOTE — PROGRESS NOTE ADULT - PROBLEM SELECTOR PLAN 5
Pt endorses bilateral leg pain extending up to the level of the knee. The pain is equal bilaterally and started soon after taking his tuberculosis medications.    - consider as side effect of TB drugs Pt endorses bilateral leg pain extending up to the level of the knee. The pain is equal bilaterally and started soon after taking his tuberculosis medications.    - consider as side effect of TB drugs   - increase dose of vitamin B6 to 50 mg, oral, daily Pt endorses bilateral leg pain extending up to the level of the knee. The pain is equal bilaterally and started soon after taking his tuberculosis medications. Ddx: consider as side effect of TB drugs vs. neuropathic pain 2/2 uncontrolled DM.    Plan:  - see DM management above  - increase dose of vitamin B6 to 50 mg, oral, daily

## 2023-08-30 NOTE — DIETITIAN INITIAL EVALUATION ADULT - PERTINENT MEDS FT
MEDICATIONS  (STANDING):  dextrose 5%. 1000 milliLiter(s) (100 mL/Hr) IV Continuous <Continuous>  dextrose 5%. 1000 milliLiter(s) (50 mL/Hr) IV Continuous <Continuous>  dextrose 50% Injectable 25 Gram(s) IV Push once  dextrose 50% Injectable 12.5 Gram(s) IV Push once  dextrose 50% Injectable 25 Gram(s) IV Push once  glucagon  Injectable 1 milliGRAM(s) IntraMuscular once  insulin glargine Injectable (LANTUS) 15 Unit(s) SubCutaneous at bedtime  insulin lispro (ADMELOG) corrective regimen sliding scale   SubCutaneous three times a day before meals  insulin lispro (ADMELOG) corrective regimen sliding scale   SubCutaneous at bedtime  insulin lispro Injectable (ADMELOG) 5 Unit(s) SubCutaneous three times a day before meals  isoniazid 300 milliGRAM(s) Oral every 24 hours  pyrazinamide 1500 milliGRAM(s) Oral every 24 hours  pyridoxine 50 milliGRAM(s) Oral daily  rifAMPin 600 milliGRAM(s) Oral every 24 hours    MEDICATIONS  (PRN):  acetaminophen     Tablet .. 1000 milliGRAM(s) Oral every 6 hours PRN Temp greater or equal to 38C (100.4F), Mild Pain (1 - 3)  aluminum hydroxide/magnesium hydroxide/simethicone Suspension 30 milliLiter(s) Oral every 4 hours PRN Dyspepsia  dextrose Oral Gel 15 Gram(s) Oral once PRN Blood Glucose LESS THAN 70 milliGRAM(s)/deciliter  melatonin 5 milliGRAM(s) Oral at bedtime PRN Insomnia  ondansetron Injectable 4 milliGRAM(s) IV Push every 8 hours PRN Nausea and/or Vomiting

## 2023-08-30 NOTE — DIETITIAN INITIAL EVALUATION ADULT - PROBLEM SELECTOR PLAN 1
Pt with newly diagnosed diabetes, found to have glucose of 657 on outpatient labs. Now s/p 2L NS and 6 units insulin with glucose 446 -> 301. UA w/o ketones, BHB 0.5 without acidosis or AG, not in DKA. Found to have A1c 11.3.   Home med: metformin 500 mg bid (has not taken dose yet).     Plan:   - endo consulted, appreciate recs  - c/w lantus 15 units at bedtime  - c/w 5 units lispro premeal  - c/w low iSS  - f/u c-peptide, insulin antibody  - monitor FS

## 2023-08-31 VITALS
SYSTOLIC BLOOD PRESSURE: 105 MMHG | DIASTOLIC BLOOD PRESSURE: 66 MMHG | HEART RATE: 89 BPM | TEMPERATURE: 98 F | RESPIRATION RATE: 18 BRPM | OXYGEN SATURATION: 99 %

## 2023-08-31 LAB
ALBUMIN SERPL ELPH-MCNC: 3.5 G/DL — SIGNIFICANT CHANGE UP (ref 3.3–5)
ALP SERPL-CCNC: 109 U/L — SIGNIFICANT CHANGE UP (ref 40–120)
ALT FLD-CCNC: 14 U/L — SIGNIFICANT CHANGE UP (ref 10–45)
ANION GAP SERPL CALC-SCNC: 9 MMOL/L — SIGNIFICANT CHANGE UP (ref 5–17)
AST SERPL-CCNC: 25 U/L — SIGNIFICANT CHANGE UP (ref 10–40)
BASOPHILS # BLD AUTO: 0.05 K/UL — SIGNIFICANT CHANGE UP (ref 0–0.2)
BASOPHILS NFR BLD AUTO: 0.9 % — SIGNIFICANT CHANGE UP (ref 0–2)
BILIRUB SERPL-MCNC: 0.7 MG/DL — SIGNIFICANT CHANGE UP (ref 0.2–1.2)
BUN SERPL-MCNC: 9 MG/DL — SIGNIFICANT CHANGE UP (ref 7–23)
CALCIUM SERPL-MCNC: 9 MG/DL — SIGNIFICANT CHANGE UP (ref 8.4–10.5)
CHLORIDE SERPL-SCNC: 106 MMOL/L — SIGNIFICANT CHANGE UP (ref 96–108)
CO2 SERPL-SCNC: 21 MMOL/L — LOW (ref 22–31)
CREAT SERPL-MCNC: 0.51 MG/DL — SIGNIFICANT CHANGE UP (ref 0.5–1.3)
EGFR: 126 ML/MIN/1.73M2 — SIGNIFICANT CHANGE UP
EOSINOPHIL # BLD AUTO: 0.16 K/UL — SIGNIFICANT CHANGE UP (ref 0–0.5)
EOSINOPHIL NFR BLD AUTO: 2.9 % — SIGNIFICANT CHANGE UP (ref 0–6)
GLUCOSE BLDC GLUCOMTR-MCNC: 175 MG/DL — HIGH (ref 70–99)
GLUCOSE BLDC GLUCOMTR-MCNC: 184 MG/DL — HIGH (ref 70–99)
GLUCOSE BLDC GLUCOMTR-MCNC: 226 MG/DL — HIGH (ref 70–99)
GLUCOSE SERPL-MCNC: 156 MG/DL — HIGH (ref 70–99)
HCT VFR BLD CALC: 27.4 % — LOW (ref 39–50)
HGB BLD-MCNC: 8.1 G/DL — LOW (ref 13–17)
IMM GRANULOCYTES NFR BLD AUTO: 0.4 % — SIGNIFICANT CHANGE UP (ref 0–0.9)
LYMPHOCYTES # BLD AUTO: 1.69 K/UL — SIGNIFICANT CHANGE UP (ref 1–3.3)
LYMPHOCYTES # BLD AUTO: 31.1 % — SIGNIFICANT CHANGE UP (ref 13–44)
MAGNESIUM SERPL-MCNC: 1.9 MG/DL — SIGNIFICANT CHANGE UP (ref 1.6–2.6)
MCHC RBC-ENTMCNC: 20.8 PG — LOW (ref 27–34)
MCHC RBC-ENTMCNC: 29.6 GM/DL — LOW (ref 32–36)
MCV RBC AUTO: 70.3 FL — LOW (ref 80–100)
MONOCYTES # BLD AUTO: 0.72 K/UL — SIGNIFICANT CHANGE UP (ref 0–0.9)
MONOCYTES NFR BLD AUTO: 13.2 % — SIGNIFICANT CHANGE UP (ref 2–14)
NEUTROPHILS # BLD AUTO: 2.8 K/UL — SIGNIFICANT CHANGE UP (ref 1.8–7.4)
NEUTROPHILS NFR BLD AUTO: 51.5 % — SIGNIFICANT CHANGE UP (ref 43–77)
NIGHT BLUE STAIN TISS: SIGNIFICANT CHANGE UP
NRBC # BLD: 0 /100 WBCS — SIGNIFICANT CHANGE UP (ref 0–0)
PHOSPHATE SERPL-MCNC: 5 MG/DL — HIGH (ref 2.5–4.5)
PLATELET # BLD AUTO: 290 K/UL — SIGNIFICANT CHANGE UP (ref 150–400)
POTASSIUM SERPL-MCNC: 3.9 MMOL/L — SIGNIFICANT CHANGE UP (ref 3.5–5.3)
POTASSIUM SERPL-SCNC: 3.9 MMOL/L — SIGNIFICANT CHANGE UP (ref 3.5–5.3)
PROT SERPL-MCNC: 8 G/DL — SIGNIFICANT CHANGE UP (ref 6–8.3)
RBC # BLD: 3.9 M/UL — LOW (ref 4.2–5.8)
RBC # FLD: 16.8 % — HIGH (ref 10.3–14.5)
SODIUM SERPL-SCNC: 136 MMOL/L — SIGNIFICANT CHANGE UP (ref 135–145)
SPECIMEN SOURCE: SIGNIFICANT CHANGE UP
WBC # BLD: 5.44 K/UL — SIGNIFICANT CHANGE UP (ref 3.8–10.5)
WBC # FLD AUTO: 5.44 K/UL — SIGNIFICANT CHANGE UP (ref 3.8–10.5)

## 2023-08-31 PROCEDURE — 86337 INSULIN ANTIBODIES: CPT

## 2023-08-31 PROCEDURE — 83550 IRON BINDING TEST: CPT

## 2023-08-31 PROCEDURE — 81003 URINALYSIS AUTO W/O SCOPE: CPT

## 2023-08-31 PROCEDURE — 84295 ASSAY OF SERUM SODIUM: CPT

## 2023-08-31 PROCEDURE — 85610 PROTHROMBIN TIME: CPT

## 2023-08-31 PROCEDURE — 85025 COMPLETE CBC W/AUTO DIFF WBC: CPT

## 2023-08-31 PROCEDURE — 83690 ASSAY OF LIPASE: CPT

## 2023-08-31 PROCEDURE — 82330 ASSAY OF CALCIUM: CPT

## 2023-08-31 PROCEDURE — 82746 ASSAY OF FOLIC ACID SERUM: CPT

## 2023-08-31 PROCEDURE — 82010 KETONE BODYS QUAN: CPT

## 2023-08-31 PROCEDURE — 87206 SMEAR FLUORESCENT/ACID STAI: CPT

## 2023-08-31 PROCEDURE — 99285 EMERGENCY DEPT VISIT HI MDM: CPT | Mod: 25

## 2023-08-31 PROCEDURE — 87116 MYCOBACTERIA CULTURE: CPT

## 2023-08-31 PROCEDURE — 85730 THROMBOPLASTIN TIME PARTIAL: CPT

## 2023-08-31 PROCEDURE — 86341 ISLET CELL ANTIBODY: CPT

## 2023-08-31 PROCEDURE — 80053 COMPREHEN METABOLIC PANEL: CPT

## 2023-08-31 PROCEDURE — 36430 TRANSFUSION BLD/BLD COMPNT: CPT

## 2023-08-31 PROCEDURE — 82728 ASSAY OF FERRITIN: CPT

## 2023-08-31 PROCEDURE — 82803 BLOOD GASES ANY COMBINATION: CPT

## 2023-08-31 PROCEDURE — 99239 HOSP IP/OBS DSCHRG MGMT >30: CPT | Mod: GC

## 2023-08-31 PROCEDURE — 96360 HYDRATION IV INFUSION INIT: CPT

## 2023-08-31 PROCEDURE — 83540 ASSAY OF IRON: CPT

## 2023-08-31 PROCEDURE — 82962 GLUCOSE BLOOD TEST: CPT

## 2023-08-31 PROCEDURE — 83036 HEMOGLOBIN GLYCOSYLATED A1C: CPT

## 2023-08-31 PROCEDURE — 84100 ASSAY OF PHOSPHORUS: CPT

## 2023-08-31 PROCEDURE — P9040: CPT

## 2023-08-31 PROCEDURE — 84681 ASSAY OF C-PEPTIDE: CPT

## 2023-08-31 PROCEDURE — 86901 BLOOD TYPING SEROLOGIC RH(D): CPT

## 2023-08-31 PROCEDURE — 82607 VITAMIN B-12: CPT

## 2023-08-31 PROCEDURE — 84132 ASSAY OF SERUM POTASSIUM: CPT

## 2023-08-31 PROCEDURE — 87389 HIV-1 AG W/HIV-1&-2 AB AG IA: CPT

## 2023-08-31 PROCEDURE — 85027 COMPLETE CBC AUTOMATED: CPT

## 2023-08-31 PROCEDURE — 0225U NFCT DS DNA&RNA 21 SARSCOV2: CPT

## 2023-08-31 PROCEDURE — 87015 SPECIMEN INFECT AGNT CONCNTJ: CPT

## 2023-08-31 PROCEDURE — 36415 COLL VENOUS BLD VENIPUNCTURE: CPT

## 2023-08-31 PROCEDURE — 71045 X-RAY EXAM CHEST 1 VIEW: CPT

## 2023-08-31 PROCEDURE — G0378: CPT

## 2023-08-31 PROCEDURE — 83605 ASSAY OF LACTIC ACID: CPT

## 2023-08-31 PROCEDURE — 86900 BLOOD TYPING SEROLOGIC ABO: CPT

## 2023-08-31 PROCEDURE — 84443 ASSAY THYROID STIM HORMONE: CPT

## 2023-08-31 PROCEDURE — 86850 RBC ANTIBODY SCREEN: CPT

## 2023-08-31 PROCEDURE — 83735 ASSAY OF MAGNESIUM: CPT

## 2023-08-31 PROCEDURE — 86923 COMPATIBILITY TEST ELECTRIC: CPT

## 2023-08-31 RX ORDER — METFORMIN HYDROCHLORIDE 850 MG/1
1 TABLET ORAL
Qty: 60 | Refills: 3
Start: 2023-08-31

## 2023-08-31 RX ORDER — FERROUS SULFATE 325(65) MG
1 TABLET ORAL
Qty: 30 | Refills: 3
Start: 2023-08-31

## 2023-08-31 RX ORDER — ISOPROPYL ALCOHOL, BENZOCAINE .7; .06 ML/ML; ML/ML
0 SWAB TOPICAL
Qty: 100 | Refills: 1
Start: 2023-08-31

## 2023-08-31 RX ORDER — PYRIDOXINE HCL (VITAMIN B6) 100 MG
1 TABLET ORAL
Refills: 0 | DISCHARGE

## 2023-08-31 RX ORDER — INSULIN LISPRO 100/ML
12 VIAL (ML) SUBCUTANEOUS
Qty: 1 | Refills: 11
Start: 2023-08-31

## 2023-08-31 RX ORDER — PYRAZINAMIDE 500 MG/1
3 TABLET ORAL
Qty: 90 | Refills: 0
Start: 2023-08-31 | End: 2023-09-29

## 2023-08-31 RX ORDER — PYRAZINAMIDE 500 MG/1
3 TABLET ORAL
Refills: 0 | DISCHARGE

## 2023-08-31 RX ORDER — RIFAMPIN 300 MG
2 CAPSULE ORAL
Refills: 0 | DISCHARGE

## 2023-08-31 RX ORDER — INSULIN GLARGINE 100 [IU]/ML
12 INJECTION, SOLUTION SUBCUTANEOUS
Qty: 1 | Refills: 11
Start: 2023-08-31

## 2023-08-31 RX ORDER — INSULIN GLARGINE 100 [IU]/ML
12 INJECTION, SOLUTION SUBCUTANEOUS
Qty: 2 | Refills: 1
Start: 2023-08-31 | End: 2023-10-31

## 2023-08-31 RX ORDER — HEXAVITAMINS
1 TABLET ORAL
Qty: 30 | Refills: 0
Start: 2023-08-31 | End: 2023-09-29

## 2023-08-31 RX ORDER — INSULIN LISPRO 100/ML
12 VIAL (ML) SUBCUTANEOUS
Qty: 4 | Refills: 1
Start: 2023-08-31 | End: 2023-10-31

## 2023-08-31 RX ORDER — HEXAVITAMINS
1 TABLET ORAL
Refills: 0 | DISCHARGE

## 2023-08-31 RX ORDER — RIFAMPIN 300 MG
2 CAPSULE ORAL
Qty: 60 | Refills: 0
Start: 2023-08-31 | End: 2023-09-29

## 2023-08-31 RX ORDER — METFORMIN HYDROCHLORIDE 850 MG/1
1 TABLET ORAL
Refills: 0 | DISCHARGE

## 2023-08-31 RX ORDER — PYRIDOXINE HCL (VITAMIN B6) 100 MG
1 TABLET ORAL
Qty: 30 | Refills: 3
Start: 2023-08-31

## 2023-08-31 RX ADMIN — Medication 2: at 08:53

## 2023-08-31 RX ADMIN — Medication 325 MILLIGRAM(S): at 12:42

## 2023-08-31 RX ADMIN — Medication 50 MILLIGRAM(S): at 12:43

## 2023-08-31 RX ADMIN — Medication 10 UNIT(S): at 08:54

## 2023-08-31 RX ADMIN — Medication 10 UNIT(S): at 17:59

## 2023-08-31 RX ADMIN — Medication 300 MILLIGRAM(S): at 17:17

## 2023-08-31 RX ADMIN — PYRAZINAMIDE 1500 MILLIGRAM(S): 500 TABLET ORAL at 17:17

## 2023-08-31 RX ADMIN — Medication 4: at 17:58

## 2023-08-31 RX ADMIN — Medication 10 UNIT(S): at 12:44

## 2023-08-31 RX ADMIN — Medication 2: at 12:44

## 2023-08-31 NOTE — PROGRESS NOTE ADULT - PROBLEM SELECTOR PLAN 3
Pt presenting with Hgb 7.2, microcytic anemia. No signs of bleeding on exam. Pt with report of hematuria in past few months (UA neg for blood). Denies hematochezia or bleeding. Received 1u PRBC with appropriate Hgb response. Ddx: acute blood loss 2/2 hematuria (though no bleeding currently) vs. sideroblastic anemia (known side effect of isoniazid) vs. painless LGIB given ongoing LLQ tenderness (consider diverticulosis?, also Fhx of colon cancer without recent colonoscopy).   - TSH, B12, folate wnl  - iron 35 (low), TIBC 397, however recent pRBC transfusion which may elevate iron levels    Plan:   - trend CBC; hemoglobin levels 8/31 8.1; 8/30 7.6; 8/29 6.5  - maintain active T+S  - transfuse for Hgb < 7  - start on ferrous sulfate 325 mg, oral, daily

## 2023-08-31 NOTE — PROGRESS NOTE ADULT - PROBLEM SELECTOR PLAN 1
Pt with newly diagnosed diabetes, found to have glucose of 657 on outpatient labs. In the ED, received 2L NS and 6 units insulin with glucose 446 -> 301. UA w/o ketones, BHB 0.5 without acidosis or AG, not in DKA. Found to have A1c 11.3.   Home med: metformin 500 mg bid (has not taken dose yet).     Plan:   - endo consulted, appreciate recs  - diabetes educator consulted  - c/w lantus 15 units at bedtime  - updated to 10 units lispro premeal  - updated to moderate iSS  - f/u c-peptide, insulin antibody  - monitor FS Pt with newly diagnosed diabetes, found to have glucose of 657 on outpatient labs. In the ED, received 2L NS and 6 units insulin with glucose 446 -> 301. UA w/o ketones, BHB 0.5 without acidosis or AG, not in DKA. Found to have A1c 11.3.   Home med: metformin 500 mg bid (has not taken dose yet).     Plan:   - endo consulted, appreciate recs  - diabetes educator consulted  - c/w lantus 15 units at bedtime  - updated to 10 units lispro premeal  - updated to moderate iSS  - f/u c-peptide, insulin antibody; c-peptide 1.0 (normal range 1.1-4.4)  - monitor FS Pt with newly diagnosed diabetes, found to have glucose of 657 on outpatient labs. In the ED, received 2L NS and 6 units insulin with glucose 446 -> 301. UA w/o ketones, BHB 0.5 without acidosis or AG, not in DKA. Found to have A1c 11.3.   Home med: metformin 500 mg bid (has not taken dose yet).     Plan:   - endo consulted, appreciate recs  - As per recs; ordered 500 mg metformin BID, premeal lispro 12 units TID, and nighttime Lantus 12 units  - diabetes educator consulted  - updated to moderate iSS  - f/u c-peptide, insulin antibody; c-peptide 1.0 (normal range 1.1-4.4)  - monitor FS

## 2023-08-31 NOTE — PROGRESS NOTE ADULT - ASSESSMENT
47 y/o [place]er-speaking M w/ PMH of alcohol use disorder (last drink 3 months ago), pulmonary TB (on RIP, diagnosed 06/29) sent in by PMD for hyperglycemia on outpatient labs i/s/o newly diagnosed diabetes. Pt s/p 2 L NS and 6 units insulin with downtrending glucose. Admitted for management of hyperglycemia and optimization of diabetes regimen.

## 2023-08-31 NOTE — PROGRESS NOTE ADULT - PROBLEM SELECTOR PLAN 2
Pt diagnosed with TB on 06/29 at [facility] on RIPE therapy with improvement of symptoms. Reports improving cough, denies hemoptysis or fever/chills. CXR w/ b/l consolidation and possible cavity in L midlung c/w hx of TB. Last positive sputum culture (07/13), multiple negative cultures.   Home meds: rifampin 600 mg bid, isoniazid 300 mg q24h, pyrazinamide 1500 mg q24h, vitamin B6    Plan:  -c/w home meds  - f/u AFB sputum culture; no acid fast bacilli seen in sputum (8/30/23)  - increased B6 dosage from 25mg to 50mg

## 2023-08-31 NOTE — PROGRESS NOTE ADULT - SUBJECTIVE AND OBJECTIVE BOX
SUBJECTIVE / INTERVAL HPI: Patient was seen and examined this morning.     Overnight events:    Endocrine course  8/29: Lantus 15 + lispro 5 u   8/30: latnus 15 + lispro 10 u    CAPILLARY BLOOD GLUCOSE & INSULIN RECEIVED  236 mg/dL (08-30 @ 17:57) - 4 u miss lispro + 10 u lantus  322 mg/dL (08-30 @ 21:22) - lantus 15 + 4 u miss lispro  175 mg/dL (08-31 @ 08:21) - 2 u miss lispro + 10 u lispro      REVIEW OF SYSTEMS  Constitutional:  Negative fever, chills or loss of appetite.  Eyes:  Negative blurry vision or double vision.  Cardiovascular:  Negative for chest pain or palpitations.  Respiratory:  Negative for cough, wheezing, or shortness of breath.    Gastrointestinal:  Negative for nausea, vomiting, diarrhea, constipation, or abdominal pain.  Genitourinary:  Negative frequency, urgency or dysuria.  Neurologic:  No headache, confusion, dizziness, lightheadedness.    PHYSICAL EXAM  Vital Signs Last 24 Hrs  T(C): 36.6 (31 Aug 2023 06:00), Max: 36.7 (30 Aug 2023 11:50)  T(F): 97.8 (31 Aug 2023 06:00), Max: 98 (30 Aug 2023 11:50)  HR: 87 (31 Aug 2023 06:00) (87 - 105)  BP: 102/66 (31 Aug 2023 06:00) (94/60 - 102/66)  BP(mean): 78 (31 Aug 2023 06:00) (78 - 78)  RR: 16 (31 Aug 2023 06:00) (16 - 18)  SpO2: 98% (31 Aug 2023 06:00) (98% - 99%)    Parameters below as of 31 Aug 2023 06:00  Patient On (Oxygen Delivery Method): room air        Constitutional: Awake, alert, in no acute distress.   HEENT: Normocephalic, atraumatic, CHIKIS.  Respiratory: Lungs clear to ausculation bilaterally.   Cardiovascular: regular rhythm, normal S1 and S2, no audible murmurs.   GI: soft, non-tender, non-distended, bowel sounds present.  Extremities: No lower extremity edema.    LABS  CBC - WBC/HGB/HTC/PLT: 5.44/8.1/27.4/290 (08-31-23)  BMP - Na/K/Cl/Bicarb/BUN/Cr/Gluc/AG/eGFR: 136/3.9/106/21/9/0.51/156/9/126 (08-31-23)  Ca - 9.0 (08-31-23)  Phos - 5.0 (08-31-23)  Mg - 1.9 (08-31-23)  LFT - Alb/Tprot/Tbili/Dbili/AlkPhos/ALT/AST: 3.5/--/0.7/--/109/14/25 (08-31-23)  PT/aPTT/INR: 12.8/36.4/1.13 (08-29-23)   Thyroid Stimulating Hormone, Serum: 2.220 (08-30-23)          MEDICATIONS  MEDICATIONS  (STANDING):  dextrose 5%. 1000 milliLiter(s) (100 mL/Hr) IV Continuous <Continuous>  dextrose 5%. 1000 milliLiter(s) (50 mL/Hr) IV Continuous <Continuous>  dextrose 50% Injectable 25 Gram(s) IV Push once  dextrose 50% Injectable 25 Gram(s) IV Push once  dextrose 50% Injectable 12.5 Gram(s) IV Push once  ferrous    sulfate 325 milliGRAM(s) Oral daily  glucagon  Injectable 1 milliGRAM(s) IntraMuscular once  insulin glargine Injectable (LANTUS) 15 Unit(s) SubCutaneous at bedtime  insulin lispro (ADMELOG) corrective regimen sliding scale   SubCutaneous at bedtime  insulin lispro (ADMELOG) corrective regimen sliding scale   SubCutaneous three times a day before meals  insulin lispro Injectable (ADMELOG) 10 Unit(s) SubCutaneous three times a day before meals  isoniazid 300 milliGRAM(s) Oral every 24 hours  pyrazinamide 1500 milliGRAM(s) Oral every 24 hours  pyridoxine 50 milliGRAM(s) Oral daily  rifAMPin 600 milliGRAM(s) Oral every 24 hours    MEDICATIONS  (PRN):  acetaminophen     Tablet .. 1000 milliGRAM(s) Oral every 6 hours PRN Temp greater or equal to 38C (100.4F), Mild Pain (1 - 3)  aluminum hydroxide/magnesium hydroxide/simethicone Suspension 30 milliLiter(s) Oral every 4 hours PRN Dyspepsia  dextrose Oral Gel 15 Gram(s) Oral once PRN Blood Glucose LESS THAN 70 milliGRAM(s)/deciliter  melatonin 5 milliGRAM(s) Oral at bedtime PRN Insomnia  ondansetron Injectable 4 milliGRAM(s) IV Push every 8 hours PRN Nausea and/or Vomiting    ASSESSMENT / RECOMMENDATIONS    48y Male with a past medical history of etoh use disorder, recent diagnosis of pulm TB (on INH/Rifamin/PZA started on 6/29) and recent diagnosis of DM, was sent to hospital by his PMD for glucose > 600.  Pt endorses polydipsia and polyuria and recently diagnosed with DM outpatient less than one week ago.  Endocrinology has been consulted for Diabetes Management.    A1C: 11.3 %  BUN: 9  Creatinine: 0.51  GFR: 126  Weight: 59  BMI:     # Type 2 diabetes mellitus with hyperglycemia  - Please continue lantus   units at bedtime.   - Continue lispro  units before each meal.  - please change to lispro moderate dose sliding scale before meals and at bedtime.  - will f/u insulin antibodies and c-peptide  - Patient's fingerstick glucose goal is 100-180 mg/dL.    - Discharge recommendations to be discussed.   - Patient can follow up at discharge with [facility] by calling [phone] to make an appointment.       Case discussed with Dr. Sun. Primary team updated.       Jordana Bell  Endocrinology Fellow    Service Pager: [phone]    SUBJECTIVE / INTERVAL HPI: Patient was seen and examined this morning.     Overnight events:  reports feeling well  ate fish, potatoes, brocolli for dinner, cheesecake for dessert  this morning he had eggs, sausage, fruit, pineapple  he states he is okay to do insulin   [id number] was utilized     Endocrine course  8/29: Lantus 15 + lispro 5 u   8/30:  Lantus 15 + lispro 10 u    CAPILLARY BLOOD GLUCOSE & INSULIN RECEIVED  236 mg/dL (08-30 @ 17:57) - 4 u miss lispro + 10 u lantus  322 mg/dL (08-30 @ 21:22) - lantus 15 + 4 u miss lispro  175 mg/dL (08-31 @ 08:21) - 2 u miss lispro + 10 u lispro  184 @ noon      REVIEW OF SYSTEMS  Constitutional:  Negative fever, chills or loss of appetite.  Eyes:  Negative blurry vision or double vision.  Cardiovascular:  Negative for chest pain or palpitations.  Respiratory:  Negative for cough, wheezing, or shortness of breath.    Gastrointestinal:  Negative for nausea, vomiting, diarrhea, constipation, or abdominal pain.  Genitourinary:  Negative frequency, urgency or dysuria.  Neurologic:  No headache, confusion, dizziness, lightheadedness.    PHYSICAL EXAM  Vital Signs Last 24 Hrs  T(C): 36.6 (31 Aug 2023 06:00), Max: 36.7 (30 Aug 2023 11:50)  T(F): 97.8 (31 Aug 2023 06:00), Max: 98 (30 Aug 2023 11:50)  HR: 87 (31 Aug 2023 06:00) (87 - 105)  BP: 102/66 (31 Aug 2023 06:00) (94/60 - 102/66)  BP(mean): 78 (31 Aug 2023 06:00) (78 - 78)  RR: 16 (31 Aug 2023 06:00) (16 - 18)  SpO2: 98% (31 Aug 2023 06:00) (98% - 99%)    Parameters below as of 31 Aug 2023 06:00  Patient On (Oxygen Delivery Method): room air        Constitutional: Awake, alert, in no acute distress.   HEENT: Normocephalic, atraumatic, CHIKIS.  Respiratory: Lungs clear to ausculation bilaterally.   Cardiovascular: regular rhythm, normal S1 and S2, no audible murmurs.   GI: soft, non-tender, non-distended, bowel sounds present.  Extremities: No lower extremity edema.    LABS  CBC - WBC/HGB/HTC/PLT: 5.44/8.1/27.4/290 (08-31-23)  BMP - Na/K/Cl/Bicarb/BUN/Cr/Gluc/AG/eGFR: 136/3.9/106/21/9/0.51/156/9/126 (08-31-23)  Ca - 9.0 (08-31-23)  Phos - 5.0 (08-31-23)  Mg - 1.9 (08-31-23)  LFT - Alb/Tprot/Tbili/Dbili/AlkPhos/ALT/AST: 3.5/--/0.7/--/109/14/25 (08-31-23)  PT/aPTT/INR: 12.8/36.4/1.13 (08-29-23)   Thyroid Stimulating Hormone, Serum: 2.220 (08-30-23)          MEDICATIONS  MEDICATIONS  (STANDING):  dextrose 5%. 1000 milliLiter(s) (100 mL/Hr) IV Continuous <Continuous>  dextrose 5%. 1000 milliLiter(s) (50 mL/Hr) IV Continuous <Continuous>  dextrose 50% Injectable 25 Gram(s) IV Push once  dextrose 50% Injectable 25 Gram(s) IV Push once  dextrose 50% Injectable 12.5 Gram(s) IV Push once  ferrous    sulfate 325 milliGRAM(s) Oral daily  glucagon  Injectable 1 milliGRAM(s) IntraMuscular once  insulin glargine Injectable (LANTUS) 15 Unit(s) SubCutaneous at bedtime  insulin lispro (ADMELOG) corrective regimen sliding scale   SubCutaneous at bedtime  insulin lispro (ADMELOG) corrective regimen sliding scale   SubCutaneous three times a day before meals  insulin lispro Injectable (ADMELOG) 10 Unit(s) SubCutaneous three times a day before meals  isoniazid 300 milliGRAM(s) Oral every 24 hours  pyrazinamide 1500 milliGRAM(s) Oral every 24 hours  pyridoxine 50 milliGRAM(s) Oral daily  rifAMPin 600 milliGRAM(s) Oral every 24 hours    MEDICATIONS  (PRN):  acetaminophen     Tablet .. 1000 milliGRAM(s) Oral every 6 hours PRN Temp greater or equal to 38C (100.4F), Mild Pain (1 - 3)  aluminum hydroxide/magnesium hydroxide/simethicone Suspension 30 milliLiter(s) Oral every 4 hours PRN Dyspepsia  dextrose Oral Gel 15 Gram(s) Oral once PRN Blood Glucose LESS THAN 70 milliGRAM(s)/deciliter  melatonin 5 milliGRAM(s) Oral at bedtime PRN Insomnia  ondansetron Injectable 4 milliGRAM(s) IV Push every 8 hours PRN Nausea and/or Vomiting    ASSESSMENT / RECOMMENDATIONS    48y Male with a past medical history of etoh use disorder, recent diagnosis of pulm TB (on INH/Rifamin/PZA started on 6/29) and recent diagnosis of DM, was sent to hospital by his PMD for glucose > 600.  Pt endorses polydipsia and polyuria and recently diagnosed with DM outpatient less than one week ago.  Endocrinology has been consulted for Diabetes Management.    A1C: 11.3 %  BUN: 9  Creatinine: 0.51  GFR: 126  Weight: 59  C-peptide: 1.0     # Type 2 diabetes mellitus with hyperglycemia  - DC recommendations: metformin 500 bid + lantus 12 + lispro 12 tid  - Insulin bedside teaching being done by RN  - seen by Dietitian   - Patient's fingerstick glucose goal is 100-180 mg/dL.    - Discharge recommendations to be discussed.   - Patient can follow up at discharge with [facility] by calling [phone] to make an appointment.       Case discussed with Dr. Sun. Primary team updated.       Jordana Bell  Endocrinology Fellow    Service Pager: [phone]

## 2023-08-31 NOTE — DISCHARGE NOTE NURSING/CASE MANAGEMENT/SOCIAL WORK - NSDCPEFALRISK_GEN_ALL_CORE
For information on Fall & Injury Prevention, visit: [email or web address] OR  [email or web address] OR  https://www.cdc.gov/steadi/patient.html

## 2023-08-31 NOTE — CHART NOTE - NSCHARTNOTEFT_GEN_A_CORE
INFECTIOUS DISEASES BRIEF NOTE    Called to comment on airborne isolation clearance.    48M h/o pulmonary TB on RIPE (dx 6/29/23) p/w hyperglycemia.  Patient goes to [facility] [place] [facility] and is compliant with TB med.  CHELSEA reports patient has negative AFB smear from 8/24 and 7/25.  OK to discontinue isolation.    If you have any questions, please feel free to contact me.  Case d/w hospital epidemiology.   *this is not a consult note*    Yadi Travis MD, MS  Infectious Disease attending  work cell [phone]

## 2023-08-31 NOTE — PROGRESS NOTE ADULT - PROBLEM SELECTOR PLAN 4
Pt presents with tenderness to palpation of his left lower quadrant. Patient however denies any hematochezia, constipation, and diarrhea. However, fhx of colon cancer where mom was diagnosed in 60s.     - follow-up outpatient: consider diverticulosis vs diverticulitis vs malignancy with optional imaging of CT abdomen and pelvis w/ contrast  - appointment with PCP set-up at [facility] to establish care and seek referral for GI specialist

## 2023-08-31 NOTE — PROGRESS NOTE ADULT - PROBLEM SELECTOR PLAN 5
Pt endorses bilateral leg pain extending up to the level of the knee. The pain is equal bilaterally and started soon after taking his tuberculosis medications. Ddx: consider as side effect of TB drugs vs. neuropathic pain 2/2 uncontrolled DM.    Plan:  - see DM management above  - increase dose of vitamin B6 to 50 mg, oral, daily

## 2023-08-31 NOTE — PROGRESS NOTE ADULT - PROBLEM SELECTOR PLAN 6
F: s/p 2L NS, tolerating PO  E: Replete Mg <2 and K <4   N: Consistent carb  DVT ppx: SCDs  GI ppx: none    Dispo: [facility] then home

## 2023-08-31 NOTE — PROGRESS NOTE ADULT - SUBJECTIVE AND OBJECTIVE BOX
OVERNIGHT EVENTS:    SUBJECTIVE / INTERVAL HPI: Patient seen and examined at bedside.       PHYSICAL EXAM:    General: NAD  HEENT: PERRL, anicteric sclera; MMM  Neck: supple  Cardiovascular: +S1/S2, RRR  Respiratory: CTA B/L; normal wob  Gastrointestinal: soft, NT/ND; +BSx4  Extremities: WWP; no edema, clubbing or cyanosis  Vascular: 2+ radial, DP/PT pulses B/L  Neurological: AAOx3; no focal deficits  Psychiatric: pleasant mood and affect  Dermatologic: no appreciable wounds or damage to the skin    VITAL SIGNS:  Vital Signs Last 24 Hrs  T(C): 36.6 (31 Aug 2023 06:00), Max: 36.7 (30 Aug 2023 11:50)  T(F): 97.8 (31 Aug 2023 06:00), Max: 98 (30 Aug 2023 11:50)  HR: 87 (31 Aug 2023 06:00) (87 - 105)  BP: 102/66 (31 Aug 2023 06:00) (94/60 - 102/66)  BP(mean): 78 (31 Aug 2023 06:00) (78 - 78)  RR: 16 (31 Aug 2023 06:00) (16 - 18)  SpO2: 98% (31 Aug 2023 06:00) (98% - 99%)    Parameters below as of 31 Aug 2023 06:00  Patient On (Oxygen Delivery Method): room air          MEDICATIONS:  MEDICATIONS  (STANDING):  dextrose 5%. 1000 milliLiter(s) (50 mL/Hr) IV Continuous <Continuous>  dextrose 5%. 1000 milliLiter(s) (100 mL/Hr) IV Continuous <Continuous>  dextrose 50% Injectable 25 Gram(s) IV Push once  dextrose 50% Injectable 25 Gram(s) IV Push once  dextrose 50% Injectable 12.5 Gram(s) IV Push once  ferrous    sulfate 325 milliGRAM(s) Oral daily  glucagon  Injectable 1 milliGRAM(s) IntraMuscular once  insulin glargine Injectable (LANTUS) 15 Unit(s) SubCutaneous at bedtime  insulin lispro (ADMELOG) corrective regimen sliding scale   SubCutaneous at bedtime  insulin lispro (ADMELOG) corrective regimen sliding scale   SubCutaneous three times a day before meals  insulin lispro Injectable (ADMELOG) 10 Unit(s) SubCutaneous three times a day before meals  isoniazid 300 milliGRAM(s) Oral every 24 hours  pyrazinamide 1500 milliGRAM(s) Oral every 24 hours  pyridoxine 50 milliGRAM(s) Oral daily  rifAMPin 600 milliGRAM(s) Oral every 24 hours    MEDICATIONS  (PRN):  acetaminophen     Tablet .. 1000 milliGRAM(s) Oral every 6 hours PRN Temp greater or equal to 38C (100.4F), Mild Pain (1 - 3)  aluminum hydroxide/magnesium hydroxide/simethicone Suspension 30 milliLiter(s) Oral every 4 hours PRN Dyspepsia  dextrose Oral Gel 15 Gram(s) Oral once PRN Blood Glucose LESS THAN 70 milliGRAM(s)/deciliter  melatonin 5 milliGRAM(s) Oral at bedtime PRN Insomnia  ondansetron Injectable 4 milliGRAM(s) IV Push every 8 hours PRN Nausea and/or Vomiting      ALLERGIES:  Allergies    No Known Allergies    Intolerances        LABS:                        8.1    5.44  )-----------( 290      ( 31 Aug 2023 05:30 )             27.4     08-31    136  |  106  |  9   ----------------------------<  156<H>  3.9   |  21<L>  |  0.51    Ca    9.0      31 Aug 2023 05:30  Phos  5.0     08-31  Mg     1.9     08-31    TPro  8.0  /  Alb  3.5  /  TBili  0.7  /  DBili  x   /  AST  25  /  ALT  14  /  AlkPhos  109  08-31    PT/INR - ( 29 Aug 2023 12:18 )   PT: 12.8 sec;   INR: 1.13          PTT - ( 29 Aug 2023 12:18 )  PTT:36.4 sec  Urinalysis Basic - ( 31 Aug 2023 05:30 )    Color: x / Appearance: x / SG: x / pH: x  Gluc: 156 mg/dL / Ketone: x  / Bili: x / Urobili: x   Blood: x / Protein: x / Nitrite: x   Leuk Esterase: x / RBC: x / WBC x   Sq Epi: x / Non Sq Epi: x / Bacteria: x      CAPILLARY BLOOD GLUCOSE      POCT Blood Glucose.: 175 mg/dL (31 Aug 2023 08:21)      RADIOLOGY & ADDITIONAL TESTS: Reviewed. OVERNIGHT EVENTS: Patient was hypotensive to 94/60 and tachycardic to 105 last night, asymptomatic and resolved without intervention.     SUBJECTIVE / INTERVAL HPI: Patient seen and examined at bedside. No complaints--notes that the LLQ pain and b/l lower extremity pain has remained stable and the lower extremity pain has only bothered him intermittently when ambulating.     PHYSICAL EXAM:    General: NAD  HEENT: PERRL, anicteric sclera; MMM  Neck: supple  Cardiovascular: +S1/S2, RRR  Respiratory: CTA B/L; normal wob  Gastrointestinal: soft, tender to palpation in LLQ/ND; +BSx4  Extremities: WWP; no edema, clubbing or cyanosis  Vascular: 2+ radial, DP/PT pulses B/L  Neurological: AAOx3; no focal deficits  Psychiatric: pleasant mood and affect  Dermatologic: no appreciable wounds or damage to the skin    VITAL SIGNS:  Vital Signs Last 24 Hrs  T(C): 36.6 (31 Aug 2023 06:00), Max: 36.7 (30 Aug 2023 11:50)  T(F): 97.8 (31 Aug 2023 06:00), Max: 98 (30 Aug 2023 11:50)  HR: 87 (31 Aug 2023 06:00) (87 - 105)  BP: 102/66 (31 Aug 2023 06:00) (94/60 - 102/66)  BP(mean): 78 (31 Aug 2023 06:00) (78 - 78)  RR: 16 (31 Aug 2023 06:00) (16 - 18)  SpO2: 98% (31 Aug 2023 06:00) (98% - 99%)    Parameters below as of 31 Aug 2023 06:00  Patient On (Oxygen Delivery Method): room air          MEDICATIONS:  MEDICATIONS  (STANDING):  dextrose 5%. 1000 milliLiter(s) (50 mL/Hr) IV Continuous <Continuous>  dextrose 5%. 1000 milliLiter(s) (100 mL/Hr) IV Continuous <Continuous>  dextrose 50% Injectable 25 Gram(s) IV Push once  dextrose 50% Injectable 25 Gram(s) IV Push once  dextrose 50% Injectable 12.5 Gram(s) IV Push once  ferrous    sulfate 325 milliGRAM(s) Oral daily  glucagon  Injectable 1 milliGRAM(s) IntraMuscular once  insulin glargine Injectable (LANTUS) 15 Unit(s) SubCutaneous at bedtime  insulin lispro (ADMELOG) corrective regimen sliding scale   SubCutaneous at bedtime  insulin lispro (ADMELOG) corrective regimen sliding scale   SubCutaneous three times a day before meals  insulin lispro Injectable (ADMELOG) 10 Unit(s) SubCutaneous three times a day before meals  isoniazid 300 milliGRAM(s) Oral every 24 hours  pyrazinamide 1500 milliGRAM(s) Oral every 24 hours  pyridoxine 50 milliGRAM(s) Oral daily  rifAMPin 600 milliGRAM(s) Oral every 24 hours    MEDICATIONS  (PRN):  acetaminophen     Tablet .. 1000 milliGRAM(s) Oral every 6 hours PRN Temp greater or equal to 38C (100.4F), Mild Pain (1 - 3)  aluminum hydroxide/magnesium hydroxide/simethicone Suspension 30 milliLiter(s) Oral every 4 hours PRN Dyspepsia  dextrose Oral Gel 15 Gram(s) Oral once PRN Blood Glucose LESS THAN 70 milliGRAM(s)/deciliter  melatonin 5 milliGRAM(s) Oral at bedtime PRN Insomnia  ondansetron Injectable 4 milliGRAM(s) IV Push every 8 hours PRN Nausea and/or Vomiting      ALLERGIES:  Allergies    No Known Allergies    Intolerances  LABS:                        8.1    5.44  )-----------( 290      ( 31 Aug 2023 05:30 )             27.4     08-31    136  |  106  |  9   ----------------------------<  156<H>  3.9   |  21<L>  |  0.51    Ca    9.0      31 Aug 2023 05:30  Phos  5.0     08-31  Mg     1.9     08-31    TPro  8.0  /  Alb  3.5  /  TBili  0.7  /  DBili  x   /  AST  25  /  ALT  14  /  AlkPhos  109  08-31    PT/INR - ( 29 Aug 2023 12:18 )   PT: 12.8 sec;   INR: 1.13          PTT - ( 29 Aug 2023 12:18 )  PTT:36.4 sec  Urinalysis Basic - ( 31 Aug 2023 05:30 )    Color: x / Appearance: x / SG: x / pH: x  Gluc: 156 mg/dL / Ketone: x  / Bili: x / Urobili: x   Blood: x / Protein: x / Nitrite: x   Leuk Esterase: x / RBC: x / WBC x   Sq Epi: x / Non Sq Epi: x / Bacteria: x      CAPILLARY BLOOD GLUCOSE      POCT Blood Glucose.: 175 mg/dL (31 Aug 2023 08:21)      RADIOLOGY & ADDITIONAL TESTS: Reviewed.

## 2023-08-31 NOTE — DISCHARGE NOTE NURSING/CASE MANAGEMENT/SOCIAL WORK - NSDCFUADDAPPT_GEN_ALL_CORE_FT
Please ensure you have a follow-up with a PCP. We have made an appointment for you with Dr. Micky Simon on 9/11 11:45 am at [facility]. It is essential you get both a GI (gastroenterology) appointment to discuss your anemia and family history of colon cancer. You should also have make an appointment with an endocrinologist, otherwise your PCP can help manage your diabetes.     It is also important you continue to follow-up with the [facility] for continued management of your tuberculosis.  Address: [address] [place] [address]  Phone: [phone]  You have an appointment on 9/5/23 at 10:30 am.

## 2023-08-31 NOTE — DISCHARGE NOTE NURSING/CASE MANAGEMENT/SOCIAL WORK - PATIENT PORTAL LINK FT
You can access the FollowMyHealth Patient Portal offered by [facility] by registering at the following website: [email or web address]. By joining [employer]’s FollowMyHealth portal, you will also be able to view your health information using other applications (apps) compatible with our system.

## 2023-08-31 NOTE — CHART NOTE - NSCHARTNOTEFT_GEN_A_CORE
Endocrine course  8/29: Lantus 15 + lispro 5 u   8/30:  Lantus 15 + lispro 10 u    CAPILLARY BLOOD GLUCOSE & INSULIN RECEIVED  236 mg/dL (08-30 @ 17:57) - 4 u miss lispro + 10 u lantus  322 mg/dL (08-30 @ 21:22) - lantus 15 + 4 u miss lispro  175 mg/dL (08-31 @ 08:21) - 2 u miss lispro + 10 u lispro  184 @ noon    Overnight events:  reports feeling well  ate fish, potatoes, brocolli for dinner, cheesecake for dessert  this morning he had eggs, sausage, fruit, pineapple  he states he is okay to do insulin   [id number] was utilized    ASSESSMENT / RECOMMENDATIONS    48y Male with a past medical history of etoh use disorder, recent diagnosis of pulm TB (on INH/Rifamin/PZA started on 6/29) and recent diagnosis of DM, was sent to hospital by his PMD for glucose > 600.  Pt endorses polydipsia and polyuria and recently diagnosed with DM outpatient less than one week ago.  Endocrinology has been consulted for Diabetes Management.    A1C: 11.3 %  BUN: 9  Creatinine: 0.51  GFR: 126  Weight: 59  C-peptide: 1.0     # Type 2 diabetes mellitus with hyperglycemia  - DC recommendations: metformin 500 bid + lantus 12 + lispro 12 tid  - Insulin bedside teaching being done by RN  - seen by Dietitian   - Patient's fingerstick glucose goal is 100-180 mg/dL.    - Discharge recommendations to be discussed.   - Patient can follow up at discharge with [facility] by calling [phone] to make an appointment.       Case discussed with Dr. Sun. Primary team updated. Endocrine course  8/29: Lantus 15 + lispro 5 u   8/30:  Lantus 15 + lispro 10 u    CAPILLARY BLOOD GLUCOSE & INSULIN RECEIVED  236 mg/dL (08-30 @ 17:57) - 4 u miss lispro + 10 u lantus  322 mg/dL (08-30 @ 21:22) - lantus 15 + 4 u miss lispro  175 mg/dL (08-31 @ 08:21) - 2 u miss lispro + 10 u lispro  184 @ noon    Overnight events:  reports feeling well  ate fish, potatoes, brocolli for dinner, cheesecake for dessert  this morning he had eggs, sausage, fruit, pineapple  he states he is okay to do insulin   [id number] was utilized    ASSESSMENT / RECOMMENDATIONS    48y Male with a past medical history of etoh use disorder, recent diagnosis of pulm TB (on INH/Rifamin/PZA started on 6/29) and recent diagnosis of DM, was sent to hospital by his PMD for glucose > 600.  Pt endorses polydipsia and polyuria and recently diagnosed with DM outpatient less than one week ago.  Endocrinology has been consulted for Diabetes Management.    A1C: 11.3 %  BUN: 9  Creatinine: 0.51  GFR: 126  Weight: 59  C-peptide: 1.0     # Type 2 diabetes mellitus with hyperglycemia  - DC recommendations: metformin 500 bid + lantus 12 + lispro 12 tid/ac  - Insulin bedside teaching being done by RN  - seen by Dietitian   - Patient's fingerstick glucose goal is 100-180 mg/dL.    - Discharge recommendations to be discussed.   - Patient can follow up at discharge with [facility] by calling [phone] to make an appointment.       Case discussed with Dr. Sun. Primary team updated.    Attending:  Above discussed with Dr. Bell.  Pt will be following up with his PCP    BREANNE Sun MD

## 2023-09-01 LAB — ISLET CELL512 AB SER-ACNC: SIGNIFICANT CHANGE UP

## 2023-09-02 LAB — GAD65 AB SER-MCNC: 0 NMOL/L — SIGNIFICANT CHANGE UP

## 2023-09-04 LAB — INSULIN ANTIBODIES: 6.4 UU/ML — HIGH

## 2023-09-07 DIAGNOSIS — D64.2 SECONDARY SIDEROBLASTIC ANEMIA DUE TO DRUGS AND TOXINS: ICD-10-CM

## 2023-09-07 DIAGNOSIS — E11.65 TYPE 2 DIABETES MELLITUS WITH HYPERGLYCEMIA: ICD-10-CM

## 2023-09-07 DIAGNOSIS — Z86.11 PERSONAL HISTORY OF TUBERCULOSIS: ICD-10-CM

## 2023-09-07 DIAGNOSIS — Z87.891 PERSONAL HISTORY OF NICOTINE DEPENDENCE: ICD-10-CM

## 2023-09-07 DIAGNOSIS — R00.0 TACHYCARDIA, UNSPECIFIED: ICD-10-CM

## 2023-09-07 DIAGNOSIS — T37.1X5A ADVERSE EFFECT OF ANTIMYCOBACTERIAL DRUGS, INITIAL ENCOUNTER: ICD-10-CM

## 2023-09-07 DIAGNOSIS — D50.9 IRON DEFICIENCY ANEMIA, UNSPECIFIED: ICD-10-CM

## 2023-09-07 DIAGNOSIS — E11.42 TYPE 2 DIABETES MELLITUS WITH DIABETIC POLYNEUROPATHY: ICD-10-CM

## 2023-09-07 DIAGNOSIS — F10.11 ALCOHOL ABUSE, IN REMISSION: ICD-10-CM

## 2023-09-07 DIAGNOSIS — R10.32 LEFT LOWER QUADRANT PAIN: ICD-10-CM

## 2023-09-07 DIAGNOSIS — Z79.84 LONG TERM (CURRENT) USE OF ORAL HYPOGLYCEMIC DRUGS: ICD-10-CM

## 2023-09-07 DIAGNOSIS — I95.9 HYPOTENSION, UNSPECIFIED: ICD-10-CM

## 2023-09-26 ENCOUNTER — EMERGENCY (EMERGENCY)
Facility: HOSPITAL | Age: 48
LOS: 1 days | Discharge: HOME | End: 2023-09-26
Attending: STUDENT IN AN ORGANIZED HEALTH CARE EDUCATION/TRAINING PROGRAM | Admitting: STUDENT IN AN ORGANIZED HEALTH CARE EDUCATION/TRAINING PROGRAM
Payer: COMMERCIAL

## 2023-09-26 VITALS
DIASTOLIC BLOOD PRESSURE: 80 MMHG | HEIGHT: 61 IN | RESPIRATION RATE: 17 BRPM | SYSTOLIC BLOOD PRESSURE: 119 MMHG | WEIGHT: 147.93 LBS | OXYGEN SATURATION: 100 % | HEART RATE: 102 BPM | TEMPERATURE: 98 F

## 2023-09-26 LAB
ALBUMIN SERPL ELPH-MCNC: 3.7 G/DL — SIGNIFICANT CHANGE UP (ref 3.3–5)
ALP SERPL-CCNC: 132 U/L — HIGH (ref 40–120)
ALT FLD-CCNC: 16 U/L — SIGNIFICANT CHANGE UP (ref 10–45)
ANION GAP SERPL CALC-SCNC: 9 MMOL/L — SIGNIFICANT CHANGE UP (ref 5–17)
ANISOCYTOSIS BLD QL: SLIGHT — SIGNIFICANT CHANGE UP
APPEARANCE UR: CLEAR — SIGNIFICANT CHANGE UP
AST SERPL-CCNC: 27 U/L — SIGNIFICANT CHANGE UP (ref 10–40)
B-OH-BUTYR SERPL-SCNC: 0.1 MMOL/L — SIGNIFICANT CHANGE UP
BASE EXCESS BLDV CALC-SCNC: -3.8 MMOL/L — LOW (ref -2–3)
BASOPHILS # BLD AUTO: 0.03 K/UL — SIGNIFICANT CHANGE UP (ref 0–0.2)
BASOPHILS NFR BLD AUTO: 0.9 % — SIGNIFICANT CHANGE UP (ref 0–2)
BILIRUB SERPL-MCNC: 0.5 MG/DL — SIGNIFICANT CHANGE UP (ref 0.2–1.2)
BILIRUB UR-MCNC: NEGATIVE — SIGNIFICANT CHANGE UP
BUN SERPL-MCNC: 11 MG/DL — SIGNIFICANT CHANGE UP (ref 7–23)
CALCIUM SERPL-MCNC: 9.1 MG/DL — SIGNIFICANT CHANGE UP (ref 8.4–10.5)
CHLORIDE SERPL-SCNC: 103 MMOL/L — SIGNIFICANT CHANGE UP (ref 96–108)
CO2 BLDV-SCNC: 22.7 MMOL/L — SIGNIFICANT CHANGE UP (ref 22–26)
CO2 SERPL-SCNC: 22 MMOL/L — SIGNIFICANT CHANGE UP (ref 22–31)
COLOR SPEC: YELLOW — SIGNIFICANT CHANGE UP
CREAT SERPL-MCNC: 0.84 MG/DL — SIGNIFICANT CHANGE UP (ref 0.5–1.3)
DACRYOCYTES BLD QL SMEAR: SLIGHT — SIGNIFICANT CHANGE UP
DIFF PNL FLD: NEGATIVE — SIGNIFICANT CHANGE UP
EGFR: 108 ML/MIN/1.73M2 — SIGNIFICANT CHANGE UP
EOSINOPHIL # BLD AUTO: 0.13 K/UL — SIGNIFICANT CHANGE UP (ref 0–0.5)
EOSINOPHIL NFR BLD AUTO: 3.7 % — SIGNIFICANT CHANGE UP (ref 0–6)
GAS PNL BLDV: SIGNIFICANT CHANGE UP
GIANT PLATELETS BLD QL SMEAR: PRESENT — SIGNIFICANT CHANGE UP
GLUCOSE SERPL-MCNC: 590 MG/DL — CRITICAL HIGH (ref 70–99)
GLUCOSE UR QL: >=1000
HCO3 BLDV-SCNC: 22 MMOL/L — SIGNIFICANT CHANGE UP (ref 22–29)
HCT VFR BLD CALC: 24.5 % — LOW (ref 39–50)
HGB BLD-MCNC: 7.2 G/DL — LOW (ref 13–17)
HYPOCHROMIA BLD QL: SIGNIFICANT CHANGE UP
KETONES UR-MCNC: NEGATIVE — SIGNIFICANT CHANGE UP
LEUKOCYTE ESTERASE UR-ACNC: NEGATIVE — SIGNIFICANT CHANGE UP
LYMPHOCYTES # BLD AUTO: 0.79 K/UL — LOW (ref 1–3.3)
LYMPHOCYTES # BLD AUTO: 23.2 % — SIGNIFICANT CHANGE UP (ref 13–44)
MAGNESIUM SERPL-MCNC: 1.8 MG/DL — SIGNIFICANT CHANGE UP (ref 1.6–2.6)
MANUAL SMEAR VERIFICATION: SIGNIFICANT CHANGE UP
MCHC RBC-ENTMCNC: 19.8 PG — LOW (ref 27–34)
MCHC RBC-ENTMCNC: 29.4 GM/DL — LOW (ref 32–36)
MCV RBC AUTO: 67.3 FL — LOW (ref 80–100)
METAMYELOCYTES # FLD: 0.9 % — HIGH (ref 0–0)
MICROCYTES BLD QL: SLIGHT — SIGNIFICANT CHANGE UP
MONOCYTES # BLD AUTO: 0.32 K/UL — SIGNIFICANT CHANGE UP (ref 0–0.9)
MONOCYTES NFR BLD AUTO: 9.3 % — SIGNIFICANT CHANGE UP (ref 2–14)
NEUTROPHILS # BLD AUTO: 2.12 K/UL — SIGNIFICANT CHANGE UP (ref 1.8–7.4)
NEUTROPHILS NFR BLD AUTO: 62 % — SIGNIFICANT CHANGE UP (ref 43–77)
NITRITE UR-MCNC: NEGATIVE — SIGNIFICANT CHANGE UP
OVALOCYTES BLD QL SMEAR: SLIGHT — SIGNIFICANT CHANGE UP
PCO2 BLDV: 39 MMHG — LOW (ref 42–55)
PH BLDV: 7.35 — SIGNIFICANT CHANGE UP (ref 7.32–7.43)
PH UR: 6 — SIGNIFICANT CHANGE UP (ref 5–8)
PLAT MORPH BLD: ABNORMAL
PLATELET # BLD AUTO: 218 K/UL — SIGNIFICANT CHANGE UP (ref 150–400)
PO2 BLDV: 33 MMHG — SIGNIFICANT CHANGE UP (ref 25–45)
POIKILOCYTOSIS BLD QL AUTO: SLIGHT — SIGNIFICANT CHANGE UP
POLYCHROMASIA BLD QL SMEAR: SLIGHT — SIGNIFICANT CHANGE UP
POTASSIUM SERPL-MCNC: 4.6 MMOL/L — SIGNIFICANT CHANGE UP (ref 3.5–5.3)
POTASSIUM SERPL-SCNC: 4.6 MMOL/L — SIGNIFICANT CHANGE UP (ref 3.5–5.3)
PROT SERPL-MCNC: 7.2 G/DL — SIGNIFICANT CHANGE UP (ref 6–8.3)
PROT UR-MCNC: NEGATIVE MG/DL — SIGNIFICANT CHANGE UP
RBC # BLD: 3.64 M/UL — LOW (ref 4.2–5.8)
RBC # FLD: 17.1 % — HIGH (ref 10.3–14.5)
RBC BLD AUTO: ABNORMAL
SAO2 % BLDV: 54.3 % — LOW (ref 67–88)
SMUDGE CELLS # BLD: PRESENT — SIGNIFICANT CHANGE UP
SODIUM SERPL-SCNC: 134 MMOL/L — LOW (ref 135–145)
SP GR SPEC: 1.01 — SIGNIFICANT CHANGE UP (ref 1–1.03)
UROBILINOGEN FLD QL: 0.2 E.U./DL — SIGNIFICANT CHANGE UP
WBC # BLD: 3.42 K/UL — LOW (ref 3.8–10.5)
WBC # FLD AUTO: 3.42 K/UL — LOW (ref 3.8–10.5)

## 2023-09-26 PROCEDURE — 99284 EMERGENCY DEPT VISIT MOD MDM: CPT

## 2023-09-26 RX ORDER — SODIUM CHLORIDE 9 MG/ML
1000 INJECTION, SOLUTION INTRAVENOUS ONCE
Refills: 0 | Status: COMPLETED | OUTPATIENT
Start: 2023-09-26 | End: 2023-09-26

## 2023-09-26 RX ORDER — INSULIN HUMAN 100 [IU]/ML
15 INJECTION, SOLUTION SUBCUTANEOUS ONCE
Refills: 0 | Status: COMPLETED | OUTPATIENT
Start: 2023-09-26 | End: 2023-09-26

## 2023-09-26 RX ADMIN — INSULIN HUMAN 15 UNIT(S): 100 INJECTION, SOLUTION SUBCUTANEOUS at 23:41

## 2023-09-26 RX ADMIN — SODIUM CHLORIDE 1000 MILLILITER(S): 9 INJECTION, SOLUTION INTRAVENOUS at 23:41

## 2023-09-26 NOTE — ED ADULT TRIAGE NOTE - NS ED TRIAGE CLINICAL UPGRADE
Deteriorating patient status - Patient was clinically upgraded due to deteriorating patient status.
pt again provided with information for [facility]; pt declines referrals for substance abuse treatment

## 2023-09-26 NOTE — ED ADULT TRIAGE NOTE - CHIEF COMPLAINT QUOTE
Patient to the ED c/o hyperglycemia. Patient states his PCP called him and told him to come to the ED after abnormal sugar on blood work. Denies abdominal pain, n/v, urinary symptoms. Endorses compliance with insulin. Blood glucose 496 in triage. AAOX4, NAD.

## 2023-09-27 VITALS
SYSTOLIC BLOOD PRESSURE: 110 MMHG | DIASTOLIC BLOOD PRESSURE: 82 MMHG | OXYGEN SATURATION: 100 % | TEMPERATURE: 98 F | HEART RATE: 90 BPM | RESPIRATION RATE: 18 BRPM

## 2023-09-27 PROBLEM — F10.10 ALCOHOL ABUSE, UNCOMPLICATED: Chronic | Status: ACTIVE | Noted: 2023-08-29

## 2023-09-27 PROBLEM — A15.9 RESPIRATORY TUBERCULOSIS UNSPECIFIED: Chronic | Status: ACTIVE | Noted: 2023-08-29

## 2023-09-27 PROCEDURE — 80053 COMPREHEN METABOLIC PANEL: CPT

## 2023-09-27 PROCEDURE — 83930 ASSAY OF BLOOD OSMOLALITY: CPT

## 2023-09-27 PROCEDURE — 82962 GLUCOSE BLOOD TEST: CPT

## 2023-09-27 PROCEDURE — 83735 ASSAY OF MAGNESIUM: CPT

## 2023-09-27 PROCEDURE — 85025 COMPLETE CBC W/AUTO DIFF WBC: CPT

## 2023-09-27 PROCEDURE — 82803 BLOOD GASES ANY COMBINATION: CPT

## 2023-09-27 PROCEDURE — 36415 COLL VENOUS BLD VENIPUNCTURE: CPT

## 2023-09-27 PROCEDURE — 82010 KETONE BODYS QUAN: CPT

## 2023-09-27 PROCEDURE — 81003 URINALYSIS AUTO W/O SCOPE: CPT

## 2023-09-27 PROCEDURE — 99283 EMERGENCY DEPT VISIT LOW MDM: CPT

## 2023-09-27 RX ORDER — SODIUM CHLORIDE 9 MG/ML
1000 INJECTION, SOLUTION INTRAVENOUS ONCE
Refills: 0 | Status: COMPLETED | OUTPATIENT
Start: 2023-09-27 | End: 2023-09-27

## 2023-09-27 RX ORDER — INSULIN HUMAN 100 [IU]/ML
15 INJECTION, SOLUTION SUBCUTANEOUS ONCE
Refills: 0 | Status: COMPLETED | OUTPATIENT
Start: 2023-09-27 | End: 2023-09-27

## 2023-09-27 RX ADMIN — INSULIN HUMAN 15 UNIT(S): 100 INJECTION, SOLUTION SUBCUTANEOUS at 01:09

## 2023-09-27 RX ADMIN — SODIUM CHLORIDE 1000 MILLILITER(S): 9 INJECTION, SOLUTION INTRAVENOUS at 01:09

## 2023-09-27 NOTE — ED PROVIDER NOTE - OBJECTIVE STATEMENT
47 yo M w PMH of alcohol use disorder (last drink 3 months ago), pulmonary TB (on RIPE, diagnosed 06/29), anemia, p/w hyperglycemia to >500 on outpt labs today. Pt has no symptoms - no sob, abd pain, n/v, cp, or dizziness. States he takes 12 U of insulin QID, last took at 6 pm.

## 2023-09-27 NOTE — ED PROVIDER NOTE - PATIENT PORTAL LINK FT
You can access the FollowMyHealth Patient Portal offered by [facility] by registering at the following website: [email or web address]. By joining [employer]’s FollowMyHealth portal, you will also be able to view your health information using other applications (apps) compatible with our system.

## 2023-09-27 NOTE — ED PROVIDER NOTE - CLINICAL SUMMARY MEDICAL DECISION MAKING FREE TEXT BOX
Hyperglycemia, asymptomatic.  No evidence of DKA or HHS on labs  Will give insulin and IVF  Serial BGM    Pts labs showing improvement. Known Anemia, close to baseline, denies bleeding. No symptoms of anemia.  Pt has f/u with endocrinology.

## 2023-09-27 NOTE — ED PROVIDER NOTE - NSFOLLOWUPINSTRUCTIONS_ED_ALL_ED_FT
Follow up with your primary medical doctor as soon as possible.    Return to the emergency department if your symptoms worsen or if you develop new symptoms.  If you have any problems with followup, please call the ED Referral Coordinator at [phone].    Hyperglycemia    Hyperglycemia occurs when the glucose (sugar) level in your blood is too high. Symptoms include increased urination, increased thirst, a dry mouth, or changes in appetite. If started on a medication, take exactly as prescribed by your health care professional. Maintain a healthy lifestyle and follow up with your primary care physician.    SEEK IMMEDIATE MEDICAL CARE IF YOU HAVE ANY OF THE FOLLOWING SYMPTOMS: shortness of breath, change in mental status, nausea or vomiting, fruity smell to your breath, or any signs of dehydration.

## 2023-09-29 DIAGNOSIS — Z86.2 PERSONAL HISTORY OF DISEASES OF THE BLOOD AND BLOOD-FORMING ORGANS AND CERTAIN DISORDERS INVOLVING THE IMMUNE MECHANISM: ICD-10-CM

## 2023-09-29 DIAGNOSIS — Z79.4 LONG TERM (CURRENT) USE OF INSULIN: ICD-10-CM

## 2023-09-29 DIAGNOSIS — R73.9 HYPERGLYCEMIA, UNSPECIFIED: ICD-10-CM

## 2023-09-29 DIAGNOSIS — Z79.84 LONG TERM (CURRENT) USE OF ORAL HYPOGLYCEMIC DRUGS: ICD-10-CM

## 2023-10-27 LAB
CULTURE RESULTS: ABNORMAL
CULTURE RESULTS: ABNORMAL
NIGHT BLUE STAIN TISS: ABNORMAL
NIGHT BLUE STAIN TISS: ABNORMAL
SPECIMEN SOURCE: SIGNIFICANT CHANGE UP
SPECIMEN SOURCE: SIGNIFICANT CHANGE UP

## 2023-12-10 ENCOUNTER — EMERGENCY (EMERGENCY)
Facility: HOSPITAL | Age: 48
LOS: 1 days | Discharge: HOME | End: 2023-12-10
Attending: STUDENT IN AN ORGANIZED HEALTH CARE EDUCATION/TRAINING PROGRAM | Admitting: STUDENT IN AN ORGANIZED HEALTH CARE EDUCATION/TRAINING PROGRAM
Payer: COMMERCIAL

## 2023-12-10 VITALS
DIASTOLIC BLOOD PRESSURE: 89 MMHG | RESPIRATION RATE: 17 BRPM | OXYGEN SATURATION: 98 % | WEIGHT: 139.99 LBS | SYSTOLIC BLOOD PRESSURE: 143 MMHG | TEMPERATURE: 98 F | HEART RATE: 105 BPM

## 2023-12-10 VITALS
DIASTOLIC BLOOD PRESSURE: 76 MMHG | RESPIRATION RATE: 16 BRPM | OXYGEN SATURATION: 99 % | SYSTOLIC BLOOD PRESSURE: 136 MMHG | HEART RATE: 90 BPM

## 2023-12-10 LAB
ANION GAP SERPL CALC-SCNC: 10 MMOL/L — SIGNIFICANT CHANGE UP (ref 5–17)
ANION GAP SERPL CALC-SCNC: 10 MMOL/L — SIGNIFICANT CHANGE UP (ref 5–17)
ANISOCYTOSIS BLD QL: SIGNIFICANT CHANGE UP
ANISOCYTOSIS BLD QL: SIGNIFICANT CHANGE UP
B-OH-BUTYR SERPL-SCNC: 0.1 MMOL/L — SIGNIFICANT CHANGE UP
B-OH-BUTYR SERPL-SCNC: 0.1 MMOL/L — SIGNIFICANT CHANGE UP
BASOPHILS # BLD AUTO: 0 K/UL — SIGNIFICANT CHANGE UP (ref 0–0.2)
BASOPHILS # BLD AUTO: 0 K/UL — SIGNIFICANT CHANGE UP (ref 0–0.2)
BASOPHILS NFR BLD AUTO: 0 % — SIGNIFICANT CHANGE UP (ref 0–2)
BASOPHILS NFR BLD AUTO: 0 % — SIGNIFICANT CHANGE UP (ref 0–2)
BUN SERPL-MCNC: 5 MG/DL — LOW (ref 7–23)
BUN SERPL-MCNC: 5 MG/DL — LOW (ref 7–23)
CALCIUM SERPL-MCNC: 9 MG/DL — SIGNIFICANT CHANGE UP (ref 8.4–10.5)
CALCIUM SERPL-MCNC: 9 MG/DL — SIGNIFICANT CHANGE UP (ref 8.4–10.5)
CHLORIDE SERPL-SCNC: 110 MMOL/L — HIGH (ref 96–108)
CHLORIDE SERPL-SCNC: 110 MMOL/L — HIGH (ref 96–108)
CO2 SERPL-SCNC: 24 MMOL/L — SIGNIFICANT CHANGE UP (ref 22–31)
CO2 SERPL-SCNC: 24 MMOL/L — SIGNIFICANT CHANGE UP (ref 22–31)
CREAT SERPL-MCNC: 0.5 MG/DL — SIGNIFICANT CHANGE UP (ref 0.5–1.3)
CREAT SERPL-MCNC: 0.5 MG/DL — SIGNIFICANT CHANGE UP (ref 0.5–1.3)
EGFR: 127 ML/MIN/1.73M2 — SIGNIFICANT CHANGE UP
EGFR: 127 ML/MIN/1.73M2 — SIGNIFICANT CHANGE UP
EOSINOPHIL # BLD AUTO: 0.13 K/UL — SIGNIFICANT CHANGE UP (ref 0–0.5)
EOSINOPHIL # BLD AUTO: 0.13 K/UL — SIGNIFICANT CHANGE UP (ref 0–0.5)
EOSINOPHIL NFR BLD AUTO: 4.6 % — SIGNIFICANT CHANGE UP (ref 0–6)
EOSINOPHIL NFR BLD AUTO: 4.6 % — SIGNIFICANT CHANGE UP (ref 0–6)
GIANT PLATELETS BLD QL SMEAR: PRESENT — SIGNIFICANT CHANGE UP
GIANT PLATELETS BLD QL SMEAR: PRESENT — SIGNIFICANT CHANGE UP
GLUCOSE SERPL-MCNC: 218 MG/DL — HIGH (ref 70–99)
GLUCOSE SERPL-MCNC: 218 MG/DL — HIGH (ref 70–99)
HCT VFR BLD CALC: 25.7 % — LOW (ref 39–50)
HCT VFR BLD CALC: 25.7 % — LOW (ref 39–50)
HGB BLD-MCNC: 7 G/DL — CRITICAL LOW (ref 13–17)
HGB BLD-MCNC: 7 G/DL — CRITICAL LOW (ref 13–17)
HYPOCHROMIA BLD QL: SIGNIFICANT CHANGE UP
HYPOCHROMIA BLD QL: SIGNIFICANT CHANGE UP
LYMPHOCYTES # BLD AUTO: 1.04 K/UL — SIGNIFICANT CHANGE UP (ref 1–3.3)
LYMPHOCYTES # BLD AUTO: 1.04 K/UL — SIGNIFICANT CHANGE UP (ref 1–3.3)
LYMPHOCYTES # BLD AUTO: 38 % — SIGNIFICANT CHANGE UP (ref 13–44)
LYMPHOCYTES # BLD AUTO: 38 % — SIGNIFICANT CHANGE UP (ref 13–44)
MANUAL SMEAR VERIFICATION: SIGNIFICANT CHANGE UP
MANUAL SMEAR VERIFICATION: SIGNIFICANT CHANGE UP
MCHC RBC-ENTMCNC: 17.2 PG — LOW (ref 27–34)
MCHC RBC-ENTMCNC: 17.2 PG — LOW (ref 27–34)
MCHC RBC-ENTMCNC: 27.2 GM/DL — LOW (ref 32–36)
MCHC RBC-ENTMCNC: 27.2 GM/DL — LOW (ref 32–36)
MCV RBC AUTO: 63.3 FL — LOW (ref 80–100)
MCV RBC AUTO: 63.3 FL — LOW (ref 80–100)
MICROCYTES BLD QL: SIGNIFICANT CHANGE UP
MICROCYTES BLD QL: SIGNIFICANT CHANGE UP
MONOCYTES # BLD AUTO: 0.13 K/UL — SIGNIFICANT CHANGE UP (ref 0–0.9)
MONOCYTES # BLD AUTO: 0.13 K/UL — SIGNIFICANT CHANGE UP (ref 0–0.9)
MONOCYTES NFR BLD AUTO: 4.6 % — SIGNIFICANT CHANGE UP (ref 2–14)
MONOCYTES NFR BLD AUTO: 4.6 % — SIGNIFICANT CHANGE UP (ref 2–14)
NEUTROPHILS # BLD AUTO: 1.45 K/UL — LOW (ref 1.8–7.4)
NEUTROPHILS # BLD AUTO: 1.45 K/UL — LOW (ref 1.8–7.4)
NEUTROPHILS NFR BLD AUTO: 52.8 % — SIGNIFICANT CHANGE UP (ref 43–77)
NEUTROPHILS NFR BLD AUTO: 52.8 % — SIGNIFICANT CHANGE UP (ref 43–77)
OVALOCYTES BLD QL SMEAR: SLIGHT — SIGNIFICANT CHANGE UP
OVALOCYTES BLD QL SMEAR: SLIGHT — SIGNIFICANT CHANGE UP
PLAT MORPH BLD: ABNORMAL
PLAT MORPH BLD: ABNORMAL
PLATELET # BLD AUTO: 158 K/UL — SIGNIFICANT CHANGE UP (ref 150–400)
PLATELET # BLD AUTO: 158 K/UL — SIGNIFICANT CHANGE UP (ref 150–400)
POIKILOCYTOSIS BLD QL AUTO: SLIGHT — SIGNIFICANT CHANGE UP
POIKILOCYTOSIS BLD QL AUTO: SLIGHT — SIGNIFICANT CHANGE UP
POLYCHROMASIA BLD QL SMEAR: SLIGHT — SIGNIFICANT CHANGE UP
POLYCHROMASIA BLD QL SMEAR: SLIGHT — SIGNIFICANT CHANGE UP
POTASSIUM SERPL-MCNC: 4.1 MMOL/L — SIGNIFICANT CHANGE UP (ref 3.5–5.3)
POTASSIUM SERPL-MCNC: 4.1 MMOL/L — SIGNIFICANT CHANGE UP (ref 3.5–5.3)
POTASSIUM SERPL-SCNC: 4.1 MMOL/L — SIGNIFICANT CHANGE UP (ref 3.5–5.3)
POTASSIUM SERPL-SCNC: 4.1 MMOL/L — SIGNIFICANT CHANGE UP (ref 3.5–5.3)
RBC # BLD: 4.06 M/UL — LOW (ref 4.2–5.8)
RBC # BLD: 4.06 M/UL — LOW (ref 4.2–5.8)
RBC # FLD: 18.6 % — HIGH (ref 10.3–14.5)
RBC # FLD: 18.6 % — HIGH (ref 10.3–14.5)
RBC BLD AUTO: ABNORMAL
RBC BLD AUTO: ABNORMAL
SCHISTOCYTES BLD QL AUTO: SLIGHT — SIGNIFICANT CHANGE UP
SCHISTOCYTES BLD QL AUTO: SLIGHT — SIGNIFICANT CHANGE UP
SODIUM SERPL-SCNC: 144 MMOL/L — SIGNIFICANT CHANGE UP (ref 135–145)
SODIUM SERPL-SCNC: 144 MMOL/L — SIGNIFICANT CHANGE UP (ref 135–145)
WBC # BLD: 2.74 K/UL — LOW (ref 3.8–10.5)
WBC # BLD: 2.74 K/UL — LOW (ref 3.8–10.5)
WBC # FLD AUTO: 2.74 K/UL — LOW (ref 3.8–10.5)
WBC # FLD AUTO: 2.74 K/UL — LOW (ref 3.8–10.5)

## 2023-12-10 PROCEDURE — 36415 COLL VENOUS BLD VENIPUNCTURE: CPT

## 2023-12-10 PROCEDURE — 85025 COMPLETE CBC W/AUTO DIFF WBC: CPT

## 2023-12-10 PROCEDURE — 80048 BASIC METABOLIC PNL TOTAL CA: CPT

## 2023-12-10 PROCEDURE — 99283 EMERGENCY DEPT VISIT LOW MDM: CPT

## 2023-12-10 PROCEDURE — 82962 GLUCOSE BLOOD TEST: CPT

## 2023-12-10 PROCEDURE — 82010 KETONE BODYS QUAN: CPT

## 2023-12-10 PROCEDURE — 99284 EMERGENCY DEPT VISIT MOD MDM: CPT

## 2023-12-10 RX ORDER — SODIUM CHLORIDE 9 MG/ML
1000 INJECTION INTRAMUSCULAR; INTRAVENOUS; SUBCUTANEOUS ONCE
Refills: 0 | Status: COMPLETED | OUTPATIENT
Start: 2023-12-10 | End: 2023-12-10

## 2023-12-10 RX ADMIN — SODIUM CHLORIDE 1000 MILLILITER(S): 9 INJECTION INTRAMUSCULAR; INTRAVENOUS; SUBCUTANEOUS at 12:14

## 2023-12-10 NOTE — ED PROVIDER NOTE - ATTENDING APP SHARED VISIT CONTRIBUTION OF CARE
The pt is a 49 y/o M, who presents to ED c/o "high sugar - 300s", no symptoms. will check labs, ua, reassess.

## 2023-12-10 NOTE — ED ADULT NURSE NOTE - OBJECTIVE STATEMENT
Pt arrives for hyperglycemia as monitored at home. Pt reports bilateral arm tingling without numbness or weakness. Denies N/V/D/fever/chills. Pt alert, oriented, and ambulatory at time of assessment. No tachypnea noted.

## 2023-12-10 NOTE — ED PROVIDER NOTE - PATIENT PORTAL LINK FT
You can access the FollowMyHealth Patient Portal offered by [facility] by registering at the following website: [email or web address]. By joining [employer]’s FollowMyHealth portal, you will also be able to view your health information using other applications (apps) compatible with our system. You can access the FollowMyHealth Patient Portal offered by [facility] by registering at the following website: [email or web address]. By joining [employer]’s FollowMyHealth portal, you will also be able to view your health information using other applications (apps) compatible with our system.

## 2023-12-10 NOTE — ED PROVIDER NOTE - NSFOLLOWUPINSTRUCTIONS_ED_ALL_ED_FT
TOME SUH MEDICAMENTO SEGÚN LO RECETA SUH PMD. WHITLEY MUCHOS LÍQUIDOS, ASIF SEGUIMIENTO CON SUH PMD    Hyperglycemia  La hiperglucemia se produce cuando el nivel de azúcar (glucosa) en la palmira es demasiado alto. Un nivel alto de azúcar en la palmira puede presentarse tanto en personas que tienen diabetes betty en las que no la tienen. El nivel de azúcar en la palmira puede elevarse rápidamente. Puede ser alexandra emergencia.  ¿Cuáles son las causas?  Si tiene diabetes, el nivel alto de azúcar en la palmira puede deberse a lo siguiente:  Medicamentos que aumentan el azúcar en la palmira o que afectan el control de la diabetes.  Disminuir la actividad física.  Vidor en exceso.  Estar enfermo o lesionado, o tener alexandra infección.  Someterse a alexandra cirugía.  Estrés.  No administrarse la insulina suficiente (si usa).  El nivel alto de azúcar en la palmira puede deberse a que usted tiene diabetes que aún no se le ha diagnosticado.  Si no tiene diabetes, el nivel alto de azúcar en la palmira puede deberse a lo siguiente:  Ciertos medicamentos.  Estrés.  Alexandra enfermedad grave.  Alexandra infección.  Someterse a alexandra cirugía.  Enfermedades del páncreas.  ¿Qué incrementa el riesgo?  Es más probable que alexandra persona con factores de riesgo de diabetes tenga esta afección, por ejemplo:  Tener un familiar con diabetes.  Ciertas afecciones en las que el sistema de defensa del cuerpo (sistema inmunitario) se ataca a sí mismo. Estos se conocen betty trastornos autoinmunitarios.  Tener sobrepeso.  Ser sedentario.  Tener alexandra afección llamada resistencia a la insulina.  Tener antecedentes de lo siguiente:  Prediabetes.  Diabetes tristan el embarazo.  Síndrome del ovario poliquístico (SOP).  ¿Cuáles son los signos o síntomas?  Es posible que esta afección no cause síntomas. Si tiene síntomas, pueden incluir los siguientes:  Estar más sediento que lo habitual.  Necesidad de orinar con mayor frecuencia que lo normal.  Hambre.  Mucho cansancio.  Visión borrosa.  A medida que la afección empeora, puede presentar otros síntomas, betty los siguientes:  Sequedad en la boca.  Dolor en el vientre (abdomen).  Falta de hambre (inapetencia).  Aliento con olor a fruta.  Debilidad.  Pérdida de peso no planificada.  Hormigueo o adormecimiento en las jagruti o los pies.  Dolor de cecy.  Smith o moretones que tardan en curarse.  ¿Cómo se trata?  El tratamiento depende de la causa de la afección. El tratamiento puede incluir:  Sebastian medicamentos para controlar los niveles de azúcar en la palmira.  Cambiar los medicamentos o la dosis si recibe insulina u otros medicamentos para la diabetes.  Cambios en el estilo de sandip. Pueden incluir:  Hacer más ejercicio.  Consumir alimentos más saludables.  Bajar de peso.  Tratar alexandra enfermedad o afección.  Controlarse el nivel de azúcar en la palmira con mayor frecuencia.  Suspender o reducir los medicamentos con corticoesteroides.  Si la afección es muy grave, deberá recibir tratamiento en el hospital.  Siga estas instrucciones en suh casa:  Instrucciones generales  Use los medicamentos de venta agnes y los recetados solamente betty se lo haya indicado el médico.  No fume ni consuma ningún producto que contenga nicotina o tabaco. Si necesita ayuda para dejar de fumar, consulte al médico.  Si lisa alcohol:  Limite la cantidad que lisa a lo siguiente:  De 0 a 1 medida por día para las mujeres que no están embarazadas.  De 0 a 2 medidas por día para los hombres.  Sepa cuánta cantidad de alcohol hay en las bebidas. En los [place], alexandra medida equivale a alexandra botella de cerveza de 12 oz (355 ml), un vaso de vino de 5 oz (148 ml) o un vaso de alexandra bebida alcohólica de karen graduación de 1½ oz (44 ml).  Controle el estrés. Si necesita ayuda para lograrlo, consulte al médico.  Asif los ejercicios betty se lo haya indicado el médico.  Cumpla con todas las visitas de seguimiento.  Comida y bebida  Mantenga un peso saludable.  Asegúrese de beber suficiente líquido cuando:  Actividad física.  Se enferma.  El clima es caluroso.  Whitley suficiente líquido para mantener el pis (orina) de color amarillo pálido.  Si usted tiene diabetes:  Conozca los síntomas de nivel alto de azúcar en la palmira.  Siga el plan de control de la diabetes betty se lo haya indicado el médico. Asegúrese de hacer lo siguiente:  Aplíquese la insulina y tome los medicamentos betty se lo hayan indicado.  Siga suh plan de ejercicio.  Siga suh plan de comidas. Coma a horario. No omita comidas.  Contrólese el nivel de azúcar en la palmira con la frecuencia que le hayan indicado. Contrólese antes y después de hacer ejercicio. Si hace ejercicio tristan más tiempo o de manera diferente, contrólese el azúcar en la palmira con mayor frecuencia.  Siga el plan para los días de enfermedad cuando no pueda comer ni beber normalmente. Elabore logan plan de antemano con el médico.  Comparta suh plan de control de la diabetes con heidi compañeros de trabajo y de la escuela, y con las personas con las que convive.  Contrólese las cetonas en la orina cuando esté enfermo y betty se lo haya indicado el médico.  Lleve consigo alexandra tarjeta, o use alexandra medalla o un brazalete que indique que tiene diabetes.  Dónde buscar más información  American Diabetes Association (Asociación Estad[place] de la Diabetes): www.diabetes.org  Comuníquese con un médico si:  El nivel de azúcar en la palmira es igual o mayor que 240 mg/dl (13.3 mmol/dl) tristan 2 días seguidos.  Tiene problemas para mantener el nivel de azúcar en la palmira dentro del intervalo deseado.  Tiene presión arterial karen con frecuencia.  Tiene signos de enfermedad, por ejemplo:  Sensación de que va a vomitar (náuseas).  Vómitos.  Fiebre.  Solicite ayuda de inmediato si:  El monitor de azúcar en la palmira indica un nivel “alto” incluso cuando se está administrando insulina.  Tiene dificultad para respirar.  Tiene cambios en la manera de sentirse, pensar o actuar (estado mental).  Siente ganas de vomitar y la sensación no desaparece.  No puede dejar de vomitar.  Estos síntomas pueden indicar alexandra emergencia. Solicite atención médica de inmediato. Comuníquese con el servicio de emergencias de suh localidad (911 en los [place]).  No espere a negro si los síntomas desaparecen.  No conduzca por heidi propios medios hasta el hospital.  Resumen  La hiperglucemia se produce cuando el nivel de azúcar (glucosa) en la palmira es demasiado alto.  Un nivel alto de azúcar en la palmira puede presentarse tanto en personas que tienen diabetes betty en las que no la tienen.  Asegúrese de beber alexandra cantidad suficiente de líquidos y de seguir suh plan de comidas. Ejercítese con la frecuencia que le haya indicado el médico.  Comuníquese con el médico si tiene problemas para mantener el nivel de azúcar en la palmira dentro del intervalo deseado. TOME SUH MEDICAMENTO SEGÚN LO RECETA SUH PMD. WHITLEY MUCHOS LÍQUIDOS, ASIF SEGUIMIENTO CON SUH PMD    Hyperglycemia  La hiperglucemia se produce cuando el nivel de azúcar (glucosa) en la palmira es demasiado alto. Un nivel alto de azúcar en la palmira puede presentarse tanto en personas que tienen diabetes betty en las que no la tienen. El nivel de azúcar en la palmira puede elevarse rápidamente. Puede ser alexandra emergencia.  ¿Cuáles son las causas?  Si tiene diabetes, el nivel alto de azúcar en la palmira puede deberse a lo siguiente:  Medicamentos que aumentan el azúcar en la palmira o que afectan el control de la diabetes.  Disminuir la actividad física.  Kelley en exceso.  Estar enfermo o lesionado, o tener alexandra infección.  Someterse a alexandra cirugía.  Estrés.  No administrarse la insulina suficiente (si usa).  El nivel alto de azúcar en la palmira puede deberse a que usted tiene diabetes que aún no se le ha diagnosticado.  Si no tiene diabetes, el nivel alto de azúcar en la palmira puede deberse a lo siguiente:  Ciertos medicamentos.  Estrés.  Alexandra enfermedad grave.  Alexandra infección.  Someterse a alexandra cirugía.  Enfermedades del páncreas.  ¿Qué incrementa el riesgo?  Es más probable que alexandra persona con factores de riesgo de diabetes tenga esta afección, por ejemplo:  Tener un familiar con diabetes.  Ciertas afecciones en las que el sistema de defensa del cuerpo (sistema inmunitario) se ataca a sí mismo. Estos se conocen betty trastornos autoinmunitarios.  Tener sobrepeso.  Ser sedentario.  Tener alexandra afección llamada resistencia a la insulina.  Tener antecedentes de lo siguiente:  Prediabetes.  Diabetes tristan el embarazo.  Síndrome del ovario poliquístico (SOP).  ¿Cuáles son los signos o síntomas?  Es posible que esta afección no cause síntomas. Si tiene síntomas, pueden incluir los siguientes:  Estar más sediento que lo habitual.  Necesidad de orinar con mayor frecuencia que lo normal.  Hambre.  Mucho cansancio.  Visión borrosa.  A medida que la afección empeora, puede presentar otros síntomas, betty los siguientes:  Sequedad en la boca.  Dolor en el vientre (abdomen).  Falta de hambre (inapetencia).  Aliento con olor a fruta.  Debilidad.  Pérdida de peso no planificada.  Hormigueo o adormecimiento en las jagruti o los pies.  Dolor de cecy.  Smith o moretones que tardan en curarse.  ¿Cómo se trata?  El tratamiento depende de la causa de la afección. El tratamiento puede incluir:  Sebastian medicamentos para controlar los niveles de azúcar en la palmira.  Cambiar los medicamentos o la dosis si recibe insulina u otros medicamentos para la diabetes.  Cambios en el estilo de sandip. Pueden incluir:  Hacer más ejercicio.  Consumir alimentos más saludables.  Bajar de peso.  Tratar alexandra enfermedad o afección.  Controlarse el nivel de azúcar en la palmira con mayor frecuencia.  Suspender o reducir los medicamentos con corticoesteroides.  Si la afección es muy grave, deberá recibir tratamiento en el hospital.  Siga estas instrucciones en suh casa:  Instrucciones generales  Use los medicamentos de venta agnes y los recetados solamente betty se lo haya indicado el médico.  No fume ni consuma ningún producto que contenga nicotina o tabaco. Si necesita ayuda para dejar de fumar, consulte al médico.  Si lisa alcohol:  Limite la cantidad que lisa a lo siguiente:  De 0 a 1 medida por día para las mujeres que no están embarazadas.  De 0 a 2 medidas por día para los hombres.  Sepa cuánta cantidad de alcohol hay en las bebidas. En [place], alexandra medida equivale a alexandra botella de cerveza de 12 oz (355 ml), un vaso de vino de 5 oz (148 ml) o un vaso de alexandra bebida alcohólica de karen graduación de 1½ oz (44 ml).  Controle el estrés. Si necesita ayuda para lograrlo, consulte al médico.  Asif los ejercicios betty se lo haya indicado el médico.  Cumpla con todas las visitas de seguimiento.  Comida y bebida  Mantenga un peso saludable.  Asegúrese de beber suficiente líquido cuando:  Actividad física.  Se enferma.  El clima es caluroso.  Whitley suficiente líquido para mantener el pis (orina) de color amarillo pálido.  Si usted tiene diabetes:  Conozca los síntomas de nivel alto de azúcar en la palmira.  Siga el plan de control de la diabetes betty se lo haya indicado el médico. Asegúrese de hacer lo siguiente:  Aplíquese la insulina y tome los medicamentos betty se lo hayan indicado.  Siga suh plan de ejercicio.  Siga suh plan de comidas. Coma a horario. No omita comidas.  Contrólese el nivel de azúcar en la palmira con la frecuencia que le hayan indicado. Contrólese antes y después de hacer ejercicio. Si hace ejercicio tristan más tiempo o de manera diferente, contrólese el azúcar en la palmira con mayor frecuencia.  Siga el plan para los días de enfermedad cuando no pueda comer ni beber normalmente. Elabore logan plan de antemano con el médico.  Comparta suh plan de control de la diabetes con heidi compañeros de trabajo y de la escuela, y con las personas con las que convive.  Contrólese las cetonas en la orina cuando esté enfermo y betty se lo haya indicado el médico.  Lleve consigo alexandra tarjeta, o use alexandra medalla o un brazalete que indique que tiene diabetes.  Dónde buscar más información  American Diabetes Association (Asociación Estadounidense de la Diabetes): www.diabetes.org  Comuníquese con un médico si:  El nivel de azúcar en la palmira es igual o mayor que 240 mg/dl (13.3 mmol/dl) tristan 2 días seguidos.  Tiene problemas para mantener el nivel de azúcar en la palmira dentro del intervalo deseado.  Tiene presión arterial karen con frecuencia.  Tiene signos de enfermedad, por ejemplo:  Sensación de que va a vomitar (náuseas).  Vómitos.  Fiebre.  Solicite ayuda de inmediato si:  El monitor de azúcar en la palmira indica un nivel “alto” incluso cuando se está administrando insulina.  Tiene dificultad para respirar.  Tiene cambios en la manera de sentirse, pensar o actuar (estado mental).  Siente ganas de vomitar y la sensación no desaparece.  No puede dejar de vomitar.  Estos síntomas pueden indicar alexandra emergencia. Solicite atención médica de inmediato. Comuníquese con el servicio de emergencias de suh localidad (911 en los [place]).  No espere a negro si los síntomas desaparecen.  No conduzca por heidi propios medios hasta el hospital.  Resumen  La hiperglucemia se produce cuando el nivel de azúcar (glucosa) en la palmira es demasiado alto.  Un nivel alto de azúcar en la palmira puede presentarse tanto en personas que tienen diabetes betty en las que no la tienen.  Asegúrese de beber alexandra cantidad suficiente de líquidos y de seguir suh plan de comidas. Ejercítese con la frecuencia que le haya indicado el médico.  Comuníquese con el médico si tiene problemas para mantener el nivel de azúcar en la palmira dentro del intervalo deseado.

## 2023-12-10 NOTE — ED ADULT NURSE NOTE - NSFALLUNIVINTERV_ED_ALL_ED
Bed/Stretcher in lowest position, wheels locked, appropriate side rails in place/Call bell, personal items and telephone in reach/Instruct patient to call for assistance before getting out of bed/chair/stretcher/Non-slip footwear applied when patient is off stretcher/[place] to call system/Physically safe environment - no spills, clutter or unnecessary equipment/Purposeful proactive rounding/Room/bathroom lighting operational, light cord in reach Bed/Stretcher in lowest position, wheels locked, appropriate side rails in place/Call bell, personal items and telephone in reach/Instruct patient to call for assistance before getting out of bed/chair/stretcher/Non-slip footwear applied when patient is off stretcher/[place] to call system/Physically safe environment - no spills, clutter or unnecessary equipment/Purposeful proactive rounding/Room/bathroom lighting operational, light cord in reach

## 2023-12-10 NOTE — ED PROVIDER NOTE - CLINICAL SUMMARY MEDICAL DECISION MAKING FREE TEXT BOX
pt w/hx of diabetes, reports compliance w/insulin, c/o elevated FS (300s), no n/v/d/abd pain/polyuria/polydipsia, well appearing, hemodynamically stable fs 218 in ed, labs at baseline (hgb 7), given ivf, no emergent indication for any meds at this time, to f/u w/pmd, pt understands and agrees w/plan, strict return precautions given

## 2023-12-10 NOTE — ED PROVIDER NOTE - OBJECTIVE STATEMENT
The pt is a 49 y/o M, who presents to ED c/o "high sugar - 300s". Takes insulin, claims compliance, states that his FS has been in 300s. Denies cp, sob, n/v/d, abd pain, h/a, dizziness, polyuria, polydipsia, fevers, chills. The pt is a 47 y/o M, who presents to ED c/o "high sugar - 300s". Takes insulin, claims compliance, states that his FS has been in 300s. Denies cp, sob, n/v/d, abd pain, h/a, dizziness, polyuria, polydipsia, fevers, chills.

## 2023-12-12 DIAGNOSIS — R73.9 HYPERGLYCEMIA, UNSPECIFIED: ICD-10-CM

## 2023-12-12 DIAGNOSIS — Z79.4 LONG TERM (CURRENT) USE OF INSULIN: ICD-10-CM

## 2023-12-12 DIAGNOSIS — Z79.84 LONG TERM (CURRENT) USE OF ORAL HYPOGLYCEMIC DRUGS: ICD-10-CM

## 2023-12-12 DIAGNOSIS — E11.65 TYPE 2 DIABETES MELLITUS WITH HYPERGLYCEMIA: ICD-10-CM

## 2024-01-18 ENCOUNTER — INPATIENT (INPATIENT)
Facility: HOSPITAL | Age: 49
LOS: 0 days | Discharge: HOME | DRG: 194 | End: 2024-01-19
Attending: GENERAL ACUTE CARE HOSPITAL | Admitting: STUDENT IN AN ORGANIZED HEALTH CARE EDUCATION/TRAINING PROGRAM
Payer: COMMERCIAL

## 2024-01-18 VITALS
HEART RATE: 101 BPM | RESPIRATION RATE: 16 BRPM | WEIGHT: 149.91 LBS | OXYGEN SATURATION: 96 % | TEMPERATURE: 98 F | SYSTOLIC BLOOD PRESSURE: 160 MMHG | HEIGHT: 66 IN | DIASTOLIC BLOOD PRESSURE: 85 MMHG

## 2024-01-18 DIAGNOSIS — J18.9 PNEUMONIA, UNSPECIFIED ORGANISM: ICD-10-CM

## 2024-01-18 DIAGNOSIS — A15.9 RESPIRATORY TUBERCULOSIS UNSPECIFIED: ICD-10-CM

## 2024-01-18 DIAGNOSIS — D64.9 ANEMIA, UNSPECIFIED: ICD-10-CM

## 2024-01-18 DIAGNOSIS — E80.6 OTHER DISORDERS OF BILIRUBIN METABOLISM: ICD-10-CM

## 2024-01-18 DIAGNOSIS — D69.6 THROMBOCYTOPENIA, UNSPECIFIED: ICD-10-CM

## 2024-01-18 DIAGNOSIS — E11.9 TYPE 2 DIABETES MELLITUS WITHOUT COMPLICATIONS: ICD-10-CM

## 2024-01-18 DIAGNOSIS — Z29.9 ENCOUNTER FOR PROPHYLACTIC MEASURES, UNSPECIFIED: ICD-10-CM

## 2024-01-18 DIAGNOSIS — E87.1 HYPO-OSMOLALITY AND HYPONATREMIA: ICD-10-CM

## 2024-01-18 LAB
ALBUMIN SERPL ELPH-MCNC: 4.3 G/DL — SIGNIFICANT CHANGE UP (ref 3.3–5)
ALP SERPL-CCNC: 142 U/L — HIGH (ref 40–120)
ALT FLD-CCNC: 16 U/L — SIGNIFICANT CHANGE UP (ref 10–45)
ANION GAP SERPL CALC-SCNC: 15 MMOL/L — SIGNIFICANT CHANGE UP (ref 5–17)
ANISOCYTOSIS BLD QL: SLIGHT — SIGNIFICANT CHANGE UP
APPEARANCE UR: CLEAR — SIGNIFICANT CHANGE UP
APTT BLD: 36.9 SEC — HIGH (ref 24.5–35.6)
AST SERPL-CCNC: 31 U/L — SIGNIFICANT CHANGE UP (ref 10–40)
BACTERIA # UR AUTO: NEGATIVE /HPF — SIGNIFICANT CHANGE UP
BASOPHILS # BLD AUTO: 0.11 K/UL — SIGNIFICANT CHANGE UP (ref 0–0.2)
BASOPHILS NFR BLD AUTO: 2.6 % — HIGH (ref 0–2)
BILIRUB DIRECT SERPL-MCNC: 0.5 MG/DL — HIGH (ref 0–0.3)
BILIRUB SERPL-MCNC: 1.4 MG/DL — HIGH (ref 0.2–1.2)
BILIRUB UR-MCNC: ABNORMAL
BUN SERPL-MCNC: 10 MG/DL — SIGNIFICANT CHANGE UP (ref 7–23)
CALCIUM SERPL-MCNC: 9 MG/DL — SIGNIFICANT CHANGE UP (ref 8.4–10.5)
CHLORIDE SERPL-SCNC: 101 MMOL/L — SIGNIFICANT CHANGE UP (ref 96–108)
CO2 SERPL-SCNC: 17 MMOL/L — LOW (ref 22–31)
COLOR SPEC: SIGNIFICANT CHANGE UP
CREAT ?TM UR-MCNC: 20 MG/DL — SIGNIFICANT CHANGE UP
CREAT SERPL-MCNC: 0.51 MG/DL — SIGNIFICANT CHANGE UP (ref 0.5–1.3)
CULTURE RESULTS: ABNORMAL
DIFF PNL FLD: NEGATIVE — SIGNIFICANT CHANGE UP
EGFR: 126 ML/MIN/1.73M2 — SIGNIFICANT CHANGE UP
EOSINOPHIL # BLD AUTO: 0.11 K/UL — SIGNIFICANT CHANGE UP (ref 0–0.5)
EOSINOPHIL NFR BLD AUTO: 2.6 % — SIGNIFICANT CHANGE UP (ref 0–6)
FIBRINOGEN PPP-MCNC: 311 MG/DL — SIGNIFICANT CHANGE UP (ref 200–445)
GIANT PLATELETS BLD QL SMEAR: PRESENT — SIGNIFICANT CHANGE UP
GLUCOSE BLDC GLUCOMTR-MCNC: 199 MG/DL — HIGH (ref 70–99)
GLUCOSE SERPL-MCNC: 114 MG/DL — HIGH (ref 70–99)
GLUCOSE UR QL: >=1000 MG/DL
GRAM STN FLD: ABNORMAL
HAPTOGLOB SERPL-MCNC: 118 MG/DL — SIGNIFICANT CHANGE UP (ref 34–200)
HCT VFR BLD CALC: 32.2 % — LOW (ref 39–50)
HGB BLD-MCNC: 9.2 G/DL — LOW (ref 13–17)
HYPOCHROMIA BLD QL: SIGNIFICANT CHANGE UP
INR BLD: 1.15 — SIGNIFICANT CHANGE UP (ref 0.85–1.18)
KETONES UR-MCNC: 40 MG/DL
LDH SERPL L TO P-CCNC: 194 U/L — SIGNIFICANT CHANGE UP (ref 50–242)
LEGIONELLA AG UR QL: NEGATIVE — SIGNIFICANT CHANGE UP
LEUKOCYTE ESTERASE UR-ACNC: NEGATIVE — SIGNIFICANT CHANGE UP
LIDOCAIN IGE QN: 102 U/L — HIGH (ref 7–60)
LYMPHOCYTES # BLD AUTO: 0.44 K/UL — LOW (ref 1–3.3)
LYMPHOCYTES # BLD AUTO: 10.5 % — LOW (ref 13–44)
MACROCYTES BLD QL: SLIGHT — SIGNIFICANT CHANGE UP
MANUAL SMEAR VERIFICATION: SIGNIFICANT CHANGE UP
MCHC RBC-ENTMCNC: 19.8 PG — LOW (ref 27–34)
MCHC RBC-ENTMCNC: 28.6 GM/DL — LOW (ref 32–36)
MCV RBC AUTO: 69.2 FL — LOW (ref 80–100)
MICROCYTES BLD QL: SLIGHT — SIGNIFICANT CHANGE UP
MONOCYTES # BLD AUTO: 0.04 K/UL — SIGNIFICANT CHANGE UP (ref 0–0.9)
MONOCYTES NFR BLD AUTO: 0.9 % — LOW (ref 2–14)
NEUTROPHILS # BLD AUTO: 3.53 K/UL — SIGNIFICANT CHANGE UP (ref 1.8–7.4)
NEUTROPHILS NFR BLD AUTO: 83.4 % — HIGH (ref 43–77)
NITRITE UR-MCNC: NEGATIVE — SIGNIFICANT CHANGE UP
OVALOCYTES BLD QL SMEAR: SLIGHT — SIGNIFICANT CHANGE UP
PH UR: 6 — SIGNIFICANT CHANGE UP (ref 5–8)
PLAT MORPH BLD: ABNORMAL
PLATELET # BLD AUTO: 88 K/UL — LOW (ref 150–400)
POLYCHROMASIA BLD QL SMEAR: SLIGHT — SIGNIFICANT CHANGE UP
POTASSIUM SERPL-MCNC: 4 MMOL/L — SIGNIFICANT CHANGE UP (ref 3.5–5.3)
POTASSIUM SERPL-SCNC: 4 MMOL/L — SIGNIFICANT CHANGE UP (ref 3.5–5.3)
PROCALCITONIN SERPL-MCNC: 0.12 NG/ML — HIGH (ref 0.02–0.1)
PROT SERPL-MCNC: 8.4 G/DL — HIGH (ref 6–8.3)
PROT UR-MCNC: 30 MG/DL
PROTHROM AB SERPL-ACNC: 13.1 SEC — HIGH (ref 9.5–13)
RAPID RVP RESULT: SIGNIFICANT CHANGE UP
RBC # BLD: 4.63 M/UL — SIGNIFICANT CHANGE UP (ref 4.2–5.8)
RBC # BLD: 4.65 M/UL — SIGNIFICANT CHANGE UP (ref 4.2–5.8)
RBC # FLD: 26.5 % — HIGH (ref 10.3–14.5)
RBC BLD AUTO: ABNORMAL
RBC CASTS # UR COMP ASSIST: 1 /HPF — SIGNIFICANT CHANGE UP (ref 0–4)
RETICS #: 71.3 K/UL — SIGNIFICANT CHANGE UP (ref 25–125)
RETICS/RBC NFR: 1.5 % — SIGNIFICANT CHANGE UP (ref 0.5–2.5)
S PNEUM AG UR QL: NEGATIVE — SIGNIFICANT CHANGE UP
SARS-COV-2 RNA SPEC QL NAA+PROBE: SIGNIFICANT CHANGE UP
SODIUM SERPL-SCNC: 133 MMOL/L — LOW (ref 135–145)
SODIUM UR-SCNC: 103 MMOL/L — SIGNIFICANT CHANGE UP
SP GR SPEC: 1.02 — SIGNIFICANT CHANGE UP (ref 1–1.03)
SPECIMEN SOURCE: SIGNIFICANT CHANGE UP
SQUAMOUS # UR AUTO: 0 /HPF — SIGNIFICANT CHANGE UP (ref 0–5)
UROBILINOGEN FLD QL: 1 MG/DL — SIGNIFICANT CHANGE UP (ref 0.2–1)
WBC # BLD: 4.23 K/UL — SIGNIFICANT CHANGE UP (ref 3.8–10.5)
WBC # FLD AUTO: 4.23 K/UL — SIGNIFICANT CHANGE UP (ref 3.8–10.5)
WBC UR QL: 0 /HPF — SIGNIFICANT CHANGE UP (ref 0–5)

## 2024-01-18 PROCEDURE — 99285 EMERGENCY DEPT VISIT HI MDM: CPT

## 2024-01-18 PROCEDURE — 76705 ECHO EXAM OF ABDOMEN: CPT | Mod: 26

## 2024-01-18 PROCEDURE — 76705 ECHO EXAM OF ABDOMEN: CPT | Mod: 26,77

## 2024-01-18 PROCEDURE — 74177 CT ABD & PELVIS W/CONTRAST: CPT | Mod: 26,MA

## 2024-01-18 PROCEDURE — 99222 1ST HOSP IP/OBS MODERATE 55: CPT

## 2024-01-18 PROCEDURE — 71275 CT ANGIOGRAPHY CHEST: CPT | Mod: 26,MA

## 2024-01-18 RX ORDER — SODIUM CHLORIDE 9 MG/ML
1000 INJECTION INTRAMUSCULAR; INTRAVENOUS; SUBCUTANEOUS ONCE
Refills: 0 | Status: COMPLETED | OUTPATIENT
Start: 2024-01-18 | End: 2024-01-18

## 2024-01-18 RX ORDER — AZITHROMYCIN 500 MG/1
500 TABLET, FILM COATED ORAL ONCE
Refills: 0 | Status: COMPLETED | OUTPATIENT
Start: 2024-01-18 | End: 2024-01-18

## 2024-01-18 RX ORDER — IOHEXOL 300 MG/ML
30 INJECTION, SOLUTION INTRAVENOUS ONCE
Refills: 0 | Status: COMPLETED | OUTPATIENT
Start: 2024-01-18 | End: 2024-01-18

## 2024-01-18 RX ORDER — INSULIN LISPRO 100/ML
VIAL (ML) SUBCUTANEOUS
Refills: 0 | Status: DISCONTINUED | OUTPATIENT
Start: 2024-01-18 | End: 2024-01-19

## 2024-01-18 RX ORDER — INSULIN LISPRO 100/ML
VIAL (ML) SUBCUTANEOUS AT BEDTIME
Refills: 0 | Status: DISCONTINUED | OUTPATIENT
Start: 2024-01-18 | End: 2024-01-19

## 2024-01-18 RX ORDER — GLUCAGON INJECTION, SOLUTION 0.5 MG/.1ML
1 INJECTION, SOLUTION SUBCUTANEOUS ONCE
Refills: 0 | Status: DISCONTINUED | OUTPATIENT
Start: 2024-01-18 | End: 2024-01-19

## 2024-01-18 RX ORDER — AZITHROMYCIN 500 MG/1
500 TABLET, FILM COATED ORAL EVERY 24 HOURS
Refills: 0 | Status: DISCONTINUED | OUTPATIENT
Start: 2024-01-19 | End: 2024-01-19

## 2024-01-18 RX ORDER — CEFTRIAXONE 500 MG/1
1000 INJECTION, POWDER, FOR SOLUTION INTRAMUSCULAR; INTRAVENOUS EVERY 24 HOURS
Refills: 0 | Status: DISCONTINUED | OUTPATIENT
Start: 2024-01-19 | End: 2024-01-19

## 2024-01-18 RX ORDER — DEXTROSE 50 % IN WATER 50 %
15 SYRINGE (ML) INTRAVENOUS ONCE
Refills: 0 | Status: DISCONTINUED | OUTPATIENT
Start: 2024-01-18 | End: 2024-01-19

## 2024-01-18 RX ORDER — ENOXAPARIN SODIUM 100 MG/ML
40 INJECTION SUBCUTANEOUS EVERY 24 HOURS
Refills: 0 | Status: DISCONTINUED | OUTPATIENT
Start: 2024-01-18 | End: 2024-01-18

## 2024-01-18 RX ORDER — SODIUM CHLORIDE 9 MG/ML
1000 INJECTION, SOLUTION INTRAVENOUS
Refills: 0 | Status: DISCONTINUED | OUTPATIENT
Start: 2024-01-18 | End: 2024-01-19

## 2024-01-18 RX ORDER — DEXTROSE 50 % IN WATER 50 %
25 SYRINGE (ML) INTRAVENOUS ONCE
Refills: 0 | Status: DISCONTINUED | OUTPATIENT
Start: 2024-01-18 | End: 2024-01-19

## 2024-01-18 RX ORDER — DEXTROSE 50 % IN WATER 50 %
12.5 SYRINGE (ML) INTRAVENOUS ONCE
Refills: 0 | Status: DISCONTINUED | OUTPATIENT
Start: 2024-01-18 | End: 2024-01-19

## 2024-01-18 RX ORDER — PIPERACILLIN AND TAZOBACTAM 4; .5 G/20ML; G/20ML
3.38 INJECTION, POWDER, LYOPHILIZED, FOR SOLUTION INTRAVENOUS ONCE
Refills: 0 | Status: COMPLETED | OUTPATIENT
Start: 2024-01-18 | End: 2024-01-18

## 2024-01-18 RX ORDER — AZITHROMYCIN 500 MG/1
TABLET, FILM COATED ORAL
Refills: 0 | Status: DISCONTINUED | OUTPATIENT
Start: 2024-01-18 | End: 2024-01-19

## 2024-01-18 RX ORDER — ACETAMINOPHEN 500 MG
650 TABLET ORAL EVERY 6 HOURS
Refills: 0 | Status: DISCONTINUED | OUTPATIENT
Start: 2024-01-18 | End: 2024-01-19

## 2024-01-18 RX ORDER — KETOROLAC TROMETHAMINE 30 MG/ML
15 SYRINGE (ML) INJECTION ONCE
Refills: 0 | Status: DISCONTINUED | OUTPATIENT
Start: 2024-01-18 | End: 2024-01-18

## 2024-01-18 RX ADMIN — IOHEXOL 30 MILLILITER(S): 300 INJECTION, SOLUTION INTRAVENOUS at 13:59

## 2024-01-18 RX ADMIN — SODIUM CHLORIDE 1000 MILLILITER(S): 9 INJECTION INTRAMUSCULAR; INTRAVENOUS; SUBCUTANEOUS at 12:28

## 2024-01-18 RX ADMIN — PIPERACILLIN AND TAZOBACTAM 200 GRAM(S): 4; .5 INJECTION, POWDER, LYOPHILIZED, FOR SOLUTION INTRAVENOUS at 19:00

## 2024-01-18 RX ADMIN — SODIUM CHLORIDE 1000 MILLILITER(S): 9 INJECTION INTRAMUSCULAR; INTRAVENOUS; SUBCUTANEOUS at 19:00

## 2024-01-18 RX ADMIN — AZITHROMYCIN 255 MILLIGRAM(S): 500 TABLET, FILM COATED ORAL at 21:39

## 2024-01-18 RX ADMIN — Medication 15 MILLIGRAM(S): at 13:59

## 2024-01-18 RX ADMIN — SODIUM CHLORIDE 1000 MILLILITER(S): 9 INJECTION INTRAMUSCULAR; INTRAVENOUS; SUBCUTANEOUS at 18:43

## 2024-01-18 RX ADMIN — Medication 15 MILLIGRAM(S): at 18:43

## 2024-01-18 NOTE — ED ADULT NURSE NOTE - OBJECTIVE STATEMENT
48y M pmHx DM2, presents to ED c/o multiple medical complaints. Endorses generalized abdominal pain x1 week a/w N/V, decreased appetite. Pt also c/o blood in mucus when blowing nose, states "Every time I blow my nose because it's cold out there's blood in it." Pt also c/o BL hand and foot pain, states "because I have high sugars my feet and hands hurt for 2 months since I was told I had diabetes." Pt reports compliance with insulin and oral medications for DM. Denies fever/chills, urinary sx, hematemesis, constipation/diarrhea, sore throat/difficulty swallowing, CP/SOB. Abdomen firm, tender to palpation BLLQ.  Pt AAOx4, speaking in full and clear sentences, NAD at this time. Ambulatory with steady gait on arrival. Primary language Kyr[place],  Shira ID [id number] used for RN assessment.

## 2024-01-18 NOTE — H&P ADULT - PROBLEM SELECTOR PLAN 7
Patient w/ hx of tuberculosis currently on treatment (started 7 months ago, 2 months to finish). Claims to take 5 different medications but does not remember names or doses. Per previous admissions patient on rifampin, pyrazinimide, isoniazid and pyridoxine. Pt claims to be compliant.   - AM med rec and restart medication as appropriate

## 2024-01-18 NOTE — H&P ADULT - ASSESSMENT
48M w/ PMH of diabetes and tuberculosis presenting w/ 7 day history of L sided abdo pain and vomiting, found to have multifocal PNA and admitted for intravenous antibiotics.

## 2024-01-18 NOTE — H&P ADULT - PROBLEM SELECTOR PLAN 1
Presents with ***. CT chest demonstrating multifocal PNA. No pseudomonal risk factors including no IV abx in the last 90 days, structural lung disease, prior colonization, neutropenia or cancer/chemo. No MRSA risk factors including necrotizing or cavitary appearance, post Influenza, or IVDU  - CURB 65 score: 0 points  - c/w Ceftriaxone/Azithro  - f/u sputum cx  - f/u RVP  - f/u urine legionella antigen and urine strep antigen Presents with abdominal pain. CT chest demonstrating multifocal PNA. No pseudomonal risk factors including no IV abx in the last 90 days, structural lung disease, prior colonization, neutropenia or cancer/chemo. No MRSA risk factors including necrotizing or cavitary appearance, post Influenza, or IVDU  - CURB 65 score: 0 points  - c/w Ceftriaxone/Azithro  - f/u sputum cx  - f/u RVP  - f/u urine legionella antigen and urine strep antigen Presents with abdominal pain. CT chest demonstrating multifocal PNA. No pseudomonal risk factors including no IV abx in the last 90 days, structural lung disease, prior colonization, neutropenia or cancer/chemo. No MRSA risk factors including necrotizing or cavitary appearance, post Influenza, or IVDU  - CURB 65 score: 0 points  - c/w Ceftriaxone/Azithro  - f/u sputum cx  - f/u RVP  - f/u urine legionella antigen and urine strep antigen  - procal

## 2024-01-18 NOTE — ED ADULT TRIAGE NOTE - NS ED TRIAGE AVPU SCALE
cardiac precautions/fall precautions Alert-The patient is alert, awake and responds to voice. The patient is oriented to time, place, and person. The triage nurse is able to obtain subjective information.

## 2024-01-18 NOTE — ED PROVIDER NOTE - OBJECTIVE STATEMENT
Pt is a 49yo m, h/o dm, anemia, tb on tb meds x 7 months, who p/w luq/ epig abd pain x 1 wk, intermittent, a/w n/v, nbnb emesis. No d/c, melena/ bloody stools, urinary sx's, fever, chills. Denies uri sx's, cp, sob. Pt admits to drinking some alcohol over xmas and [place] and feels sxs began after that. Denies regular etoh use. No substance use. Language line used for interpretation.

## 2024-01-18 NOTE — H&P ADULT - NSHPSOCIALHISTORY_GEN_ALL_CORE
Marital Status:  (   )    (  ) Single    (   )    (  )   Lives with: (  ) alone  (  ) children   (  ) spouse   (  ) parents  (  ) other  Recent Travel: No recent travel  Occupation:   Mobility:   Functional status:   Substance Use (street drugs): (  ) never used  (  ) other:  Tobacco Usage:  (   ) never smoked   (   ) former smoker   (   ) current smoker  (     ) pack year  Alcohol Usage: - Marital Status:  Single  Lives with: mother  Occupation: unemployed  Mobility: no aids  Functional status: independent    Substance Use (street drugs): never used  Tobacco Usage: never smoked  Alcohol Usage: social, no drink since December

## 2024-01-18 NOTE — H&P ADULT - PROBLEM SELECTOR PLAN 2
Pt presenting w/ abdominal pain. Elevated bilirubin and ALP on admission labs. CTAP consistent w/ gallbladder stones and ?gallbladder wall edema. Hicks ______. RUQUS demonstrated ________  - fractionate bili   - GI consult Pt presenting w/ abdominal pain. Elevated bilirubin and ALP on admission labs. CTAP consistent w/ gallbladder stones and ?gallbladder wall edema. Hicks negative.  - f/u RUQUS results   - fractionate bili   - GI consult if concern for billiary disease Pt presenting w/ abdominal pain. Elevated bilirubin and ALP on admission labs. CTAP consistent w/ gallbladder stones and ?gallbladder wall edema. Hicks negative.  - f/u RUQUS results   - fractionate bili   - surgery consult  - NPO

## 2024-01-18 NOTE — H&P ADULT - PROBLEM SELECTOR PLAN 8
F: s/p 2L NS in the ED   E: replete K<4, Mg<2  N: regular  VTE Prophylaxis: lovenox  GI: none  C: Full Code  D: [facility] F: s/p 2L NS in the ED   E: replete K<4, Mg<2  N: regular  VTE Prophylaxis: none before cleared by surgery  GI: none  C: Full Code  D: F

## 2024-01-18 NOTE — H&P ADULT - ATTENDING COMMENTS
#multifocal pna: 0/4 SIRs. Denies cough, no hemoptysis or night sweats.  ROS +abd pain. F/up RVP, procal, urine legionella/strep ag pending. c/w ceft, azitho.    #Abd pains: p/w L abd pain. CTAP w/ cholithiasis and GB wall thickening c/f acute booker. PE neg for nurphys/rebound. Abd US pending. Lipase wnl. Denies gerd. c/w bowel regimen for constipation. Serial exams    #r/o acute booker: CTAP c/f acute booker. neg murphys. f/up RUQ US, surgery recs

## 2024-01-18 NOTE — ED PROVIDER NOTE - NSTIMEPROVIDERCAREINITIATE_GEN_ER
UNABLE TO WT    Caller: ERICKSON MCARTHUR    Relationship to patient: PATIENT    Best call back number: [phone]    Patient is needing: PCP OF DR HOPPER_QUESTIONS  ALSO NEEDS TO RESCHEDULE           18-Jan-2024 10:52

## 2024-01-18 NOTE — H&P ADULT - PROBLEM SELECTOR PLAN 4
New thrombocytopenia likely due to acute infection. Common mechanisms include destruction (TTP, HUS, DIC, HIT, ITP), decreased production (leukemia, myelodysplastic, viral, drugs, ETOH, Vit B12, folate, copper deficiencies), and sequestration/hypersplenism (cirrhosis, heart failure, malignancy, infections).   - trend CBC w/diff  - HIV, HCV  - Platelet goal > 10   - Hold DVT prophylaxis for plts < 50 New thrombocytopenia likely due to acute infection. Common mechanisms include destruction (TTP, HUS, DIC, HIT, ITP), decreased production (leukemia, myelodysplastic, viral, drugs, ETOH, Vit B12, folate, copper deficiencies), and sequestration/hypersplenism (cirrhosis, heart failure, malignancy, infections).   - trend CBC w/diff  - HIV, Hepatitis panel  - Smear, coags, fibrinogen, LDH, retics  - Platelet goal > 10   - Hold DVT prophylaxis for plts < 50

## 2024-01-18 NOTE — ED PROVIDER NOTE - CLINICAL SUMMARY MEDICAL DECISION MAKING FREE TEXT BOX
Impression: Pt is a 49yo m, h/o dm, anemia, tb on tb meds x 7 months, who p/w luq/ epig abd pain x 1 wk, intermittent, a/w n/v, nbnb emesis. No d/c, melena/ bloody stools, urinary sx's, fever, chills. Denies uri sx's, cp, sob. Pt admits to drinking some alcohol over xmas and [place] and feels sxs began after that. Denies regular etoh use. No substance use. Afebrile, borderline tachycardia, improved w/ ivf hydration. Abd exam benign. No leukocytosis. + anemia (known), thrombocytopenia (new). Na 133. Tb and alk phos minimally elev with normal transaminases. CT a/p showing gb wall thickening w/ gallstones, ? pe and pulm nodules. RUQ sono showing gen gb wall thickening, gallstones, no sonographic murphys sign. Doubt acute cholecystitis given benign abd with no ruq tenderness/ neg murphys sign. Pt underwent dedicated cta chest which was neg for pe, however + multifocal pna.? related to recent vomiting. Pt ordered iv abx and admitted to [facility] for further tx.

## 2024-01-18 NOTE — ED ADULT TRIAGE NOTE - CHIEF COMPLAINT QUOTE
Pt c/o generalized abd pain for a few wks, with body-aches for 1x wk. Pt also c/o that there is blood when blowing his nose. No bleeding noted at this time.

## 2024-01-18 NOTE — ED ADULT TRIAGE NOTE - HEIGHT IN CM
Attempted to call back, no answer. No option to leave voicemail-phone kept ringing. Okay to keep scheduled appt. Per docs last note, he wanted to see patient 4 weeks from the last time he saw him in the hospital (06/16). Patient should go to lab prior to appt to get CBC drawn.    167.64

## 2024-01-18 NOTE — ED ADULT NURSE NOTE - NSFALLUNIVINTERV_ED_ALL_ED
X Size Of Lesion In Cm: 0 Bed/Stretcher in lowest position, wheels locked, appropriate side rails in place/Call bell, personal items and telephone in reach/Instruct patient to call for assistance before getting out of bed/chair/stretcher/Non-slip footwear applied when patient is off stretcher/[place] to call system/Physically safe environment - no spills, clutter or unnecessary equipment/Purposeful proactive rounding/Room/bathroom lighting operational, light cord in reach

## 2024-01-18 NOTE — H&P ADULT - HISTORY OF PRESENT ILLNESS
ED Course:  Vitals: 98.3F, , /85, RR 16(96%) on RA   Labs: Hb 9.2, MCV 69.2, Plt 88, Na 133, Bili 1.4, , lipase 102. UA w/ ketones, bilirubin, glucose and protein  Imaging: CT AP: Suspected pulmonary thromboemboli and b/l pulmonary nodules. Gallbladder contains few tiny stones with suggestion of mild wall edema, negative for significant distention or biliary dilatation.   CT PA: No pulmonary embolism. Scattered small to moderate airspace opacities, most likely representing multifocal pneumonia. Scattered irregular nodules throughout the lungs, most likely infectious in nature. Findings suggestive of acute cholecystitis.  Consults: none  Interventions: 1L NaCl and 15mg Toradol IV   49yo Setswana speaking, poor historian male w/ PMH of diabetes and tuberculosis (on treatment) presenting w/ 7 day history of L sided abdominal pain. Pain started insidiously and improved over time. The pain is intermittent does not radiate and the patient did not attempt anything at home to improve the pain. Pain is not different with food intake. This pain has also been associated w/ 2 week history of nausea and intermittent NBNB vomiting. PPt could not quantify how often he vomits. Pt denies any HA, dizziness, CP, SOB, changes in bowel movements, urinary sx. Pt describes his BMs as brown.     ED Course:  Vitals: 98.3F, , /85, RR 16(96%) on RA   Labs: Hb 9.2, MCV 69.2, Plt 88, Na 133, Bili 1.4, , lipase 102. UA w/ ketones, bilirubin, glucose and protein  Imaging: CT AP: Suspected pulmonary thromboemboli and b/l pulmonary nodules. Gallbladder contains few tiny stones with suggestion of mild wall edema, negative for significant distention or biliary dilatation.   CT PA: No pulmonary embolism. Scattered small to moderate airspace opacities, most likely representing multifocal pneumonia. Scattered irregular nodules throughout the lungs, most likely infectious in nature. Findings suggestive of acute cholecystitis.  Consults: none  Interventions: 1L NaCl and 15mg Toradol IV

## 2024-01-18 NOTE — H&P ADULT - PROBLEM SELECTOR PLAN 3
Hgb on admission 9.2, MCV 69.2. Currently no signs of active bleeding (no hematochezia, melena, hemoptysis, hematuria). Previously Hb baseline 7-8 - was discharged with iron previously.  - obtain iron panel  - trend CBC  - maintain active T&S  - transfuse if Hgb <7

## 2024-01-18 NOTE — H&P ADULT - PROBLEM SELECTOR PLAN 5
Na 133. Calculated Serum Osm 274. 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

## 2024-01-18 NOTE — ED ADULT TRIAGE NOTE - ESI TRIAGE ACUITY LEVEL, MLM
Reviewed below with Dr. Winters. VORB Dr. Avendaño; patient deemed to be a low surgical risk from Electrophysiology standpoint.      3

## 2024-01-18 NOTE — H&P ADULT - NSHPPHYSICALEXAM_GEN_ALL_CORE
VITAL SIGNS:  T(F): 97.3 (01-18-24 @ 18:37)  HR: 85 (01-18-24 @ 18:37)  BP: 142/78 (01-18-24 @ 18:37)  RR: 16 (01-18-24 @ 18:37)  SpO2: 97% (01-18-24 @ 18:37)  Wt(kg): --    PHYSICAL EXAM:  Constitutional: NAD  HEENT: PERRL, EOMI, sclera non-icteric, neck supple, trachea midline, no masses, no JVD, MMM  Respiratory: CTA b/l, good air entry b/l, no wheezing, no rhonchi, no rales, without accessory muscle use and no intercostal retractions  Cardiovascular: RRR, normal S1S2, no M/R/G  Gastrointestinal: soft, NTND, no masses palpable, BS normal  Extremities: Warm, well perfused, pulses equal bilateral upper and lower extremities, no edema, no clubbing  Neurological: AAOx3, CN Grossly intact  Skin: Normal temperature, warm, dry VITAL SIGNS:  T(F): 97.3 (01-18-24 @ 18:37)  HR: 85 (01-18-24 @ 18:37)  BP: 142/78 (01-18-24 @ 18:37)  RR: 16 (01-18-24 @ 18:37)  SpO2: 97% (01-18-24 @ 18:37)  Wt(kg): --    PHYSICAL EXAM:  Constitutional: NAD  HEENT: PERRL, EOMI, sclera non-icteric, neck supple, trachea midline, no masses, no JVD, MMM  Respiratory: scattered rales, without accessory muscle use and no intercostal retractions  Cardiovascular: RRR, normal S1S2, no M/R/G  Gastrointestinal: soft, NTND, mass palpable extending from LUQ towards RLQ, BS normal  Extremities: Warm, well perfused, pulses equal bilateral upper and lower extremities, no edema, no clubbing  Neurological: AAOx3, CN Grossly intact  Skin: Normal temperature, warm, dry

## 2024-01-18 NOTE — H&P ADULT - PROBLEM SELECTOR PLAN 6
F: s/p 2L NS in the ED   E: replete K<4, Mg<2  N: regular  VTE Prophylaxis: lovenox  GI: none  C: Full Code  D: [facility] Pt has hx of Type 2 DM. On home insulin however does not know the dose. Per previosu admissions 12units lantus, 12 units lispro TID and metformin 500mg BID,  - Consistent Carb Diet  - moderate ISF-25 lispro sliding scale AC+qHS  - monitor FS  - med rec in AM

## 2024-01-19 ENCOUNTER — TRANSCRIPTION ENCOUNTER (OUTPATIENT)
Age: 49
End: 2024-01-19

## 2024-01-19 VITALS
RESPIRATION RATE: 18 BRPM | SYSTOLIC BLOOD PRESSURE: 134 MMHG | DIASTOLIC BLOOD PRESSURE: 78 MMHG | OXYGEN SATURATION: 97 % | HEART RATE: 86 BPM | TEMPERATURE: 99 F

## 2024-01-19 DIAGNOSIS — K80.20 CALCULUS OF GALLBLADDER WITHOUT CHOLECYSTITIS WITHOUT OBSTRUCTION: ICD-10-CM

## 2024-01-19 LAB
-  COAGULASE NEGATIVE STAPHYLOCOCCUS: SIGNIFICANT CHANGE UP
A1C WITH ESTIMATED AVERAGE GLUCOSE RESULT: 9.1 % — HIGH (ref 4–5.6)
ALBUMIN SERPL ELPH-MCNC: 3.7 G/DL — SIGNIFICANT CHANGE UP (ref 3.3–5)
ALP SERPL-CCNC: 118 U/L — SIGNIFICANT CHANGE UP (ref 40–120)
ALT FLD-CCNC: 16 U/L — SIGNIFICANT CHANGE UP (ref 10–45)
ANION GAP SERPL CALC-SCNC: 11 MMOL/L — SIGNIFICANT CHANGE UP (ref 5–17)
AST SERPL-CCNC: 26 U/L — SIGNIFICANT CHANGE UP (ref 10–40)
BASOPHILS # BLD AUTO: 0.04 K/UL — SIGNIFICANT CHANGE UP (ref 0–0.2)
BASOPHILS NFR BLD AUTO: 1.2 % — SIGNIFICANT CHANGE UP (ref 0–2)
BILIRUB SERPL-MCNC: 0.7 MG/DL — SIGNIFICANT CHANGE UP (ref 0.2–1.2)
BUN SERPL-MCNC: 8 MG/DL — SIGNIFICANT CHANGE UP (ref 7–23)
CALCIUM SERPL-MCNC: 8.4 MG/DL — SIGNIFICANT CHANGE UP (ref 8.4–10.5)
CHLORIDE SERPL-SCNC: 104 MMOL/L — SIGNIFICANT CHANGE UP (ref 96–108)
CO2 SERPL-SCNC: 19 MMOL/L — LOW (ref 22–31)
CREAT SERPL-MCNC: 0.51 MG/DL — SIGNIFICANT CHANGE UP (ref 0.5–1.3)
EGFR: 126 ML/MIN/1.73M2 — SIGNIFICANT CHANGE UP
EOSINOPHIL # BLD AUTO: 0.11 K/UL — SIGNIFICANT CHANGE UP (ref 0–0.5)
EOSINOPHIL NFR BLD AUTO: 3.2 % — SIGNIFICANT CHANGE UP (ref 0–6)
ESTIMATED AVERAGE GLUCOSE: 214 MG/DL — HIGH (ref 68–114)
FERRITIN SERPL-MCNC: 49 NG/ML — SIGNIFICANT CHANGE UP (ref 30–400)
GLUCOSE BLDC GLUCOMTR-MCNC: 139 MG/DL — HIGH (ref 70–99)
GLUCOSE BLDC GLUCOMTR-MCNC: 144 MG/DL — HIGH (ref 70–99)
GLUCOSE SERPL-MCNC: 143 MG/DL — HIGH (ref 70–99)
GRAM STN FLD: ABNORMAL
GRAM STN FLD: ABNORMAL
HAV IGM SER-ACNC: REACTIVE
HBV CORE AB SER-ACNC: SIGNIFICANT CHANGE UP
HBV CORE IGM SER-ACNC: SIGNIFICANT CHANGE UP
HBV SURFACE AB SER-ACNC: SIGNIFICANT CHANGE UP
HBV SURFACE AG SER-ACNC: SIGNIFICANT CHANGE UP
HCT VFR BLD CALC: 29.8 % — LOW (ref 39–50)
HCV AB S/CO SERPL IA: 0.06 S/CO — SIGNIFICANT CHANGE UP
HCV AB SERPL-IMP: SIGNIFICANT CHANGE UP
HGB BLD-MCNC: 8.5 G/DL — LOW (ref 13–17)
HIV 1+2 AB+HIV1 P24 AG SERPL QL IA: SIGNIFICANT CHANGE UP
IMM GRANULOCYTES NFR BLD AUTO: 0.3 % — SIGNIFICANT CHANGE UP (ref 0–0.9)
IRON SATN MFR SERPL: 18 UG/DL — LOW (ref 45–165)
IRON SATN MFR SERPL: 5 % — LOW (ref 16–55)
LYMPHOCYTES # BLD AUTO: 0.67 K/UL — LOW (ref 1–3.3)
LYMPHOCYTES # BLD AUTO: 19.6 % — SIGNIFICANT CHANGE UP (ref 13–44)
MAGNESIUM SERPL-MCNC: 1.8 MG/DL — SIGNIFICANT CHANGE UP (ref 1.6–2.6)
MCHC RBC-ENTMCNC: 20 PG — LOW (ref 27–34)
MCHC RBC-ENTMCNC: 28.5 GM/DL — LOW (ref 32–36)
MCV RBC AUTO: 70.3 FL — LOW (ref 80–100)
METHOD TYPE: SIGNIFICANT CHANGE UP
MONOCYTES # BLD AUTO: 0.36 K/UL — SIGNIFICANT CHANGE UP (ref 0–0.9)
MONOCYTES NFR BLD AUTO: 10.6 % — SIGNIFICANT CHANGE UP (ref 2–14)
NEUTROPHILS # BLD AUTO: 2.22 K/UL — SIGNIFICANT CHANGE UP (ref 1.8–7.4)
NEUTROPHILS NFR BLD AUTO: 65.1 % — SIGNIFICANT CHANGE UP (ref 43–77)
NRBC # BLD: 0 /100 WBCS — SIGNIFICANT CHANGE UP (ref 0–0)
PHOSPHATE SERPL-MCNC: 2.9 MG/DL — SIGNIFICANT CHANGE UP (ref 2.5–4.5)
PLATELET # BLD AUTO: 84 K/UL — LOW (ref 150–400)
POTASSIUM SERPL-MCNC: 3.6 MMOL/L — SIGNIFICANT CHANGE UP (ref 3.5–5.3)
POTASSIUM SERPL-SCNC: 3.6 MMOL/L — SIGNIFICANT CHANGE UP (ref 3.5–5.3)
PROT SERPL-MCNC: 7.3 G/DL — SIGNIFICANT CHANGE UP (ref 6–8.3)
RBC # BLD: 4.24 M/UL — SIGNIFICANT CHANGE UP (ref 4.2–5.8)
RBC # FLD: 26.7 % — HIGH (ref 10.3–14.5)
SODIUM SERPL-SCNC: 134 MMOL/L — LOW (ref 135–145)
TIBC SERPL-MCNC: 385 UG/DL — SIGNIFICANT CHANGE UP (ref 220–430)
UIBC SERPL-MCNC: 367 UG/DL — SIGNIFICANT CHANGE UP (ref 110–370)
WBC # BLD: 3.41 K/UL — LOW (ref 3.8–10.5)
WBC # FLD AUTO: 3.41 K/UL — LOW (ref 3.8–10.5)

## 2024-01-19 PROCEDURE — 85045 AUTOMATED RETICULOCYTE COUNT: CPT

## 2024-01-19 PROCEDURE — 82962 GLUCOSE BLOOD TEST: CPT

## 2024-01-19 PROCEDURE — 80053 COMPREHEN METABOLIC PANEL: CPT

## 2024-01-19 PROCEDURE — 87899 AGENT NOS ASSAY W/OPTIC: CPT

## 2024-01-19 PROCEDURE — 81001 URINALYSIS AUTO W/SCOPE: CPT

## 2024-01-19 PROCEDURE — 85384 FIBRINOGEN ACTIVITY: CPT

## 2024-01-19 PROCEDURE — 96361 HYDRATE IV INFUSION ADD-ON: CPT

## 2024-01-19 PROCEDURE — 82570 ASSAY OF URINE CREATININE: CPT

## 2024-01-19 PROCEDURE — 83690 ASSAY OF LIPASE: CPT

## 2024-01-19 PROCEDURE — 87186 SC STD MICRODIL/AGAR DIL: CPT

## 2024-01-19 PROCEDURE — 85025 COMPLETE CBC W/AUTO DIFF WBC: CPT

## 2024-01-19 PROCEDURE — 86706 HEP B SURFACE ANTIBODY: CPT

## 2024-01-19 PROCEDURE — 99239 HOSP IP/OBS DSCHRG MGMT >30: CPT | Mod: GC

## 2024-01-19 PROCEDURE — 87449 NOS EACH ORGANISM AG IA: CPT

## 2024-01-19 PROCEDURE — 87150 DNA/RNA AMPLIFIED PROBE: CPT

## 2024-01-19 PROCEDURE — 96374 THER/PROPH/DIAG INJ IV PUSH: CPT

## 2024-01-19 PROCEDURE — 83036 HEMOGLOBIN GLYCOSYLATED A1C: CPT

## 2024-01-19 PROCEDURE — 82728 ASSAY OF FERRITIN: CPT

## 2024-01-19 PROCEDURE — 76705 ECHO EXAM OF ABDOMEN: CPT

## 2024-01-19 PROCEDURE — A9537: CPT

## 2024-01-19 PROCEDURE — 86709 HEPATITIS A IGM ANTIBODY: CPT

## 2024-01-19 PROCEDURE — 0225U NFCT DS DNA&RNA 21 SARSCOV2: CPT

## 2024-01-19 PROCEDURE — 99285 EMERGENCY DEPT VISIT HI MDM: CPT

## 2024-01-19 PROCEDURE — 36415 COLL VENOUS BLD VENIPUNCTURE: CPT

## 2024-01-19 PROCEDURE — 83615 LACTATE (LD) (LDH) ENZYME: CPT

## 2024-01-19 PROCEDURE — 85730 THROMBOPLASTIN TIME PARTIAL: CPT

## 2024-01-19 PROCEDURE — 87389 HIV-1 AG W/HIV-1&-2 AB AG IA: CPT

## 2024-01-19 PROCEDURE — 83540 ASSAY OF IRON: CPT

## 2024-01-19 PROCEDURE — 86704 HEP B CORE ANTIBODY TOTAL: CPT

## 2024-01-19 PROCEDURE — 84300 ASSAY OF URINE SODIUM: CPT

## 2024-01-19 PROCEDURE — 74177 CT ABD & PELVIS W/CONTRAST: CPT | Mod: MA

## 2024-01-19 PROCEDURE — 85610 PROTHROMBIN TIME: CPT

## 2024-01-19 PROCEDURE — 83735 ASSAY OF MAGNESIUM: CPT

## 2024-01-19 PROCEDURE — 82248 BILIRUBIN DIRECT: CPT

## 2024-01-19 PROCEDURE — 99231 SBSQ HOSP IP/OBS SF/LOW 25: CPT | Mod: GC

## 2024-01-19 PROCEDURE — 83010 ASSAY OF HAPTOGLOBIN QUANT: CPT

## 2024-01-19 PROCEDURE — 84100 ASSAY OF PHOSPHORUS: CPT

## 2024-01-19 PROCEDURE — 87340 HEPATITIS B SURFACE AG IA: CPT

## 2024-01-19 PROCEDURE — 84145 PROCALCITONIN (PCT): CPT

## 2024-01-19 PROCEDURE — 87070 CULTURE OTHR SPECIMN AEROBIC: CPT

## 2024-01-19 PROCEDURE — 78226 HEPATOBILIARY SYSTEM IMAGING: CPT | Mod: 26

## 2024-01-19 PROCEDURE — 86705 HEP B CORE ANTIBODY IGM: CPT

## 2024-01-19 PROCEDURE — 96375 TX/PRO/DX INJ NEW DRUG ADDON: CPT

## 2024-01-19 PROCEDURE — 78226 HEPATOBILIARY SYSTEM IMAGING: CPT

## 2024-01-19 PROCEDURE — 83550 IRON BINDING TEST: CPT

## 2024-01-19 PROCEDURE — 87040 BLOOD CULTURE FOR BACTERIA: CPT

## 2024-01-19 PROCEDURE — 86803 HEPATITIS C AB TEST: CPT

## 2024-01-19 PROCEDURE — T1013: CPT

## 2024-01-19 PROCEDURE — 71275 CT ANGIOGRAPHY CHEST: CPT | Mod: MA

## 2024-01-19 RX ORDER — POLYETHYLENE GLYCOL 3350 17 G/17G
17 POWDER, FOR SOLUTION ORAL DAILY
Refills: 0 | Status: DISCONTINUED | OUTPATIENT
Start: 2024-01-19 | End: 2024-01-19

## 2024-01-19 RX ORDER — POTASSIUM CHLORIDE 20 MEQ
40 PACKET (EA) ORAL ONCE
Refills: 0 | Status: COMPLETED | OUTPATIENT
Start: 2024-01-19 | End: 2024-01-19

## 2024-01-19 RX ORDER — METFORMIN HYDROCHLORIDE 850 MG/1
1 TABLET ORAL
Qty: 0 | Refills: 0 | DISCHARGE

## 2024-01-19 RX ORDER — SENNA PLUS 8.6 MG/1
2 TABLET ORAL AT BEDTIME
Refills: 0 | Status: DISCONTINUED | OUTPATIENT
Start: 2024-01-19 | End: 2024-01-19

## 2024-01-19 RX ORDER — DULAGLUTIDE 4.5 MG/.5ML
0.75 INJECTION, SOLUTION SUBCUTANEOUS
Qty: 0 | Refills: 0 | DISCHARGE

## 2024-01-19 RX ORDER — AZITHROMYCIN 500 MG/1
1 TABLET, FILM COATED ORAL
Qty: 2 | Refills: 0
Start: 2024-01-19 | End: 2024-01-20

## 2024-01-19 RX ADMIN — Medication 40 MILLIEQUIVALENT(S): at 15:48

## 2024-01-19 RX ADMIN — CEFTRIAXONE 100 MILLIGRAM(S): 500 INJECTION, POWDER, FOR SOLUTION INTRAMUSCULAR; INTRAVENOUS at 03:56

## 2024-01-19 NOTE — PROGRESS NOTE ADULT - ATTENDING COMMENTS
AGA siddiqi. No need for further inpatient evaluation of his gallbladder. However, please have him see Dr. Mast for outpatient follow up regarding polyps in setting of asymptomatic chronic GBW thickening to determine if further imaging/treatment will be needed.

## 2024-01-19 NOTE — DISCHARGE NOTE PROVIDER - HOSPITAL COURSE
48M w/ PMH of diabetes, microcytic anemia, and tuberculosis presenting w/ 7 day history of L sided abdominal pain and vomiting, found to have multifocal PNA and admitted for intravenous antibiotics.     Problems (by list):  1. Multifocal pneumonia   2. Cholelithiasis without obstruction  3. Anemia  4. Thrombocytopenia  5. Hyponatremia  6. DM type II  7. Tuberculosis    New meds:  Meds stopped:  Follow-ups:    Physical Exam at time of discharge: 48M w/ PMH of diabetes, microcytic anemia, and tuberculosis presenting w/ 7 day history of L sided abdominal pain and vomiting, found to have multifocal PNA and admitted for intravenous antibiotics.     Problems (by list):  1. Multifocal pneumonia   Plan: Presents with abdominal pain. CT chest demonstrating multifocal PNA. Pt endorsing mild cough. No pseudomonal risk factors including no IV abx in the last 90 days, structural lung disease, prior colonization, neutropenia or cancer/chemo. No MRSA risk factors including necrotizing or cavitary appearance, post Influenza, or IVDU. Procal slightly elevated .12. Negative RVP, urine legionella/strep.   - CURB 65 score: 0 points  - c/w Ceftriaxone/Azithro  - f/u sputum cx.    2. Cholelithiasis without obstruction  Plan: Pt presenting w/ abdominal pain. Elevated total bilirubin, direct bilirubin, and ALP on admission labs. CTAP consistent w/ gallbladder stones and ?gallbladder wall edema. Hicks negative. RUQ U/S showed cholelithiasis with GB wall thickening (8mm).   - HIDA scan performed, pending results  - surgery consult  - Start Zofran, Pepcid, Maalox for pain relief.    3. Anemia  Plan: Hgb on admission 9.2 --> 8.5, MCV 69.2. Currently no signs of active bleeding (no hematochezia, melena, hemoptysis, hematuria). Previously Hb baseline 7-8 - was discharged with iron previously.  - obtain iron panel  - trend CBC  - maintain active T&S  - transfuse if Hgb <7.    4. Thrombocytopenia  Plan: New thrombocytopenia likely due to acute infection. Slightly elevated PT and aPTT (13.1, 36.9 respectively). Common mechanisms include destruction (TTP, HUS, DIC, HIT, ITP), decreased production (leukemia, myelodysplastic, viral, drugs, ETOH, Vit B12, folate, copper deficiencies), and sequestration/hypersplenism (cirrhosis, heart failure, malignancy, infections).   - trend CBC w/diff  - HIV, Hepatitis panel  - Platelet goal > 10   - Hold DVT prophylaxis for plts < 50.    5. Hyponatremia  Na 133-->134. Calculated Serum Osm 274. Likely SIADH iso acute pulmonary process  -f/u Serum Osm, Urine Osm, Urine Na.    6. DM type II  Pt has hx of Type 2 DM. On home insulin however does not know the dose. Per previous admissions 12units lantus, 12 units lispro TID and metformin 500mg BID. Home meds verified by pharmacist at [facility] ([place]): Metformin 1000mg PO BID, Farxiga 10mg PO QD, Gabapentin 100mg PO QD, Trulicity 0.75mg injection subQ x1/week  - Consistent Carb Diet  - moderate ISF-25 lispro sliding scale AC+qHS  - monitor FS  - hold home Metformin, Farxiga, Trulicity in setting on infection.    7. Tuberculosis   Patient w/ hx of tuberculosis currently on treatment (started 7 months ago, 2 months to finish). Claims to take 5 different medications but does not remember names or doses. Per previous admissions patient on rifampin, pyrazinamide, isoniazid and pyridoxine. Pt claims to be compliant.   - c/w INH 300mg PO QD, Pyrazinamide 500mg 3 tabs PO QD, Pyridoxine 50mg PO QD, Rifampin 300mg 2 caps QD.    New meds: Ceftriaxone, Azithromycin   Meds stopped:  Discharged to: home  Follow-ups: follow up anemia, diabetes, and Tuberculosis with PCP. Follow up with GI    Physical Exam at time of discharge:  GENERAL: NAD, well-groomed, well-developed  HEAD:  Atraumatic, Normocephalic  EYES: EOMI, PERRLA, conjunctiva and sclera clear  ENMT: Moist mucous membranes, Good dentition, No lesions  NECK: Supple, No JVD, Normal thyroid  NERVOUS SYSTEM:  Alert & Oriented X3, Good concentration; Motor Strength 5/5 B/L upper and lower extremities; DTRs 2+ intact and symmetric  CHEST/LUNG: Clear to auscultation bilaterally; No rales, rhonchi, wheezing, or rubs  HEART: Regular rate and rhythm; No murmurs, rubs, or gallops  ABDOMEN: Rigid abdomen, Nontender, Nondistended; Hyperactive bowel sounds present  EXTREMITIES:  2+ Peripheral Pulses, No clubbing, cyanosis, or edema  LYMPH: No lymphadenopathy noted  SKIN: No rashes or lesions 48M w/ PMH of diabetes, microcytic anemia, and tuberculosis presenting w/ 7 day history of L sided abdominal pain and vomiting, found to have multifocal PNA and admitted for intravenous antibiotics.     Problems (by list):  1. Multifocal pneumonia   Plan: Presents with abdominal pain. CT chest demonstrating multifocal PNA. Pt endorsing mild cough. No pseudomonal risk factors including no IV abx in the last 90 days, structural lung disease, prior colonization, neutropenia or cancer/chemo. No MRSA risk factors including necrotizing or cavitary appearance, post Influenza, or IVDU. Procal slightly elevated .12. Negative RVP, urine legionella/strep.   - CURB 65 score: 0 points  - c/w Ceftriaxone/Azithro  - f/u sputum cx  - repeat chest CT scan in 6 months     2. Cholelithiasis without obstruction  Plan: Pt presenting w/ abdominal pain. Elevated total bilirubin, direct bilirubin, and ALP on admission labs. CTAP consistent w/ gallbladder stones and ?gallbladder wall edema. Hicks negative. RUQ U/S showed cholelithiasis with GB wall thickening (8mm).   - HIDA scan performed, negative results  - start Zofran, Pepcid, Maalox for pain relief  - follow up with [facility] GI     3. Anemia  Plan: Hgb on admission 9.2 --> 8.5, MCV 69.2. Currently no signs of active bleeding (no hematochezia, melena, hemoptysis, hematuria). Previously Hb baseline 7-8 - was discharged with iron previously.  - obtain iron panel  - trend CBC  - maintain active T&S  - transfuse if Hgb <7  - follow up with PCP for anemia    4. Thrombocytopenia  Plan: New thrombocytopenia likely due to acute infection. Slightly elevated PT and aPTT (13.1, 36.9 respectively). Common mechanisms include destruction (TTP, HUS, DIC, HIT, ITP), decreased production (leukemia, myelodysplastic, viral, drugs, ETOH, Vit B12, folate, copper deficiencies), and sequestration/hypersplenism (cirrhosis, heart failure, malignancy, infections).   - trend CBC w/diff  - HIV, Hepatitis panel  - Platelet goal > 10   - Hold DVT prophylaxis for plts < 50  - follow up with PCP    5. Hyponatremia  Na 133-->134. Calculated Serum Osm 274. Likely SIADH iso acute pulmonary process  -f/u Serum Osm, Urine Osm, Urine Na.    6. DM type II  Pt has hx of Type 2 DM. On home insulin however does not know the dose. Per previous admissions 12units lantus, 12 units lispro TID and metformin 500mg BID. Home meds verified by pharmacist at [facility] ([place]): Metformin 1000mg PO BID, Farxiga 10mg PO QD, Gabapentin 100mg PO QD, Trulicity 0.75mg injection subQ x1/week  - Consistent Carb Diet  - moderate ISF-25 lispro sliding scale AC+qHS  - monitor FS  - hold home Metformin, Farxiga, Trulicity in setting on infection  - follow up with PCP    7. Tuberculosis   Patient w/ hx of tuberculosis currently on treatment (started 7 months ago, 2 months to finish). Claims to take 5 different medications but does not remember names or doses. Per previous admissions patient on rifampin, pyrazinamide, isoniazid and pyridoxine. Pt claims to be compliant.   - c/w INH 300mg PO QD, Pyrazinamide 500mg 3 tabs PO QD, Pyridoxine 50mg PO QD, Rifampin 300mg 2 caps QD    New meds: Ceftriaxone, Azithromycin   Meds stopped:  Discharged to: home  Follow-ups: Follow up anemia, diabetes, and Tuberculosis with PCP Dr. Durán at [facility] on Thursday 1/25 at 9:30AM (bring insurance/ID cards). Follow up with GI    Physical Exam at time of discharge:  GENERAL: NAD, well-groomed, well-developed  HEAD:  Atraumatic, Normocephalic  EYES: EOMI, PERRLA, conjunctiva and sclera clear  ENMT: Moist mucous membranes, Good dentition, No lesions  NECK: Supple, No JVD, Normal thyroid  NERVOUS SYSTEM:  Alert & Oriented X3, Good concentration; Motor Strength 5/5 B/L upper and lower extremities; DTRs 2+ intact and symmetric  CHEST/LUNG: Clear to auscultation bilaterally; No rales, rhonchi, wheezing, or rubs  HEART: Regular rate and rhythm; No murmurs, rubs, or gallops  ABDOMEN: Rigid abdomen, Nontender, Nondistended; Hyperactive bowel sounds present  EXTREMITIES:  2+ Peripheral Pulses, No clubbing, cyanosis, or edema  LYMPH: No lymphadenopathy noted  SKIN: No rashes or lesions 48M w/ PMH of diabetes, microcytic anemia, and tuberculosis presenting w/ 7 day history of L sided abdominal pain and vomiting, found to have multifocal PNA and admitted for intravenous antibiotics.     Problems (by list):  1. Multifocal pneumonia   Plan: Presents with abdominal pain. CT chest demonstrating multifocal PNA. Pt endorsing mild cough. No pseudomonal risk factors including no IV abx in the last 90 days, structural lung disease, prior colonization, neutropenia or cancer/chemo. No MRSA risk factors including necrotizing or cavitary appearance, post Influenza, or IVDU. Procal slightly elevated .12. Negative RVP, urine legionella/strep.   - CURB 65 score: 0 points  - c/w Ceftriaxone/Azithro  - f/u sputum cx  - repeat chest CT scan in 6 months     2. Cholelithiasis without obstruction  Plan: Pt presenting w/ abdominal pain. Elevated total bilirubin, direct bilirubin, and ALP on admission labs. CTAP consistent w/ gallbladder stones and ?gallbladder wall edema. Hicks negative. RUQ U/S showed cholelithiasis with GB wall thickening (8mm).   - HIDA scan performed, negative results  - start Zofran, Pepcid, Maalox for pain relief  - follow up with [facility] GI     3. Anemia  Plan: Hgb on admission 9.2 --> 8.5, MCV 69.2. Currently no signs of active bleeding (no hematochezia, melena, hemoptysis, hematuria). Previously Hb baseline 7-8 - was discharged with iron previously.  - obtain iron panel  - trend CBC  - maintain active T&S  - transfuse if Hgb <7  - follow up with PCP for anemia    4. Thrombocytopenia  Plan: New thrombocytopenia likely due to acute infection. Slightly elevated PT and aPTT (13.1, 36.9 respectively). Common mechanisms include destruction (TTP, HUS, DIC, HIT, ITP), decreased production (leukemia, myelodysplastic, viral, drugs, ETOH, Vit B12, folate, copper deficiencies), and sequestration/hypersplenism (cirrhosis, heart failure, malignancy, infections).   - trend CBC w/diff  - HIV, Hepatitis panel  - Platelet goal > 10   - Hold DVT prophylaxis for plts < 50  - follow up with PCP    5. Hyponatremia  Na 133-->134. Calculated Serum Osm 274. Likely SIADH iso acute pulmonary process  -f/u Serum Osm, Urine Osm, Urine Na.    6. DM type II  Pt has hx of Type 2 DM. On home insulin however does not know the dose. Per previous admissions 12units lantus, 12 units lispro TID and metformin 500mg BID. Home meds verified by pharmacist at [facility] ([place]): Metformin 1000mg PO BID, Farxiga 10mg PO QD, Gabapentin 100mg PO QD, Trulicity 0.75mg injection subQ x1/week  - Consistent Carb Diet  - moderate ISF-25 lispro sliding scale AC+qHS  - monitor FS  - hold home Metformin, Farxiga, Trulicity in setting on infection  - follow up with PCP    7. Tuberculosis   Patient w/ hx of tuberculosis currently on treatment (started 7 months ago, 2 months to finish). Claims to take 5 different medications but does not remember names or doses. Per previous admissions patient on rifampin, pyrazinamide, isoniazid and pyridoxine. Pt claims to be compliant.   - c/w INH 300mg PO QD, Pyrazinamide 500mg 3 tabs PO QD, Pyridoxine 50mg PO QD, Rifampin 300mg 2 caps QD    New meds: Ceftriaxone, Azithromycin   Meds stopped:  Discharged to: home  Follow-ups: Follow up anemia, diabetes, and Tuberculosis with PCP Dr. Durán at [facility] on Thursday 1/25 at 9:30AM (bring insurance/ID cards). Follow up with GI    Imaging:  CT AP: Suspected pulmonary thromboemboli and b/l pulmonary nodules. Gallbladder contains few tiny stones with suggestion of mild wall edema, negative for significant distention or biliary dilatation.   CT PA: No pulmonary embolism. Scattered small to moderate airspace opacities, most likely representing multifocal pneumonia. Scattered irregular nodules throughout the lungs, most likely infectious in nature. Findings suggestive of acute cholecystitis.  RUQ U/S: 1. Cholelithiasis with gallbladder wall thickening measuring up to 8 mm. Hicks sign is not demonstrated however this can be masked given that the patient is on pain medication. Findings of cholelithiasis with wall thickening is suspicious for cholecystitis. Recommend further clinical correlation. 2. Increased hepatic echogenicity compatible with steatosis.  HIDA Scan: Normal hepatobiliary scan, no evidence of acute cholecystitis.    Physical Exam at time of discharge:  GENERAL: NAD, well-groomed, well-developed  HEAD:  Atraumatic, Normocephalic  EYES: EOMI, PERRLA, conjunctiva and sclera clear  ENMT: Moist mucous membranes, Good dentition, No lesions  NECK: Supple, No JVD, Normal thyroid  NERVOUS SYSTEM:  Alert & Oriented X3, Good concentration; Motor Strength 5/5 B/L upper and lower extremities; DTRs 2+ intact and symmetric  CHEST/LUNG: Clear to auscultation bilaterally; No rales, rhonchi, wheezing, or rubs  HEART: Regular rate and rhythm; No murmurs, rubs, or gallops  ABDOMEN: Rigid abdomen, Nontender, Nondistended; Hyperactive bowel sounds present  EXTREMITIES:  2+ Peripheral Pulses, No clubbing, cyanosis, or edema  LYMPH: No lymphadenopathy noted  SKIN: No rashes or lesions 48M w/ PMH of diabetes, microcytic anemia, and tuberculosis presenting w/ 7 day history of L sided abdominal pain and vomiting, found to have multifocal PNA and admitted for intravenous antibiotics.     Problems (by list):  1. Multifocal pneumonia   Plan: Presents with abdominal pain. CT chest demonstrating multifocal PNA. Pt endorsing mild cough. No pseudomonal risk factors including no IV abx in the last 90 days, structural lung disease, prior colonization, neutropenia or cancer/chemo. No MRSA risk factors including necrotizing or cavitary appearance, post Influenza, or IVDU. Procal slightly elevated .12. Negative RVP, urine legionella/strep.   - CURB 65 score: 0 points  - c/w Cefpodoxime   - repeat chest CT scan in 6 months     2. Cholelithiasis without obstruction  Plan: Pt presenting w/ abdominal pain. Elevated total bilirubin, direct bilirubin, and ALP on admission labs. CTAP consistent w/ gallbladder stones and ?gallbladder wall edema. Hicks negative. RUQ U/S showed cholelithiasis with GB wall thickening (8mm).   - HIDA scan performed, negative results  - follow up with [facility] surgery for OP cholecystitis    3. Anemia  Plan: Hgb on admission 9.2 --> 8.5, MCV 69.2. Currently no signs of active bleeding (no hematochezia, melena, hemoptysis, hematuria). Previously Hb baseline 7-8 - was discharged with iron previously.  - follow up with PCP for anemia    4. Thrombocytopenia  Plan: New thrombocytopenia likely due to acute infection. Slightly elevated PT and aPTT (13.1, 36.9 respectively). Common mechanisms include destruction (TTP, HUS, DIC, HIT, ITP), decreased production (leukemia, myelodysplastic, viral, drugs, ETOH, Vit B12, folate, copper deficiencies), and sequestration/hypersplenism (cirrhosis, heart failure, malignancy, infections).   - Hep A IgM positive  - follow up with PCP    5. Hyponatremia  Na 133-->134. Calculated Serum Osm 274. Likely SIADH iso acute pulmonary process  - follow up with PCP    6. DM type II  Pt has hx of Type 2 DM. On home insulin however does not know the dose. Per previous admissions 12units lantus, 12 units lispro TID and metformin 500mg BID. Home meds verified by pharmacist at [facility] ([place]): Metformin 1000mg PO BID, Farxiga 10mg PO QD, Gabapentin 100mg PO QD, Trulicity 0.75mg injection subQ x1/week  - follow up with PCP    7. Tuberculosis   Patient w/ hx of tuberculosis currently on treatment (started 7 months ago, 2 months to finish). Claims to take 5 different medications but does not remember names or doses. Per previous admissions patient on rifampin, pyrazinamide, isoniazid and pyridoxine. Pt claims to be compliant.   - c/w INH 300mg PO QD, Pyrazinamide 500mg 3 tabs PO QD, Pyridoxine 50mg PO QD, Rifampin 300mg 2 caps QD    New meds: Cefpodoxime  Meds stopped:  Discharged to: home  Follow-ups: Follow up anemia, diabetes, and Tuberculosis with PCP Dr. Durán at [facility] on Thursday 1/25 at 9:30AM (bring insurance/ID cards). 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 Gallbladder contains few tiny stones with suggestion of mild wall edema, negative for significant distention or biliary dilatation.   CT PA: No pulmonary embolism. Scattered small to moderate airspace opacities, most likely representing multifocal pneumonia. Scattered irregular nodules throughout the lungs, most likely infectious in nature. Findings suggestive of acute cholecystitis.  RUQ U/S: 1. Cholelithiasis with gallbladder wall thickening measuring up to 8 mm. Hicks sign is not demonstrated however this can be masked given that the patient is on pain medication. Findings of cholelithiasis with wall thickening is suspicious for cholecystitis. Recommend further clinical correlation. 2. Increased hepatic echogenicity compatible with steatosis.  HIDA Scan: Normal hepatobiliary scan, no evidence of acute cholecystitis.    Physical Exam at time of discharge:  GENERAL: NAD, well-groomed, well-developed  HEAD:  Atraumatic, Normocephalic  EYES: EOMI, PERRLA, conjunctiva and sclera clear  ENMT: Moist mucous membranes, Good dentition, No lesions  NECK: Supple, No JVD, Normal thyroid  NERVOUS SYSTEM:  Alert & Oriented X3, Good concentration; Motor Strength 5/5 B/L upper and lower extremities; DTRs 2+ intact and symmetric  CHEST/LUNG: Clear to auscultation bilaterally; No rales, rhonchi, wheezing, or rubs  HEART: Regular rate and rhythm; No murmurs, rubs, or gallops  ABDOMEN: Rigid abdomen, Nontender, Nondistended; Hyperactive bowel sounds present  EXTREMITIES:  2+ Peripheral Pulses, No clubbing, cyanosis, or edema  LYMPH: No lymphadenopathy noted  SKIN: No rashes or lesions

## 2024-01-19 NOTE — PROGRESS NOTE ADULT - PROBLEM SELECTOR PLAN 2
Pt presenting w/ abdominal pain. Elevated total bilirubin, direct bilirubin, and ALP on admission labs. CTAP consistent w/ gallbladder stones and ?gallbladder wall edema. Hicks negative. RUQ U/S showed cholelithiasis with GB wall thickening (8mm).   - HIDA scan performed, pending results  - surgery consult  - NPO Pt presenting w/ abdominal pain. Elevated total bilirubin, direct bilirubin, and ALP on admission labs. CTAP consistent w/ gallbladder stones and ?gallbladder wall edema. Hicks negative. RUQ U/S showed cholelithiasis with GB wall thickening (8mm).   - HIDA scan performed, pending results  - surgery consult  - NPO  - Start Zofran, Pepcid, Maalox for pain relief

## 2024-01-19 NOTE — CONSULT NOTE ADULT - SUBJECTIVE AND OBJECTIVE BOX
HPI: 49yo Nigerien speaking w/ PMH of diabetes and tuberculosis (on treatment) presenting w/ 7 day history of L sided abdominal pain. Pain started insidiously and improved over time. The pain is intermittent waxing and waning does not radiate and the patient did not attempt anything at home to improve the pain. Pain is not different with food intake. This pain has also been associated w/ 2 week history of nausea and intermittent NBNB vomiting. PPt could not quantify how often he vomits. He has had decreased PO intake in the interim secondary to the nausea. Pt denies any HA, dizziness, CP, SOB, changes in bowel movements, urinary sx. Pt describes his BMs as brown.     ED Course:  Vitals: 98.3F, , /85, RR 16(96%) on RA   Labs: Hb 9.2, MCV 69.2, Plt 88, Na 133, Bili 1.4, , lipase 102. UA w/ ketones, bilirubin, glucose and protein  Imaging: CT AP: Suspected pulmonary thromboemboli and b/l pulmonary nodules. Gallbladder contains few tiny stones with suggestion of mild wall edema, negative for significant distention or biliary dilatation.   CT PA: No pulmonary embolism. Scattered small to moderate airspace opacities, most likely representing multifocal pneumonia. Scattered irregular nodules throughout the lungs, most likely infectious in nature. Findings suggestive of acute cholecystitis.  Consults: none  Interventions: 1L NaCl and 15mg Toradol IV    PMHx EtOH, TB under treatment, DM  PSHx No prior surgeries  Allergies: NKDA  Social: Former smoker. Reports intermittent EtOH, such as at New Years. No recreationals.     Medications:   · 	rifAMPin 300 mg oral capsule: 2 cap(s) orally once a day  · 	isoniazid 300 mg oral tablet: 1 tab(s) orally once a day  · 	pyrazinamide 500 mg oral tablet: 3 tab(s) orally once a day  · 	Insulin Lispro KwikPen 100 units/mL injectable solution: 12 unit(s) injectable 3 times a day (before meals)  · 	Insulin Glargine Solostar Pen 100 units/mL subcutaneous solution: 12 unit(s) subcutaneous once a day (at bedtime)  · 	insulin lispro 100 units/mL injectable solution: 12 unit(s) injectable 3 times a day (before meals) Please administer 12 units before meals, three times a day. Do not administer if skipping meal.  · 	metFORMIN 500 mg oral tablet: 1 tab(s) orally 2 times a day  · 	insulin glargine 100 units/mL subcutaneous solution: 12 solution subcutaneous once a day (at bedtime) Please administer 12 units once a day before going to bed.  · 	pyridoxine 50 mg oral tablet: 1 tab(s) orally once a day  · 	FeroSul 325 mg (65 mg elemental iron) oral tablet: 1 tab(s) orally once a day      Vital Signs Last 24 Hrs  T(C): 36.3 (2024 18:37), Max: 36.8 (2024 10:14)  T(F): 97.3 (2024 18:37), Max: 98.3 (2024 10:14)  HR: 85 (2024 18:37) (85 - 101)  BP: 142/78 (2024 18:37) (141/89 - 160/85)  BP(mean): --  RR: 16 (2024 18:37) (16 - 16)  SpO2: 97% (2024 18:37) (96% - 99%)    Parameters below as of 2024 18:37  Patient On (Oxygen Delivery Method): room air    PHYSICAL EXAM  General: well appearing, awake alert.   CV: HDS, WWP  Pulm: breathing comfortably on room air  Abdomen: Rotund, protuberant, soft, non-tender, non-distended  Ext: No edema.       LABS:                        9.2    4.23  )-----------( 88       ( 2024 11:07 )             32.2     -18    133<L>  |  101  |  10  ----------------------------<  114<H>  4.0   |  17<L>  |  0.51    Ca    9.0      2024 11:07    TPro  8.4<H>  /  Alb  4.3  /  TBili  1.4<H>  /  DBili  0.5<H>  /  AST  31  /  ALT  16  /  AlkPhos  142<H>  01-18    PT/INR - ( 2024 20:55 )   PT: 13.1 sec;   INR: 1.15          PTT - ( 2024 20:55 )  PTT:36.9 sec  Urinalysis Basic - ( 2024 12:14 )    Color: Dark Yellow / Appearance: Clear / S.024 / pH: x  Gluc: x / Ketone: 40 mg/dL  / Bili: Small / Urobili: 1.0 mg/dL   Blood: x / Protein: 30 mg/dL / Nitrite: Negative   Leuk Esterase: Negative / RBC: 1 /HPF / WBC 0 /HPF   Sq Epi: x / Non Sq Epi: 0 /HPF / Bacteria: Negative /HPF          RADIOLOGY & ADDITIONAL STUDIES:    CT abd/pelv w/ PO IV contrast  IMPRESSION:  1. Suspected pulmonary thromboemboli and bilateral pulmonary nodules. Limited chest evaluation. Recommend dedicated CTA chest pulmonary angiogram for dedicated assessment.  2. Gallbladder contains few tiny stones with suggestion of mild wall edema, negative for significant distention or biliary dilatation. Might consider gallbladder ultrasound correlation to evaluate for acute cholecystitis.    Impression was discussed with Dr. NICK SCHUMACHER [phone] 2024 4:26 PM by Dr. Wilcox with read back confirmation.    Bedside pocus RUQ  INTERPRETATION: Grayscale imaging of the right upper quadrant was performed.  The gallbladder was visualized and scanned in longitudinal and transverse planes.  The anterior gallbladder wall measured 0.53.cm and was diffusely thickened.  The common bile duct measured 0.33.cm.  Gallstones present.  Sludge not present.  Pericholecystic fluid not present.  Gallbladder wall edema not present.  Sonographic Hicks's sign not present.    IMPRESSION:Gallstones present, wall diffusely thickened, no sonographic Hicks's sign.    --- End of Report ---    A/P: 48M w/ history of TB, EtOH, DM presenting w/ LLQ abdoinal pain and emesis. On exam, benign abdominal exam. Bedside US w/ gallstones. WBC normal and afebrile. Formal RUQ US pending. Exam inconsistent w/ acute cholecystitis.     - Please obtain formal RUQ ultrasound.   - Please fractionate bilirubin  - Surgery team 4c will follow.  HPI: 49yo Colombian speaking w/ PMH of diabetes and tuberculosis (on treatment) presenting w/ 7 day history of L sided abdominal pain. Pain started insidiously and improved over time. The pain is intermittent waxing and waning does not radiate and the patient did not attempt anything at home to improve the pain. Pain is not different with food intake. This pain has also been associated w/ 2 week history of nausea and intermittent NBNB vomiting. PPt could not quantify how often he vomits. He has had decreased PO intake in the interim secondary to the nausea. Pt denies any HA, dizziness, CP, SOB, changes in bowel movements, urinary sx. Pt describes his BMs as brown.     ED Course:  Vitals: 98.3F, , /85, RR 16(96%) on RA   Labs: Hb 9.2, MCV 69.2, Plt 88, Na 133, Bili 1.4, , lipase 102. UA w/ ketones, bilirubin, glucose and protein  Imaging: CT AP: Suspected pulmonary thromboemboli and b/l pulmonary nodules. Gallbladder contains few tiny stones with suggestion of mild wall edema, negative for significant distention or biliary dilatation.   CT PA: No pulmonary embolism. Scattered small to moderate airspace opacities, most likely representing multifocal pneumonia. Scattered irregular nodules throughout the lungs, most likely infectious in nature. Findings suggestive of acute cholecystitis.  Consults: none  Interventions: 1L NaCl and 15mg Toradol IV    PMHx EtOH, TB under treatment, DM  PSHx No prior surgeries  Allergies: NKDA  Social: Former smoker. Reports intermittent EtOH, such as at New Years. No recreationals.     Medications:   · 	rifAMPin 300 mg oral capsule: 2 cap(s) orally once a day  · 	isoniazid 300 mg oral tablet: 1 tab(s) orally once a day  · 	pyrazinamide 500 mg oral tablet: 3 tab(s) orally once a day  · 	Insulin Lispro KwikPen 100 units/mL injectable solution: 12 unit(s) injectable 3 times a day (before meals)  · 	Insulin Glargine Solostar Pen 100 units/mL subcutaneous solution: 12 unit(s) subcutaneous once a day (at bedtime)  · 	insulin lispro 100 units/mL injectable solution: 12 unit(s) injectable 3 times a day (before meals) Please administer 12 units before meals, three times a day. Do not administer if skipping meal.  · 	metFORMIN 500 mg oral tablet: 1 tab(s) orally 2 times a day  · 	insulin glargine 100 units/mL subcutaneous solution: 12 solution subcutaneous once a day (at bedtime) Please administer 12 units once a day before going to bed.  · 	pyridoxine 50 mg oral tablet: 1 tab(s) orally once a day  · 	FeroSul 325 mg (65 mg elemental iron) oral tablet: 1 tab(s) orally once a day      Vital Signs Last 24 Hrs  T(C): 36.3 (2024 18:37), Max: 36.8 (2024 10:14)  T(F): 97.3 (2024 18:37), Max: 98.3 (2024 10:14)  HR: 85 (2024 18:37) (85 - 101)  BP: 142/78 (2024 18:37) (141/89 - 160/85)  BP(mean): --  RR: 16 (2024 18:37) (16 - 16)  SpO2: 97% (2024 18:37) (96% - 99%)    Parameters below as of 2024 18:37  Patient On (Oxygen Delivery Method): room air    PHYSICAL EXAM  General: well appearing, awake alert.   CV: HDS, WWP  Pulm: breathing comfortably on room air  Abdomen: Rotund, protuberant, soft, non-tender, non-distended  Ext: No edema.       LABS:                        9.2    4.23  )-----------( 88       ( 2024 11:07 )             32.2     -18    133<L>  |  101  |  10  ----------------------------<  114<H>  4.0   |  17<L>  |  0.51    Ca    9.0      2024 11:07    TPro  8.4<H>  /  Alb  4.3  /  TBili  1.4<H>  /  DBili  0.5<H>  /  AST  31  /  ALT  16  /  AlkPhos  142<H>  01-18    PT/INR - ( 2024 20:55 )   PT: 13.1 sec;   INR: 1.15          PTT - ( 2024 20:55 )  PTT:36.9 sec  Urinalysis Basic - ( 2024 12:14 )    Color: Dark Yellow / Appearance: Clear / S.024 / pH: x  Gluc: x / Ketone: 40 mg/dL  / Bili: Small / Urobili: 1.0 mg/dL   Blood: x / Protein: 30 mg/dL / Nitrite: Negative   Leuk Esterase: Negative / RBC: 1 /HPF / WBC 0 /HPF   Sq Epi: x / Non Sq Epi: 0 /HPF / Bacteria: Negative /HPF          RADIOLOGY & ADDITIONAL STUDIES:    CT abd/pelv w/ PO IV contrast  IMPRESSION:  1. Suspected pulmonary thromboemboli and bilateral pulmonary nodules. Limited chest evaluation. Recommend dedicated CTA chest pulmonary angiogram for dedicated assessment.  2. Gallbladder contains few tiny stones with suggestion of mild wall edema, negative for significant distention or biliary dilatation. Might consider gallbladder ultrasound correlation to evaluate for acute cholecystitis.    Impression was discussed with Dr. NICK SCHUMACHER [phone] 2024 4:26 PM by Dr. Wilcox with read back confirmation.    Bedside pocus RUQ  INTERPRETATION: Grayscale imaging of the right upper quadrant was performed.  The gallbladder was visualized and scanned in longitudinal and transverse planes.  The anterior gallbladder wall measured 0.53.cm and was diffusely thickened.  The common bile duct measured 0.33.cm.  Gallstones present.  Sludge not present.  Pericholecystic fluid not present.  Gallbladder wall edema not present.  Sonographic Hicks's sign not present.    IMPRESSION:Gallstones present, wall diffusely thickened, no sonographic Hicks's sign.    --- End of Report ---    A/P: 48M w/ history of TB, EtOH, DM presenting w/ LLQ abdominal pain and emesis. On exam, benign abdominal exam. Bedside US w/ gallstones. WBC normal and afebrile. Formal RUQ US pending. Exam inconsistent w/ acute cholecystitis.     - Please obtain formal RUQ ultrasound.   - Please fractionate bilirubin  - Surgery team 4c will follow.  HPI: 49yo [place]raini speaking w/ PMH of diabetes and tuberculosis (on treatment) presenting w/ 7 day history of L sided abdominal pain. Pain started insidiously and improved over time. The pain is intermittent waxing and waning does not radiate and the patient did not attempt anything at home to improve the pain. Pain is not different with food intake. This pain has also been associated w/ 2 week history of nausea and intermittent NBNB vomiting. PPt could not quantify how often he vomits. He has had decreased PO intake in the interim secondary to the nausea. Pt denies any HA, dizziness, CP, SOB, changes in bowel movements, urinary sx. Pt describes his BMs as brown, not bloody.     ED Course:  Vitals: 98.3F, , /85, RR 16(96%) on RA   Labs: Hb 9.2, MCV 69.2, Plt 88, Na 133, Bili 1.4, , lipase 102. UA w/ ketones, bilirubin, glucose and protein  Imaging: CT AP: Suspected pulmonary thromboemboli and b/l pulmonary nodules. Gallbladder contains few tiny stones with suggestion of mild wall edema, negative for significant distention or biliary dilatation.   CT PA: No pulmonary embolism. Scattered small to moderate airspace opacities, most likely representing multifocal pneumonia. Scattered irregular nodules throughout the lungs, most likely infectious in nature. Gallbladder contains few tiny stones with suggestion of mild wall edema, negative for significant distention or biliary dilatation. Might consider gallbladder ultrasound correlation to evaluate for acute cholecystitis.  Bedside ultrasound: Gallstones present, wall diffusely thickened, no sonographic Hicks's sign.  Consults: none  Interventions: 1L NaCl and 15mg Toradol IV    PMHx EtOH, TB under treatment, DM  PSHx No prior surgeries  Allergies: NKDA  Social: Former smoker. Reports intermittent EtOH, such as at New Years. No recreationals.     Medications:   · 	rifAMPin 300 mg oral capsule: 2 cap(s) orally once a day  · 	isoniazid 300 mg oral tablet: 1 tab(s) orally once a day  · 	pyrazinamide 500 mg oral tablet: 3 tab(s) orally once a day  · 	Insulin Lispro KwikPen 100 units/mL injectable solution: 12 unit(s) injectable 3 times a day (before meals)  · 	Insulin Glargine Solostar Pen 100 units/mL subcutaneous solution: 12 unit(s) subcutaneous once a day (at bedtime)  · 	insulin lispro 100 units/mL injectable solution: 12 unit(s) injectable 3 times a day (before meals) Please administer 12 units before meals, three times a day. Do not administer if skipping meal.  · 	metFORMIN 500 mg oral tablet: 1 tab(s) orally 2 times a day  · 	insulin glargine 100 units/mL subcutaneous solution: 12 solution subcutaneous once a day (at bedtime) Please administer 12 units once a day before going to bed.  · 	pyridoxine 50 mg oral tablet: 1 tab(s) orally once a day  · 	FeroSul 325 mg (65 mg elemental iron) oral tablet: 1 tab(s) orally once a day      Vital Signs Last 24 Hrs  T(C): 36.3 (2024 18:37), Max: 36.8 (2024 10:14)  T(F): 97.3 (2024 18:37), Max: 98.3 (2024 10:14)  HR: 85 (2024 18:37) (85 - 101)  BP: 142/78 (2024 18:37) (141/89 - 160/85)  BP(mean): --  RR: 16 (2024 18:37) (16 - 16)  SpO2: 97% (2024 18:37) (96% - 99%)    Parameters below as of 2024 18:37  Patient On (Oxygen Delivery Method): room air    PHYSICAL EXAM  General: well appearing, awake alert.   CV: HDS, WWP  Pulm: breathing comfortably on room air  Abdomen: Rotund, protuberant, soft, non-tender, non-distended  Ext: No edema.       LABS:                        9.2    4.23  )-----------( 88       ( 2024 11:07 )             32.2     01-18    133<L>  |  101  |  10  ----------------------------<  114<H>  4.0   |  17<L>  |  0.51    Ca    9.0      2024 11:07    TPro  8.4<H>  /  Alb  4.3  /  TBili  1.4<H>  /  DBili  0.5<H>  /  AST  31  /  ALT  16  /  AlkPhos  142<H>  01-18    PT/INR - ( 2024 20:55 )   PT: 13.1 sec;   INR: 1.15          PTT - ( 2024 20:55 )  PTT:36.9 sec  Urinalysis Basic - ( 2024 12:14 )    Color: Dark Yellow / Appearance: Clear / S.024 / pH: x  Gluc: x / Ketone: 40 mg/dL  / Bili: Small / Urobili: 1.0 mg/dL   Blood: x / Protein: 30 mg/dL / Nitrite: Negative   Leuk Esterase: Negative / RBC: 1 /HPF / WBC 0 /HPF   Sq Epi: x / Non Sq Epi: 0 /HPF / Bacteria: Negative /HPF          RADIOLOGY & ADDITIONAL STUDIES:    CT abd/pelv w/ PO IV contrast  IMPRESSION:  1. Suspected pulmonary thromboemboli and bilateral pulmonary nodules. Limited chest evaluation. Recommend dedicated CTA chest pulmonary angiogram for dedicated assessment.  2. Gallbladder contains few tiny stones with suggestion of mild wall edema, negative for significant distention or biliary dilatation. Might consider gallbladder ultrasound correlation to evaluate for acute cholecystitis.    Impression was discussed with Dr. NICK SCHUMACHER [phone] 2024 4:26 PM by Dr. Wilcox with read back confirmation.    Bedside pocus RUQ  INTERPRETATION: Grayscale imaging of the right upper quadrant was performed.  The gallbladder was visualized and scanned in longitudinal and transverse planes.  The anterior gallbladder wall measured 0.53.cm and was diffusely thickened.  The common bile duct measured 0.33.cm.  Gallstones present.  Sludge not present.  Pericholecystic fluid not present.  Gallbladder wall edema not present.  Sonographic Hicks's sign not present.    IMPRESSION:Gallstones present, wall diffusely thickened, no sonographic Hicks's sign.    --- End of Report ---    A/P: 48M w/ history of TB, EtOH, DM presenting w/ LLQ abdominal pain and emesis. On exam, benign abdominal exam. Bedside US w/ gallstones. WBC normal and afebrile. Formal RUQ US pending. Exam inconsistent w/ acute cholecystitis.     - Please obtain formal RUQ ultrasound.   - Surgery team 4c will follow.

## 2024-01-19 NOTE — PROGRESS NOTE ADULT - SUBJECTIVE AND OBJECTIVE BOX
SUBJECTIVE: Pt seen and examined at bedside this am by surgery team. Patient is lying comfortably in bed with no complaints. Tolerating diet, pain well controlled with current regimen. Patient denies fever, nausea, vomiting, chest pain, and shortness of breath. Patient is passing gas and having bowel movements.     MEDICATIONS  (STANDING):  azithromycin  IVPB      azithromycin  IVPB 500 milliGRAM(s) IV Intermittent every 24 hours  cefTRIAXone   IVPB 1000 milliGRAM(s) IV Intermittent every 24 hours  dextrose 5%. 1000 milliLiter(s) (50 mL/Hr) IV Continuous <Continuous>  dextrose 5%. 1000 milliLiter(s) (100 mL/Hr) IV Continuous <Continuous>  dextrose 50% Injectable 12.5 Gram(s) IV Push once  dextrose 50% Injectable 25 Gram(s) IV Push once  dextrose 50% Injectable 25 Gram(s) IV Push once  glucagon  Injectable 1 milliGRAM(s) IntraMuscular once  insulin lispro (ADMELOG) corrective regimen sliding scale   SubCutaneous three times a day before meals  insulin lispro (ADMELOG) corrective regimen sliding scale   SubCutaneous at bedtime  polyethylene glycol 3350 17 Gram(s) Oral daily  potassium chloride   Powder 40 milliEquivalent(s) Oral once  senna 2 Tablet(s) Oral at bedtime    MEDICATIONS  (PRN):  acetaminophen     Tablet .. 650 milliGRAM(s) Oral every 6 hours PRN Temp greater or equal to 38C (100.4F), Mild Pain (1 - 3)  dextrose Oral Gel 15 Gram(s) Oral once PRN Blood Glucose LESS THAN 70 milliGRAM(s)/deciliter      Vital Signs Last 24 Hrs  T(C): 37.1 (19 Jan 2024 05:17), Max: 37.1 (19 Jan 2024 00:14)  T(F): 98.8 (19 Jan 2024 05:17), Max: 98.8 (19 Jan 2024 00:14)  HR: 88 (19 Jan 2024 05:17) (85 - 89)  BP: 130/80 (19 Jan 2024 05:17) (130/80 - 142/78)  BP(mean): --  RR: 18 (19 Jan 2024 05:17) (16 - 18)  SpO2: 96% (19 Jan 2024 05:17) (96% - 99%)    Parameters below as of 19 Jan 2024 05:17  Patient On (Oxygen Delivery Method): room air        Physical Exam  General: Patient is doing well and lying in bed comfortably  Constitutional: alert and oriented   Pulm: Nonlabored breathing, no respiratory distress  CV: Regular rate and rhythm, normal sinus rhythm  Abd:  soft, nontender, nondistended. No rebound, no guarding.   Extremities: warm, well perfused, no edema    I&O's Detail    LABS:                        8.5    3.41  )-----------( 84       ( 19 Jan 2024 05:30 )             29.8     01-19    134<L>  |  104  |  8   ----------------------------<  143<H>  3.6   |  19<L>  |  0.51    Ca    8.4      19 Jan 2024 05:30  Phos  2.9     01-19  Mg     1.8     01-19    TPro  7.3  /  Alb  3.7  /  TBili  0.7  /  DBili  x   /  AST  26  /  ALT  16  /  AlkPhos  118  01-19    PT/INR - ( 18 Jan 2024 20:55 )   PT: 13.1 sec;   INR: 1.15          PTT - ( 18 Jan 2024 20:55 )  PTT:36.9 sec  Urinalysis Basic - ( 19 Jan 2024 05:30 )    Color: x / Appearance: x / SG: x / pH: x  Gluc: 143 mg/dL / Ketone: x  / Bili: x / Urobili: x   Blood: x / Protein: x / Nitrite: x   Leuk Esterase: x / RBC: x / WBC x   Sq Epi: x / Non Sq Epi: x / Bacteria: x    
INTERVAL HPI/OVERNIGHT EVENTS: No acute overnight events. Pt still endorsing left flank/LLQ pain but states that its improving. Also endorses mild cough. Denies SOB, chest pain, fevers, chills, N/V, dysuria, melena, hemoptysis.        T(C): 37.1 (01-19-24 @ 05:17), Max: 37.1 (01-19-24 @ 00:14)  HR: 88 (01-19-24 @ 05:17) (85 - 89)  BP: 130/80 (01-19-24 @ 05:17) (130/80 - 142/78)  RR: 18 (01-19-24 @ 05:17) (16 - 18)  SpO2: 96% (01-19-24 @ 05:17) (96% - 99%)  Wt(kg): --Vital Signs Last 24 Hrs  T(C): 37.1 (19 Jan 2024 05:17), Max: 37.1 (19 Jan 2024 00:14)  T(F): 98.8 (19 Jan 2024 05:17), Max: 98.8 (19 Jan 2024 00:14)  HR: 88 (19 Jan 2024 05:17) (85 - 89)  BP: 130/80 (19 Jan 2024 05:17) (130/80 - 142/78)  BP(mean): --  RR: 18 (19 Jan 2024 05:17) (16 - 18)  SpO2: 96% (19 Jan 2024 05:17) (96% - 99%)    Parameters below as of 19 Jan 2024 05:17  Patient On (Oxygen Delivery Method): room air        PHYSICAL EXAM:  GENERAL: NAD, well-groomed, well-developed  HEAD:  Atraumatic, Normocephalic  EYES: EOMI, PERRLA, conjunctiva and sclera clear  ENMT: Moist mucous membranes, Good dentition, No lesions  NECK: Supple, No JVD, Normal thyroid  NERVOUS SYSTEM:  Alert & Oriented X3, Good concentration; Motor Strength 5/5 B/L upper and lower extremities; DTRs 2+ intact and symmetric  CHEST/LUNG: Clear to auscultation bilaterally; No rales, rhonchi, wheezing, or rubs  HEART: Regular rate and rhythm; No murmurs, rubs, or gallops  ABDOMEN: Rigid abdomen, Nontender, Nondistended; Hyperactive bowel sounds present  EXTREMITIES:  2+ Peripheral Pulses, No clubbing, cyanosis, or edema  LYMPH: No lymphadenopathy noted  SKIN: No rashes or lesions    Consultant(s) Notes Reviewed:  [x ] YES  [ ] NO  Care Discussed with Consultants/Other Providers [ x] YES  [ ] NO    LABS:                        8.5    3.41  )-----------( 84       ( 19 Jan 2024 05:30 )             29.8     01-19    134<L>  |  104  |  8   ----------------------------<  143<H>  3.6   |  19<L>  |  0.51    Ca    8.4      19 Jan 2024 05:30  Phos  2.9     01-19  Mg     1.8     01-19    TPro  7.3  /  Alb  3.7  /  TBili  0.7  /  DBili  x   /  AST  26  /  ALT  16  /  AlkPhos  118  01-19    PT/INR - ( 18 Jan 2024 20:55 )   PT: 13.1 sec;   INR: 1.15          PTT - ( 18 Jan 2024 20:55 )  PTT:36.9 sec  Urinalysis Basic - ( 19 Jan 2024 05:30 )    Color: x / Appearance: x / SG: x / pH: x  Gluc: 143 mg/dL / Ketone: x  / Bili: x / Urobili: x   Blood: x / Protein: x / Nitrite: x   Leuk Esterase: x / RBC: x / WBC x   Sq Epi: x / Non Sq Epi: x / Bacteria: x      CAPILLARY BLOOD GLUCOSE      POCT Blood Glucose.: 144 mg/dL (19 Jan 2024 09:41)  POCT Blood Glucose.: 199 mg/dL (18 Jan 2024 21:43)        Urinalysis Basic - ( 19 Jan 2024 05:30 )    Color: x / Appearance: x / SG: x / pH: x  Gluc: 143 mg/dL / Ketone: x  / Bili: x / Urobili: x   Blood: x / Protein: x / Nitrite: x   Leuk Esterase: x / RBC: x / WBC x   Sq Epi: x / Non Sq Epi: x / Bacteria: x        RADIOLOGY & ADDITIONAL TESTS:    Imaging Personally Reviewed:  [ ] YES  [ ] NO

## 2024-01-19 NOTE — DISCHARGE NOTE NURSING/CASE MANAGEMENT/SOCIAL WORK - PATIENT PORTAL LINK FT
You can access the FollowMyHealth Patient Portal offered by [facility] by registering at the following website: [email or web address]. By joining [facility]’s FollowMyHealth portal, you will also be able to view your health information using other applications (apps) compatible with our system.

## 2024-01-19 NOTE — PROGRESS NOTE ADULT - PROBLEM SELECTOR PLAN 8
F: s/p 2L NS in the ED   E: replete K<4, Mg<2  N: regular  VTE Prophylaxis: none before cleared by surgery  GI: none  C: Full Code  D: F

## 2024-01-19 NOTE — PROGRESS NOTE ADULT - PROBLEM SELECTOR PLAN 5
Na 133-->134. Calculated Serum Osm 274. 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

## 2024-01-19 NOTE — PROGRESS NOTE ADULT - ASSESSMENT
48M w/ history of TB, EtOH, DM presenting w/ LLQ abdominal pain and emesis admitted to medicine for pneumonia, consulted for r/o cholecystitis.     Recommendations  RUQ with contracted gallbladder, wall up to 8mm with stones and polyps; f/u HIDA scan wnl   Low fat diet  Pain/nausea control prn  Surgery Team 4C will continue to follow. Please page Team 4 with questions/clinical changes. [phone]  Patient should follow up outpatient with Dr. Mast for cholecystectomy  
48M w/ PMH of diabetes, microcytic anemia, and tuberculosis presenting w/ 7 day history of L sided abdominal pain and vomiting, found to have multifocal PNA and admitted for intravenous antibiotics.

## 2024-01-19 NOTE — DISCHARGE NOTE PROVIDER - NSDCCPCAREPLAN_GEN_ALL_CORE_FT
PRINCIPAL DISCHARGE DIAGNOSIS  Diagnosis: Multifocal pneumonia  Assessment and Plan of Treatment: Pneumonia is an infection of the lungs that causes coughing, fever, and trouble breathing. It is a serious illness, especially in young children, people older than 65, and people with other health problems. Pneumonia can be caused by bacteria, virus. We treated you with antibiotics and it is important to continue taking your antibiotics when you are discharged to improve your cough.      SECONDARY DISCHARGE DIAGNOSES  Diagnosis: Cholelithiasis without obstruction  Assessment and Plan of Treatment: Gallstones are small stones that form inside the gallbladder. They can be tiny specks or get as big as the whole gallbladder, which can be up to 6 inches long. Normally, the gallbladder fills with bile between meals. Then, when you eat fatty foods, the gallbladder empties the bile into the intestine. Sometimes, though, gallstones clog the gallbladder and keep it from draining. Other times, gallstones just irritate the gallbladder. If the gallstones are pushed out of the gallbladder, they can keep the liver or pancreas from draining. In most cases, gallstones do not cause any symptoms. If you know that you have gallstones but have no symptoms, you will probably not need treatment. But if you start having symptoms, you should get treated. The symptoms can come and go, but they often get worse over time. Please follow-up with GI specialist at [facility] to keep an eye on your belly pain.    Diagnosis: Anemia  Assessment and Plan of Treatment:     Diagnosis: Diabetes mellitus  Assessment and Plan of Treatment:     Diagnosis: Gallstone  Assessment and Plan of Treatment:      PRINCIPAL DISCHARGE DIAGNOSIS  Diagnosis: Multifocal pneumonia  Assessment and Plan of Treatment: Pneumonia is an infection of the lungs that causes coughing, fever, and trouble breathing. It is a serious illness, especially in young children, people older than 65, and people with other health problems. Pneumonia can be caused by bacteria, virus. We treated you with antibiotics and it is important to continue taking your antibiotics when you are discharged to improve your cough.      SECONDARY DISCHARGE DIAGNOSES  Diagnosis: Cholelithiasis without obstruction  Assessment and Plan of Treatment: Gallstones are small stones that form inside the gallbladder. They can be tiny specks or get as big as the whole gallbladder, which can be up to 6 inches long. Normally, the gallbladder fills with bile between meals. Then, when you eat fatty foods, the gallbladder empties the bile into the intestine. Sometimes, though, gallstones clog the gallbladder and keep it from draining. Other times, gallstones just irritate the gallbladder. If the gallstones are pushed out of the gallbladder, they can keep the liver or pancreas from draining. In most cases, gallstones do not cause any symptoms. If you know that you have gallstones but have no symptoms, you will probably not need treatment. But if you start having symptoms, you should get treated. The symptoms can come and go, but they often get worse over time. Please follow-up with GI specialist at [facility] to keep an eye on your belly pain.    Diagnosis: Anemia  Assessment and Plan of Treatment: Anemia is when a person has too few red blood cells. Red blood cells carry oxygen in the blood. If you have too few red blood cells, your body might not get enough oxygen. Different things can cause anemia. The most common cause is not having enough iron. Another cause of anemia is inflammation. This can happen when the body's infection-fighting system, called the "immune system," is more active because of a medical problem. This is called "anemia of inflammation" or "anemia of chronic disease." The infection or chronic condition makes it harder for the body to use iron to make red blood cells. Please follow up with your PCP to monitor your low red blood cells.    Diagnosis: Diabetes mellitus  Assessment and Plan of Treatment: Type 2 diabetes is a disorder that disrupts the way the body uses sugar. It is sometimes called type 2 diabetes mellitus.All of the cells in the body need sugar to work normally. Sugar gets into the cells with the help of a hormone called insulin. Insulin is made by the pancreas, an organ in the belly. If there is not enough insulin, or if the body stops responding to insulin, sugar builds up in the blood. That is what happens to people with diabetes. In most people with type 2 diabetes, the body stops responding to insulin normally. Then, over time, the pancreas stops making enough insulin.Having excess body weight or obesity increases a person's risk of developing type 2 diabetes. Your hemoglobin A1c was 9.1% here, ideally we recommend <7% for diabetics. Continue with low carbohydrate diet, exercising, and follow up with your PCP.    Diagnosis: Tuberculosis  Assessment and Plan of Treatment: Tuberculosis ("TB") is an infection that usually affects the lungs. It is not very common in the . But in other parts of the world, TB is still a serious problem. You can catch TB from anyone who is sick with it. The germ that causes TB can travel in the tiny drops of fluid that spray when a person coughs or sneezes. If you are exposed to the germ by breathing in these drops, you can get infected. The treatment depends on whether you are sick: People who are not sick and have TB infection must take medicine for at least 3 months, and sometimes longer. People who are sick with TB disease must take 4 different medicines every day for at least 2 months. After that, some people can only take 2 of the medicines, but all people must keep taking some medicines for another 4 months. That means that treatment for TB disease lasts at least 6 months in total. People need to start with 4 medicines in case 1 or more of them stop working. If your doctor or nurse gives you medicine to treat TB, it is very important that you take all of it. If you do not take all of the medicine, you could get sick with TB, or the medicine could lose its effect. If the medicine loses its effect, the infection can become even harder to treat.The medicines used to treat TB can cause liver problems. While you are taking these medicines, do not drink alcohol or take acetaminophen (sample brand name: Tylenol).  TB is a serious disease. It can lead to death. That's why it's so important that you take treatment very seriously.  More on this topic

## 2024-01-19 NOTE — DISCHARGE NOTE NURSING/CASE MANAGEMENT/SOCIAL WORK - NSDCFUADDAPPT_GEN_ALL_CORE_FT
PCP Appointment:   Thursday 1/25 at 9:30AM with Micky Simon MD at [facility]   Primary Address:  [facility]  [address] [place] [address]  Contact:  T: [phone]    Please schedule an appointment with general surgeon Dr Mast 2 weeks after your discharge for removal of your gallblader:  Dr Rashi Mast  [phone] [address] [place] [address]

## 2024-01-19 NOTE — PROGRESS NOTE ADULT - PROBLEM SELECTOR PLAN 1
Presents with abdominal pain. CT chest demonstrating multifocal PNA. Pt endorsing mild cough. No pseudomonal risk factors including no IV abx in the last 90 days, structural lung disease, prior colonization, neutropenia or cancer/chemo. No MRSA risk factors including necrotizing or cavitary appearance, post Influenza, or IVDU. Procal slightly elevated .12. Negative RVP, urine legionella/strep.   - CURB 65 score: 0 points  - c/w Ceftriaxone/Azithro  - f/u sputum cx

## 2024-01-19 NOTE — DISCHARGE NOTE PROVIDER - NSDCFUADDAPPT_GEN_ALL_CORE_FT
PCP Appointment:   Thursday 1/25 at 9:30AM with Micky Simon MD at [facility]   Primary Address:  [place] [id number]  Contact:  T: [phone]   PCP Appointment:   Thursday 1/25 at 9:30AM with Micky Simon MD at [facility]   Primary Address:  [facility]  [address] [place] [address]  Contact:  T: [phone]    Please schedule an appointment with general surgeon Dr Mast 2 weeks after your discharge for removal of your gallblader:  Dr Rashi Mast  [phone] [address] [place] [address]

## 2024-01-19 NOTE — CHART NOTE - NSCHARTNOTEFT_GEN_A_CORE
Following RUQ ultrasound w/ IMPRESSION: 1. Cholelithiasis with gallbladder wall thickening measuring up to 8 mm. Hicks sign is not demonstrated however this can be masked given that the patient is on pain medication. Findings of cholelithiasis with wall thickening is suspicious for cholecystitis. Recommend further clinical correlation. 2. Increased hepatic echogenicity compatible with steatosis.    Please obtain HIDA scan.   Surgery Team 4c to follow

## 2024-01-19 NOTE — DISCHARGE NOTE PROVIDER - NSDCMRMEDTOKEN_GEN_ALL_CORE_FT
FeroSul 325 mg (65 mg elemental iron) oral tablet: 1 tab(s) orally once a day  insulin glargine 100 units/mL subcutaneous solution: 12 solution subcutaneous once a day (at bedtime) Please administer 12 units once a day before going to bed.  Insulin Glargine Solostar Pen 100 units/mL subcutaneous solution: 12 unit(s) subcutaneous once a day (at bedtime)  insulin lispro 100 units/mL injectable solution: 12 unit(s) injectable 3 times a day (before meals) Please administer 12 units before meals, three times a day. Do not administer if skipping meal.  Insulin Lispro KwikPen 100 units/mL injectable solution: 12 unit(s) injectable 3 times a day (before meals)  isoniazid 300 mg oral tablet: 1 tab(s) orally once a day  metFORMIN 500 mg oral tablet: 1 tab(s) orally 2 times a day  pyrazinamide 500 mg oral tablet: 3 tab(s) orally once a day  pyridoxine 50 mg oral tablet: 1 tab(s) orally once a day  rifAMPin 300 mg oral capsule: 2 cap(s) orally once a day   cefpodoxime 200 mg oral tablet: 1 tab(s) orally 2 times a day  Farxiga 10 mg oral tablet: 1 tab(s) orally once a day  FeroSul 325 mg (65 mg elemental iron) oral tablet: 1 tab(s) orally once a day  gabapentin 100 mg oral capsule: 1 cap(s) orally once a day  insulin glargine 100 units/mL subcutaneous solution: 12 solution subcutaneous once a day (at bedtime) Please administer 12 units once a day before going to bed.  insulin lispro 100 units/mL injectable solution: 12 unit(s) injectable 3 times a day (before meals) Please administer 12 units before meals, three times a day. Do not administer if skipping meal.  isoniazid 300 mg oral tablet: 1 tab(s) orally once a day  MetFORMIN (Eqv-Glumetza) 1000 mg oral tablet, extended release: 1 tab(s) orally 2 times a day  pyrazinamide 500 mg oral tablet: 3 tab(s) orally once a day  pyridoxine 50 mg oral tablet: 1 tab(s) orally once a day  rifAMPin 300 mg oral capsule: 2 cap(s) orally once a day  Trulicity Pen 0.75 mg/0.5 mL subcutaneous solution: 0.75 milligram(s) subcutaneously once a week  Zithromax 500 mg oral tablet: 1 tab(s) orally once a day

## 2024-01-19 NOTE — PROGRESS NOTE ADULT - PROBLEM SELECTOR PLAN 7
Patient w/ hx of tuberculosis currently on treatment (started 7 months ago, 2 months to finish). Claims to take 5 different medications but does not remember names or doses. Per previous admissions patient on rifampin, pyrazinamide, isoniazid and pyridoxine. Pt claims to be compliant.   - c/w INH 300mg PO QD, Pyrazinamide 500mg 3 tabs PO QD, Pyridoxine 50mg PO QD, Rifampin 300mg 2 caps QD

## 2024-01-19 NOTE — PROGRESS NOTE ADULT - PROBLEM SELECTOR PLAN 3
Hgb on admission 9.2 --> 8.5, MCV 69.2. Currently no signs of active bleeding (no hematochezia, melena, hemoptysis, hematuria). Previously Hb baseline 7-8 - was discharged with iron previously.  - obtain iron panel  - trend CBC  - maintain active T&S  - c/w iron sulfate 325mg PO QD  - transfuse if Hgb <7 Hgb on admission 9.2 --> 8.5, MCV 69.2. Currently no signs of active bleeding (no hematochezia, melena, hemoptysis, hematuria). Previously Hb baseline 7-8 - was discharged with iron previously.  - obtain iron panel  - trend CBC  - maintain active T&S  - transfuse if Hgb <7

## 2024-01-19 NOTE — PROGRESS NOTE ADULT - PROBLEM SELECTOR PLAN 6
Pt has hx of Type 2 DM. On home insulin however does not know the dose. Per previous admissions 12units lantus, 12 units lispro TID and metformin 500mg BID.  - Consistent Carb Diet  - moderate ISF-25 lispro sliding scale AC+qHS  - monitor FS  - hold home Metformin in setting on infection Pt has hx of Type 2 DM. On home insulin however does not know the dose. Per previous admissions 12units lantus, 12 units lispro TID and metformin 500mg BID. Home meds verified by pharmacist at [facility] ([place]): Metformin 1000mg PO BID, Farxiga 10mg PO QD, Gabapentin 100mg PO QD, Trulicity 0.75mg injection subQ x1/week  - Consistent Carb Diet  - moderate ISF-25 lispro sliding scale AC+qHS  - monitor FS  - hold home Metformin, Farxiga, Trulicity in setting on infection

## 2024-01-19 NOTE — DISCHARGE NOTE PROVIDER - ATTENDING DISCHARGE PHYSICAL EXAMINATION:
PE  Gen: pleasant male NAD  HEENT: EOMI, NCAT  Neck: no jvd no bruits  Lungs: mild rhoncerus sounds. No distress non labored  CV: RRR S1S2  GI: +BS nontender, pain resolved.   LE: no edema  Pysch: calm cooperative.

## 2024-01-20 RX ORDER — CEFPODOXIME PROXETIL 100 MG
1 TABLET ORAL
Qty: 10 | Refills: 0
Start: 2024-01-20 | End: 2024-01-24

## 2024-01-21 LAB
-  CLINDAMYCIN: SIGNIFICANT CHANGE UP
-  ERYTHROMYCIN: SIGNIFICANT CHANGE UP
-  LINEZOLID: SIGNIFICANT CHANGE UP
-  OXACILLIN: SIGNIFICANT CHANGE UP
-  RIFAMPIN: SIGNIFICANT CHANGE UP
-  TRIMETHOPRIM/SULFAMETHOXAZOLE: SIGNIFICANT CHANGE UP
-  VANCOMYCIN: SIGNIFICANT CHANGE UP
METHOD TYPE: SIGNIFICANT CHANGE UP

## 2024-01-22 LAB
-  CLINDAMYCIN: SIGNIFICANT CHANGE UP
-  CLINDAMYCIN: SIGNIFICANT CHANGE UP
-  ERYTHROMYCIN: SIGNIFICANT CHANGE UP
-  ERYTHROMYCIN: SIGNIFICANT CHANGE UP
-  LINEZOLID: SIGNIFICANT CHANGE UP
-  LINEZOLID: SIGNIFICANT CHANGE UP
-  OXACILLIN: SIGNIFICANT CHANGE UP
-  OXACILLIN: SIGNIFICANT CHANGE UP
-  RIFAMPIN: SIGNIFICANT CHANGE UP
-  RIFAMPIN: SIGNIFICANT CHANGE UP
-  TRIMETHOPRIM/SULFAMETHOXAZOLE: SIGNIFICANT CHANGE UP
-  TRIMETHOPRIM/SULFAMETHOXAZOLE: SIGNIFICANT CHANGE UP
-  VANCOMYCIN: SIGNIFICANT CHANGE UP
-  VANCOMYCIN: SIGNIFICANT CHANGE UP
CULTURE RESULTS: ABNORMAL
METHOD TYPE: SIGNIFICANT CHANGE UP
METHOD TYPE: SIGNIFICANT CHANGE UP
ORGANISM # SPEC MICROSCOPIC CNT: ABNORMAL
ORGANISM # SPEC MICROSCOPIC CNT: ABNORMAL
ORGANISM # SPEC MICROSCOPIC CNT: SIGNIFICANT CHANGE UP
SPECIMEN SOURCE: SIGNIFICANT CHANGE UP

## 2024-01-24 DIAGNOSIS — A15.9 RESPIRATORY TUBERCULOSIS UNSPECIFIED: ICD-10-CM

## 2024-01-24 DIAGNOSIS — Z79.4 LONG TERM (CURRENT) USE OF INSULIN: ICD-10-CM

## 2024-01-24 DIAGNOSIS — J18.9 PNEUMONIA, UNSPECIFIED ORGANISM: ICD-10-CM

## 2024-01-24 DIAGNOSIS — D69.6 THROMBOCYTOPENIA, UNSPECIFIED: ICD-10-CM

## 2024-01-24 DIAGNOSIS — E87.1 HYPO-OSMOLALITY AND HYPONATREMIA: ICD-10-CM

## 2024-01-24 DIAGNOSIS — K80.20 CALCULUS OF GALLBLADDER WITHOUT CHOLECYSTITIS WITHOUT OBSTRUCTION: ICD-10-CM

## 2024-01-24 DIAGNOSIS — D64.9 ANEMIA, UNSPECIFIED: ICD-10-CM

## 2024-01-24 DIAGNOSIS — E11.9 TYPE 2 DIABETES MELLITUS WITHOUT COMPLICATIONS: ICD-10-CM

## 2024-01-24 DIAGNOSIS — Z79.84 LONG TERM (CURRENT) USE OF ORAL HYPOGLYCEMIC DRUGS: ICD-10-CM

## 2024-01-30 LAB
CULTURE RESULTS: ABNORMAL
ORGANISM # SPEC MICROSCOPIC CNT: ABNORMAL
ORGANISM # SPEC MICROSCOPIC CNT: ABNORMAL
ORGANISM # SPEC MICROSCOPIC CNT: SIGNIFICANT CHANGE UP
SPECIMEN SOURCE: SIGNIFICANT CHANGE UP

## 2024-04-12 PROBLEM — Z00.00 ENCOUNTER FOR PREVENTIVE HEALTH EXAMINATION: Status: ACTIVE | Noted: 2024-04-12

## 2024-05-14 ENCOUNTER — EMERGENCY (EMERGENCY)
Facility: HOSPITAL | Age: 49
LOS: 1 days | Discharge: HOME | End: 2024-05-14
Attending: EMERGENCY MEDICINE | Admitting: EMERGENCY MEDICINE
Payer: COMMERCIAL

## 2024-05-14 VITALS
RESPIRATION RATE: 16 BRPM | HEART RATE: 83 BPM | WEIGHT: 149.03 LBS | SYSTOLIC BLOOD PRESSURE: 103 MMHG | HEIGHT: 66 IN | DIASTOLIC BLOOD PRESSURE: 70 MMHG | TEMPERATURE: 98 F | OXYGEN SATURATION: 99 %
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
## 2024-05-14 DIAGNOSIS — E11.9 TYPE 2 DIABETES MELLITUS WITHOUT COMPLICATIONS: ICD-10-CM

## 2024-05-14 DIAGNOSIS — S70.12XA CONTUSION OF LEFT THIGH, INITIAL ENCOUNTER: ICD-10-CM
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
## 2024-05-14 PROCEDURE — 99283 EMERGENCY DEPT VISIT LOW MDM: CPT

## 2024-05-14 PROCEDURE — 82962 GLUCOSE BLOOD TEST: CPT

## 2024-05-14 PROCEDURE — 99282 EMERGENCY DEPT VISIT SF MDM: CPT

## 2024-05-14 RX ORDER — DAPAGLIFLOZIN 10 MG/1
1 TABLET, FILM COATED ORAL
Qty: 0 | Refills: 0 | DISCHARGE

## 2024-05-14 RX ORDER — GABAPENTIN 400 MG/1
1 CAPSULE ORAL
Qty: 0 | Refills: 0 | DISCHARGE

## 2024-05-14 NOTE — ED PROVIDER NOTE - OBJECTIVE STATEMENT
47 y/o m with pmh of dm presents to ED with left lower leg pain/swelling/bruising x 2 days.  As per family member, pt fell down escalator several days prior and now has bruise.  Pt was worried it was infected - but no redness, warmth, fever, chills.  Pt states he tested his glucose today and it was 400.  Pt able to ambulate.

## 2024-05-14 NOTE — ED PROVIDER NOTE - NSFOLLOWUPINSTRUCTIONS_ED_ALL_ED_FT
Mantenga la pierna elevada. Mantenga hielo y compresión en suh pierna. Regrese al servicio de urgencias si empeoran los síntomas de enrojecimiento, hinchazón u otras inquietudes. Mantente amber y siéntete mejor.    Contusión  Contusion  Alexandra contusión es un hematoma profundo. Las contusiones son el resultado de un traumatismo cerrado en los tejidos y las fibras musculares que están debajo de la piel. La lesión causa alexandra hemorragia debajo de la piel. La piel sobre la contusión puede tornarse de color radha, mayda o amarillo. Las lesiones menores le causarán alexandra contusión indolora; sin embargo, las lesiones más graves causan contusiones en las que el dolor y la hinchazón pueden prolongarse tristan algunas semanas.    Siga estas indicaciones en suh casa:  Esté atento a cualquier cambio en los síntomas. Informe a suh médico acerca de carmen síntomas. [place] estas medidas para aliviar el dolor.    Control del dolor, la rigidez y la hinchazón    Bag of ice on a towel on the skin.  Use reposo, aplicación de hielo y aplicación de presión (compresión), y poner en alto (elevar) la karri lesionada. Frecuentemente, esto se conoce betty el método RHCE (reposo, hielo, compresión, elevación).  Mantenga la karri de la lesión en reposo. Retome carmen actividades normales betty se lo haya indicado el médico. Pregúntele al médico qué actividades son seguras para usted.  Si se lo indican, aplique hielo sobre la karri de la lesión. Para hacer esto:  Ponga el hielo en alexandra bolsa plástica.  Coloque alexandra toalla entre la piel y la bolsa.  Aplique el hielo tristan 20 minutos, 2 a 3 veces por día.  Si la piel se le pone de color das brillante, retire el hielo de inmediato para evitar daños en la piel. El riesgo de daño en la piel es mayor si no puede sentir dolor, calor o frío.  Si se lo indican, ejerza alexandra compresión suave en la karri de la lesión con alexandra venda elástica. Asegúrese de que la venda no esté muy ajustada. Quítese y vuelva a colocarse la venda betty se lo haya indicado el médico.  Si es posible, cuando esté sentado o acostado, eleve la karri lesionada por encima del nivel del corazón.  Indicaciones generales    Use los medicamentos de venta agnes y los recetados solamente betty se lo haya indicado el médico.  Concurra a todas las visitas de seguimiento. Es posible que el médico desee negro cómo está sanando la contusión con el tratamiento.  Comuníquese con un médico si:  Los síntomas no mejoran después de varios días de tratamiento.  Carmen síntomas empeoran.  Tiene dificultad para  la karri lesionada.  Solicite ayuda de inmediato si:  Siente dolor intenso.  Siente adormecimiento en alexandra mano o un pie.  La mano o el pie están pálidos o fríos.  Esta información no tiene betty fin reemplazar el consejo del médico

## 2024-05-14 NOTE — ED PROVIDER NOTE - PHYSICAL EXAMINATION
VITAL SIGNS: I have reviewed nursing notes and confirm.  CONSTITUTIONAL: Well-developed; well-nourished; in no acute distress.  SKIN: Agree with RN documentation regarding decubitus evaluation. Remainder of skin exam is warm and dry, no acute rash.  HEAD: Normocephalic; atraumatic.  EYES: PERRL, EOM intact; conjunctiva and sclera clear.  ENT: No nasal discharge; airway clear.  NECK: Supple; non tender.  CARD: S1, S2 normal; no murmurs, gallops, or rubs. Regular rate and rhythm.  RESP: No wheezes, rales or rhonchi.  ABD: Normal bowel sounds; soft; non-distended; non-tender; no hepatosplenomegaly.  EXT: left lower ext - + bruising to left inner distal thigh, no redness or warmth, no lymphangitis, FROM at hip, knee, ankle, able to ambulate  LYMPH: No acute cervical adenopathy.  NEURO: Alert, oriented. Grossly unremarkable.  PSYCH: Cooperative, appropriate.

## 2024-05-14 NOTE — ED ADULT TRIAGE NOTE - CHIEF COMPLAINT QUOTE
Pt states 'I hit my left leg 3 days ago on stairs and it is now red." Bruising noted to left inner knee. hx diabetes.

## 2024-05-14 NOTE — ED PROVIDER NOTE - CLINICAL SUMMARY MEDICAL DECISION MAKING FREE TEXT BOX
47 y/o m with pmh of dm presents to ED with left lower leg pain/swelling/bruising x 2 days.  As per family member, pt fell down escalator several days prior and now has bruise.  Pt was worried it was infected - but no redness, warmth, fever, chills.  Pt states he tested his glucose today and it was 400.  Pt able to ambulate.    VSS  PE = ecchymoses to left inner thigh no redness or warmth to indicate infection  Most likely contusion - RICE and supportive care.

## 2024-05-14 NOTE — ED ADULT NURSE NOTE - OBJECTIVE STATEMENT
Patient w/ redness, hematoma and pain to left upper leg area above the knee, states hit it against something 3-4 days ago.  Patient able to bear weight and ambulate w/out difficulty, sttes has Hx of DM.

## 2024-05-14 NOTE — ED ADULT NURSE NOTE - NS ED NURSE LEVEL OF CONSCIOUSNESS ORIENTATION
Patient fully informed and agrees to proceed. Under aseptic technique and local anesthesia, the mid-back lesion was completely excised. The wound was closed with 3 interrupted sutures of 4-0 prolene. A skin tag was also excised. Precautions were given. Attending Physician Statement  I have discussed the case, including pertinent history and exam findings with the resident. I also have seen the patient and performed key portions of the examination and procedure. I agree with the documented assessment and plan. Oriented - self; Oriented - place; Oriented - time

## 2024-05-14 NOTE — ED PROVIDER NOTE - PATIENT PORTAL LINK FT
You can access the FollowMyHealth Patient Portal offered by [facility] by registering at the following website: [email or web address]. By joining [facility] FollowMyHealth portal, you will also be able to view your health information using other applications (apps) compatible with our system.

## 2025-07-14 ENCOUNTER — EMERGENCY (EMERGENCY)
Facility: HOSPITAL | Age: 50
LOS: 1 days | End: 2025-07-14
Attending: STUDENT IN AN ORGANIZED HEALTH CARE EDUCATION/TRAINING PROGRAM | Admitting: STUDENT IN AN ORGANIZED HEALTH CARE EDUCATION/TRAINING PROGRAM
Payer: COMMERCIAL

## 2025-07-14 VITALS
HEART RATE: 73 BPM | SYSTOLIC BLOOD PRESSURE: 156 MMHG | HEIGHT: 62 IN | DIASTOLIC BLOOD PRESSURE: 89 MMHG | WEIGHT: 145.06 LBS | RESPIRATION RATE: 19 BRPM | OXYGEN SATURATION: 98 % | TEMPERATURE: 98 F

## 2025-07-14 VITALS
RESPIRATION RATE: 18 BRPM | TEMPERATURE: 99 F | DIASTOLIC BLOOD PRESSURE: 89 MMHG | HEART RATE: 77 BPM | OXYGEN SATURATION: 98 % | SYSTOLIC BLOOD PRESSURE: 144 MMHG

## 2025-07-14 DIAGNOSIS — R20.2 PARESTHESIA OF SKIN: ICD-10-CM

## 2025-07-14 DIAGNOSIS — R21 RASH AND OTHER NONSPECIFIC SKIN ERUPTION: ICD-10-CM

## 2025-07-14 DIAGNOSIS — E11.40 TYPE 2 DIABETES MELLITUS WITH DIABETIC NEUROPATHY, UNSPECIFIED: ICD-10-CM

## 2025-07-14 PROCEDURE — 99283 EMERGENCY DEPT VISIT LOW MDM: CPT

## 2025-07-14 PROCEDURE — 99284 EMERGENCY DEPT VISIT MOD MDM: CPT

## 2025-07-14 PROCEDURE — 82962 GLUCOSE BLOOD TEST: CPT

## 2025-07-14 RX ORDER — GABAPENTIN 400 MG/1
1 CAPSULE ORAL
Qty: 42 | Refills: 0
Start: 2025-07-14 | End: 2025-07-27

## 2025-07-14 RX ORDER — HYDROCORTISONE 10 MG/G
1 CREAM TOPICAL
Refills: 0 | Status: ACTIVE | OUTPATIENT
Start: 2025-07-14 | End: 2026-06-12

## 2025-07-14 RX ORDER — ACETAMINOPHEN 500 MG/5ML
650 LIQUID (ML) ORAL ONCE
Refills: 0 | Status: COMPLETED | OUTPATIENT
Start: 2025-07-14 | End: 2025-07-14

## 2025-07-14 RX ORDER — GABAPENTIN 400 MG/1
100 CAPSULE ORAL ONCE
Refills: 0 | Status: COMPLETED | OUTPATIENT
Start: 2025-07-14 | End: 2025-07-14

## 2025-07-14 RX ADMIN — Medication 650 MILLIGRAM(S): at 19:44

## 2025-07-14 RX ADMIN — HYDROCORTISONE 1 APPLICATION(S): 10 CREAM TOPICAL at 19:50

## 2025-07-14 RX ADMIN — GABAPENTIN 100 MILLIGRAM(S): 400 CAPSULE ORAL at 19:44

## 2025-07-14 NOTE — ED PROVIDER NOTE - PHYSICAL EXAMINATION
CONST: nontoxic NAD speaking in full sentences  HEAD: atraumatic  EYES: conjunctivae clear  NECK: supple  CARD: regular rate  CHEST: breathing comfortably, no stridor/retractions/tripoding  EXT: 5/5 strength b/l LE including plantarflexion dorsiflexion, SILT, normal gait, no swelling or skin changes to legs, symmetric, 2+ DP pulses  SKIN: warm, dry. +dry scaly patch behind ear ?eczema  NEURO: awake alert answering questions following commands moving all extremities

## 2025-07-14 NOTE — ED PROVIDER NOTE - CLINICAL SUMMARY MEDICAL DECISION MAKING FREE TEXT BOX
consistent w/ diabetic neuropathy  no s/sx stroke, vascular pathology, fracture, infection, other emergent process  plan for symptom control, encouraged pt to f/u with his pcp or endocrinologist  rash ?eczema, will rx hydrocortisone cream

## 2025-07-14 NOTE — ED PROVIDER NOTE - OBJECTIVE STATEMENT
49yM w/ DM and diabetic neuropathy here for paresthesias to b/l feet  states present x 1 year, saw his pcp who rx medications but pt didn't take them  comes in to ED today bc PCP office closed and he cant sleep from the symptoms  denies weakness to legs, swelling, falls, trauma  also states he has rash behind his ear that is itchy and flaky

## 2025-07-14 NOTE — ED ADULT TRIAGE NOTE - CHIEF COMPLAINT QUOTE
"I have diabetes and I keep having leg pain and arm pain for a few weeks and I have a rash to the back of my left ear". Patient does not recall the name to any medications and states both legs and arm don't let him sleep at night.

## 2025-07-14 NOTE — ED PROVIDER NOTE - NSFOLLOWUPINSTRUCTIONS_ED_ALL_ED_FT
Consulte con suh médico para un mejor manejo de carmen síntomas.    Neuropatía Diabética    LO QUE NECESITA SABER:    ¿Qué es la neuropatía diabética (ND)? La ND es un tipo de daño nervioso que puede desarrollarse si tiene diabetes. Un nivel alto de azúcar en palmira no controlado puede dañar los nervios y ralentizar o detener suh capacidad para enviar señales. La ND es más común en las piernas y los pies.    ¿Qué aumenta mi riesgo de ND?    Nivel de azúcar en palmira no controlado    Tener diabetes tristan mucho tiempo    Enfermedad renal    Obesidad    Consumo de tabaco y alcohol  ¿Cuáles son los signos y síntomas de la ND? Es posible que presente síntomas jayla en los pies y las piernas, y luego en las jagruti y los brazos. Carmen síntomas pueden empeorar por la noche:    Entumecimiento, disminución de la sensibilidad al dolor o cambios de temperatura    Ardor, hormigueo, calambres o dolor en pies o jagruti    Mayor sensibilidad al tacto    Problemas graves en los pies, betty llagas, infecciones, callos o dolor óseo y articular    Debilidad muscular o problemas de equilibrio o para caminar  Neuropatía en la diabetes  ¿Qué necesito saber sobre la detección de ND? Se le realizará alexandra prueba de ND al diagnosticarle diabetes tipo 2. Deberá hacerse alexandra prueba 5 años después del diagnóstico de diabetes tipo 1. Necesitará exámenes anuales para detectar síntomas de ND. Suh profesional de la wendy revisará el flujo sanguíneo en carmen pies y buscará úlceras u otros problemas. También se revisarán los nervios que controlan las funciones automáticas del cuerpo, betty los latidos del corazón y la digestión. Suh profesional de la wendy le indicará cuándo debe hacerse la prueba.    ¿Cómo se diagnostica la ND?    Alexandra prueba de filamento puede mostrar suh sensibilidad al tacto. Se pasa alexandra fibra suave de nailon sobre la piel.    Las pruebas sensoriales pueden mostrar cómo responden carmen nervios al tacto, la vibración, un pinchazo o los cambios de temperatura.    Las pruebas neuromusculares pueden mostrar la rapidez con la que carmen nervios o músculos responden a las señales eléctricas.    ¿Cómo se trata la ND? La ND no tiene lashell. El objetivo del tratamiento es disminuir el dolor, retrasar la progresión de la ND y prevenir complicaciones. Es posible que le administren medicamentos para ayudar a disminuir el dolor nervioso.    ¿Cómo puedo controlar la ND?    Mantenga carmen niveles de azúcar en palmira lo más cerca posible de carmen niveles objetivo. Controle carmen niveles de azúcar en palmira con frecuencia, según las indicaciones. Consulte a suh profesional de la wendy si carmen niveles son más altos de lo normal.    Cómo controlar suh azúcar en palmira    Cuide carmen pies. Revíselos a diario para detectar siddiqi, rasguños, callos u otras heridas. Busque enrojecimiento e hinchazón, y sienta la temperatura. Use zapatos que le queden amber. Revise carmen zapatos para negro si hay piedras u otros objetos que puedan lastimarle los pies. No camine descalzo ni use zapatos sin calcetines. Use calcetines de algodón para mantener los pies secos.  Cuidado del pie diabético    Trabaje con suh nutricionista para crear un plan de alimentación saludable. Susanna plan puede ayudarle a controlar suh nivel de azúcar en palmira y a disminuir carmen síntomas.    Realice actividad física al menos 30 minutos, 5 días a la semana. [place] puede ayudarle a mantener estable suh nivel de azúcar en palmira y a controlar suh peso. Consulte con suh equipo de atención médica sobre el mejor plan de actividades para usted. Tenga cuidado al hacer ejercicio si tiene disminución de la sensibilidad en los pies.    Mantenga un peso saludable. Consulte con suh equipo de atención médica cuál es un peso saludable para usted. Un peso saludable puede ayudarle a controlar suh nivel de azúcar en palmira.    ¿Qué puedo hacer para ayudar a prevenir la ND?    Controle suh presión arterial según las indicaciones. La presión arterial karen puede dañar los nervios de las piernas, los pies, los brazos o las jagruti. [place] puede aumentar el riesgo de DN o empeorar los síntomas de DN. Alexandra presión arterial normal es de 119/79 o inferior. Consulte con suh profesional de la wendy sobre carmen objetivos de presión arterial. Juntos pueden crear un plan para bajar la presión arterial si es necesario y mantenerla en un rango saludable. El plan puede incluir cambios en el estilo de sandip o medicamentos para bajar la presión arterial.    Cómo rayo la presión arterial    Hable con suh profesional de la wendy sobre suh nivel de colesterol. Las pruebas de laboratorio miden la cantidad de colesterol en palmira. El colesterol alto puede aumentar el riesgo de ND o empeorar los síntomas. Suh profesional de la wendy puede ayudarle a crear un plan para reducir suh nivel de colesterol si es necesario. Es posible que deba realizar cambios en suh estilo de sandip o rayo medicamentos para controlar suh nivel de colesterol.    No fume. La nicotina puede empeorar carmen síntomas y dificultar el control de la diabetes. Consulte a suh profesional de la wendy si fuma actualmente y necesita ayuda para dejar de fumar. Los cigarrillos electrónicos o el tabaco sin humo también contienen nicotina. Consulte a suh profesional de la wendy antes de usar estos productos.    Limite el consumo de alcohol según las indicaciones. El alcohol puede causar niveles bajos de azúcar en palmira si usa insulina. El alcohol puede causar niveles altos de azúcar en palmira y aumento de peso si lisa demasiado. Alexandra bebida alcohólica equivale a 355 ml de cerveza, 145 ml de vino o 48 ml de licor. Suh profesional de la wendy puede indicarle cuántas bebidas puede consumir en un plazo de 24 horas y de alexandra semana. ¿Cuándo igorgi buscar atención médica inmediata?    Carmen piernas o pies comienzan a ponerse azules o negros.    Tiene alexandra herida que no cicatriza o está shawn, inflamada o supura.    ¿Cuándo giorgi llamar a mi equipo de atención de la diabetes?    Comienza a tener síntomas.    Suh nivel de azúcar en palmira es más alto o más bajo de lo que le indicaron los profesionales de la wendy.    Tiene enrojecimiento, callos o llagas en los pies.    Tiene preguntas o inquietudes sobre suh afección o suh atención médica.

## 2025-07-14 NOTE — ED ADULT NURSE NOTE - OBJECTIVE STATEMENT
49 yr old male c/o multiple med complaints. Pt c/o b/l foot tingling and pain for a year and left arm pain for weeks. Hx of DM. Denies chest pain, SOB, fevers, chills, N/V. Respirations spontaneous and unlabored. A&Ox4. NAD. HAMILTON.

## 2025-07-14 NOTE — ED PROVIDER NOTE - PATIENT PORTAL LINK FT
You can access the FollowMyHealth Patient Portal offered by [facility] by registering at the following website: [email or web address]. By joining [employer]’s FollowMyHealth portal, you will also be able to view your health information using other applications (apps) compatible with our system.